# Patient Record
Sex: FEMALE | Race: BLACK OR AFRICAN AMERICAN | Employment: FULL TIME | URBAN - METROPOLITAN AREA
[De-identification: names, ages, dates, MRNs, and addresses within clinical notes are randomized per-mention and may not be internally consistent; named-entity substitution may affect disease eponyms.]

---

## 2017-02-01 ENCOUNTER — HOSPITAL ENCOUNTER (EMERGENCY)
Age: 27
Discharge: HOME | End: 2017-02-01
Attending: EMERGENCY MEDICINE | Admitting: EMERGENCY MEDICINE
Payer: SELF-PAY

## 2017-02-01 VITALS
RESPIRATION RATE: 16 BRPM | WEIGHT: 150 LBS | DIASTOLIC BLOOD PRESSURE: 68 MMHG | HEART RATE: 77 BPM | HEIGHT: 65 IN | BODY MASS INDEX: 24.99 KG/M2 | OXYGEN SATURATION: 99 % | TEMPERATURE: 98.3 F | SYSTOLIC BLOOD PRESSURE: 112 MMHG

## 2017-02-01 DIAGNOSIS — K03.81 CRACKED TOOTH: ICD-10-CM

## 2017-02-01 DIAGNOSIS — K04.7 DENTAL ABSCESS: Primary | ICD-10-CM

## 2017-02-01 PROCEDURE — 74011250637 HC RX REV CODE- 250/637: Performed by: EMERGENCY MEDICINE

## 2017-02-01 PROCEDURE — 99283 EMERGENCY DEPT VISIT LOW MDM: CPT

## 2017-02-01 RX ORDER — PENICILLIN V POTASSIUM 250 MG/1
500 TABLET, FILM COATED ORAL
Status: COMPLETED | OUTPATIENT
Start: 2017-02-01 | End: 2017-02-01

## 2017-02-01 RX ORDER — HYDROCODONE BITARTRATE AND ACETAMINOPHEN 5; 325 MG/1; MG/1
1 TABLET ORAL
Status: COMPLETED | OUTPATIENT
Start: 2017-02-01 | End: 2017-02-01

## 2017-02-01 RX ORDER — PENICILLIN V POTASSIUM 500 MG/1
500 TABLET, FILM COATED ORAL 4 TIMES DAILY
Qty: 28 TAB | Refills: 0 | Status: SHIPPED | OUTPATIENT
Start: 2017-02-01 | End: 2017-02-08

## 2017-02-01 RX ORDER — HYDROCODONE BITARTRATE AND ACETAMINOPHEN 5; 325 MG/1; MG/1
TABLET ORAL
Qty: 8 TAB | Refills: 0 | Status: SHIPPED | OUTPATIENT
Start: 2017-02-01 | End: 2017-05-01

## 2017-02-01 RX ADMIN — HYDROCODONE BITARTRATE AND ACETAMINOPHEN 1 TABLET: 5; 325 TABLET ORAL at 19:08

## 2017-02-01 RX ADMIN — PENICILLIN V POTASIUM 500 MG: 250 TABLET ORAL at 19:07

## 2017-02-01 NOTE — ED PROVIDER NOTES
HPI Comments: 33 y/o female presents with L lower dental pain. Started about 2-3 weeks ago, but worsened in past 3-4 days. Taking motrin without improvement. Has known cavities but does not have dental insurance. No difficulty swallowing. No fever. No other complaints. Patient is a 32 y.o. female presenting with dental problem. Dental Pain             Past Medical History:   Diagnosis Date    Miscarriage        Past Surgical History:   Procedure Laterality Date    Hx dilation and curettage  2013         No family history on file. Social History     Social History    Marital status: SINGLE     Spouse name: N/A    Number of children: N/A    Years of education: N/A     Occupational History    Not on file. Social History Main Topics    Smoking status: Never Smoker    Smokeless tobacco: Never Used    Alcohol use No    Drug use: No    Sexual activity: Yes     Partners: Male     Birth control/ protection: Condom     Other Topics Concern    Not on file     Social History Narrative    No narrative on file         ALLERGIES: Review of patient's allergies indicates no known allergies. Review of Systems   Constitutional: Negative for fever. HENT: Positive for dental problem. Negative for trouble swallowing. Gastrointestinal: Negative for vomiting. Vitals:    02/01/17 1844   BP: 112/68   Pulse: 77   Resp: 16   Temp: 98.3 °F (36.8 °C)   SpO2: 99%   Weight: 68 kg (150 lb)   Height: 5' 5\" (1.651 m)            Physical Exam   Constitutional: She is oriented to person, place, and time. She appears well-developed and well-nourished. HENT:   Head: Normocephalic and atraumatic. Mouth/Throat:       Uvula midline  No floor of mouth crepitus   Neck: Neck supple. No JVD present. Musculoskeletal: She exhibits no edema. Neurological: She is alert and oriented to person, place, and time. Skin: Skin is warm and dry. No erythema.         MDM  Number of Diagnoses or Management Options  Cracked tooth:   Dental abscess:   Diagnosis management comments: No signs of serious infection, maria ewigs. Pt tolerating secretions, afebrile. Will give dose of meds here, abx and pain meds. Given follow up with manoj moscoso.      ED Course       Procedures

## 2017-02-01 NOTE — DISCHARGE INSTRUCTIONS
Abscessed Tooth: Care Instructions  Your Care Instructions    An abscessed tooth is a tooth that has a pocket of pus in the tissues around it. Pus forms when the body tries to fight an infection caused by bacteria. If the pus cannot drain, it forms an abscess. An abscessed tooth can cause red, swollen gums and throbbing pain, especially when you chew. You may have a bad taste in your mouth and a fever, and your jaw may swell. Damage to the tooth, untreated tooth decay, or gum disease can cause an abscessed tooth. An abscessed tooth needs to be treated by a dental professional right away. If it is not treated, the infection could spread to other parts of your body. Your dentist will give you antibiotics to stop the infection. He or she may make a hole in the tooth or cut open (julia) the abscess inside your mouth so that the infection can drain, which should relieve your pain. You may need to have a root canal treatment, which tries to save your tooth by taking out the infected pulp and replacing it with a healing medicine and/or a filling. If these treatments do not work, your tooth may have to be removed. Follow-up care is a key part of your treatment and safety. Be sure to make and go to all appointments, and call your doctor if you are having problems. It's also a good idea to know your test results and keep a list of the medicines you take. How can you care for yourself at home? · Reduce pain and swelling in your face and jaw by putting ice or a cold pack on the outside of your cheek for 10 to 20 minutes at a time. Put a thin cloth between the ice and your skin. · Take pain medicines exactly as directed. ¨ If the doctor gave you a prescription medicine for pain, take it as prescribed. ¨ If you are not taking a prescription pain medicine, ask your doctor if you can take an over-the-counter medicine. · Take your antibiotics as directed. Do not stop taking them just because you feel better.  You need to take the full course of antibiotics. To prevent tooth abscess  · Brush and floss every day, and have regular dental checkups. · Eat a healthy diet, and avoid sugary foods and drinks. · Do not smoke or use spit tobacco. Tobacco use slows your ability to heal. It also increases your risk for gum disease and cancer of the mouth and throat. If you need help quitting, talk to your doctor about stop-smoking programs and medicines. These can increase your chances of quitting for good. When should you call for help? Call 911 anytime you think you may need emergency care. For example, call if:  · You have trouble breathing. Call your doctor now or seek immediate medical care if:  · You are dizzy or lightheaded, or you feel like you may faint. · You have a new or higher fever. · You have swelling, redness, or pain that spreads or gets worse. · You have pus coming from the tooth area. · You have an earache or pain behind your ear. · You have a fever with a stiff neck or a severe headache. · You are sensitive to light or feel very sleepy or confused. Watch closely for changes in your health, and be sure to contact your doctor if:  · You do not get better as expected. Where can you learn more? Go to http://marina-shana.info/. Enter B826 in the search box to learn more about \"Abscessed Tooth: Care Instructions. \"  Current as of: August 9, 2016  Content Version: 11.1  © 1112-5162 [employer] Care instructions adapted under license by [employer] (which disclaims liability or warranty for this information). If you have questions about a medical condition or this instruction, always ask your healthcare professional. [place] [address] any warranty or liability for your use of this information.

## 2017-02-02 NOTE — ED NOTES
I have reviewed discharge instructions with the patient. The patient verbalized understanding.   Patient armband removed and shredded, scripts x 2 given

## 2017-03-28 ENCOUNTER — APPOINTMENT (OUTPATIENT)
Dept: GENERAL RADIOLOGY | Age: 27
End: 2017-03-28
Attending: PHYSICIAN ASSISTANT
Payer: SELF-PAY

## 2017-03-28 ENCOUNTER — HOSPITAL ENCOUNTER (EMERGENCY)
Age: 27
Discharge: HOME | End: 2017-03-28
Attending: EMERGENCY MEDICINE
Payer: SELF-PAY

## 2017-03-28 VITALS
SYSTOLIC BLOOD PRESSURE: 103 MMHG | OXYGEN SATURATION: 98 % | WEIGHT: 155 LBS | BODY MASS INDEX: 25.83 KG/M2 | TEMPERATURE: 98.1 F | DIASTOLIC BLOOD PRESSURE: 60 MMHG | HEIGHT: 65 IN | RESPIRATION RATE: 15 BRPM | HEART RATE: 82 BPM

## 2017-03-28 DIAGNOSIS — J10.1 INFLUENZA A: Primary | ICD-10-CM

## 2017-03-28 LAB
FLUAV AG NPH QL IA: POSITIVE
FLUBV AG NOSE QL IA: NEGATIVE

## 2017-03-28 PROCEDURE — 87804 INFLUENZA ASSAY W/OPTIC: CPT | Performed by: PHYSICIAN ASSISTANT

## 2017-03-28 PROCEDURE — 99283 EMERGENCY DEPT VISIT LOW MDM: CPT

## 2017-03-28 PROCEDURE — 71020 XR CHEST PA LAT: CPT

## 2017-03-28 PROCEDURE — 74011250637 HC RX REV CODE- 250/637: Performed by: PHYSICIAN ASSISTANT

## 2017-03-28 RX ORDER — ACETAMINOPHEN 500 MG
1000 TABLET ORAL
Status: COMPLETED | OUTPATIENT
Start: 2017-03-28 | End: 2017-03-28

## 2017-03-28 RX ADMIN — ACETAMINOPHEN 1000 MG: 500 TABLET ORAL at 12:15

## 2017-03-28 NOTE — LETTER
[address] [place] [address] Dr [facility] EMERGENCY DEPT 
[address] [facility] [address] 
[phone] Work/School Note Date: 3/28/2017 To Whom It May concern: 
 
Desire Walton was seen and treated today in the emergency room by the following provider(s): 
Attending Provider: Ann Celestin DO Physician Assistant: LUÍS Sanderson. Desire Walton may be excused from work on 3/28/17. Sincerely, LUÍS Sanderson

## 2017-03-28 NOTE — ED PROVIDER NOTES
HPI Comments: 27yoF to ED c/o generalized body aches and cough since yesterday morning. Pt denies fever, chills, N/V/D, abd pain, dizziness, urinary complaints, or any other complaints. Pt states her significant other and son are both sick with same. Patient is a 32 y.o. female presenting with cough, general illness, and headaches. The history is provided by the patient. Cough   Associated symptoms include headaches. Generalized Body Aches   Associated symptoms include headaches. Headache   Associated symptoms include headaches. Past Medical History:   Diagnosis Date    Miscarriage        Past Surgical History:   Procedure Laterality Date    HX DILATION AND CURETTAGE  2013         No family history on file. Social History     Social History    Marital status: SINGLE     Spouse name: N/A    Number of children: N/A    Years of education: N/A     Occupational History    Not on file. Social History Main Topics    Smoking status: Never Smoker    Smokeless tobacco: Never Used    Alcohol use No    Drug use: No    Sexual activity: Yes     Partners: Male     Birth control/ protection: Condom     Other Topics Concern    Not on file     Social History Narrative    No narrative on file         ALLERGIES: Review of patient's allergies indicates no known allergies. Review of Systems   Respiratory: Positive for cough. Neurological: Positive for headaches. All other systems reviewed and are negative. Vitals:    03/28/17 1029   BP: 96/57   Pulse: 98   Resp: 14   Temp: 100.1 °F (37.8 °C)   SpO2: 98%   Weight: 70.3 kg (155 lb)   Height: 5' 5\" (1.651 m)            Physical Exam   Constitutional: She is oriented to person, place, and time. She appears well-developed and well-nourished. No distress. HENT:   Head: Normocephalic and atraumatic.    Right Ear: External ear normal.   Left Ear: External ear normal.   Nose: Nose normal.   Mouth/Throat: Oropharynx is clear and moist. No oropharyngeal exudate. Eyes: Conjunctivae and EOM are normal. Pupils are equal, round, and reactive to light. Neck: Normal range of motion. Neck supple. Cardiovascular: Normal rate, regular rhythm and normal heart sounds. Pulmonary/Chest: Effort normal and breath sounds normal. She has no wheezes. Abdominal: Soft. There is no tenderness. Musculoskeletal: Normal range of motion. She exhibits no edema. Neurological: She is alert and oriented to person, place, and time. Skin: Skin is warm and dry. No rash noted. She is not diaphoretic. Psychiatric: She has a normal mood and affect. MDM  Number of Diagnoses or Management Options  Influenza A:   Diagnosis management comments: Pt presents with flu-like symptoms, positive flu A, advised symptomatic treatment (fluids, tylenol, motrin, rest). Pt agree with plan.  LUÍS Briscoe 1:27 PM         Amount and/or Complexity of Data Reviewed  Clinical lab tests: ordered and reviewed    Risk of Complications, Morbidity, and/or Mortality  Presenting problems: moderate  Diagnostic procedures: low  Management options: low    Patient Progress  Patient progress: stable    ED Course       Procedures

## 2017-03-28 NOTE — DISCHARGE INSTRUCTIONS
Influenza (Flu): Care Instructions  Your Care Instructions  Influenza (flu) is an infection in the lungs and breathing passages. It is caused by the influenza virus. There are different strains, or types, of the flu virus from year to year. Unlike the common cold, the flu comes on suddenly and the symptoms, such as a cough, congestion, fever, chills, fatigue, aches, and pains, are more severe. These symptoms may last up to 10 days. Although the flu can make you feel very sick, it usually doesn't cause serious health problems. Home treatment is usually all you need for flu symptoms. But your doctor may prescribe antiviral medicine to prevent other health problems, such as pneumonia, from developing. Older people and those who have a long-term health condition, such as lung disease, are most at risk for having pneumonia or other health problems. Follow-up care is a key part of your treatment and safety. Be sure to make and go to all appointments, and call your doctor if you are having problems. Its also a good idea to know your test results and keep a list of the medicines you take. How can you care for yourself at home? · Get plenty of rest.  · Drink plenty of fluids, enough so that your urine is light yellow or clear like water. If you have kidney, heart, or liver disease and have to limit fluids, talk with your doctor before you increase the amount of fluids you drink. · Take an over-the-counter pain medicine if needed, such as acetaminophen (Tylenol), ibuprofen (Advil, Motrin), or naproxen (Aleve), to relieve fever, headache, and muscle aches. Read and follow all instructions on the label. No one younger than 20 should take aspirin. It has been linked to Reye syndrome, a serious illness. · Do not smoke. Smoking can make the flu worse. If you need help quitting, talk to your doctor about stop-smoking programs and medicines. These can increase your chances of quitting for good.   · Breathe moist air from a hot shower or from a sink filled with hot water to help clear a stuffy nose. · Before you use cough and cold medicines, check the label. These medicines may not be safe for young children or for people with certain health problems. · If the skin around your nose and lips becomes sore, put some petroleum jelly on the area. · To ease coughing:  ¨ Drink fluids to soothe a scratchy throat. ¨ Suck on cough drops or plain hard candy. ¨ Take an over-the-counter cough medicine that contains dextromethorphan to help you get some sleep. Read and follow all instructions on the label. ¨ Raise your head at night with an extra pillow. This may help you rest if coughing keeps you awake. · Take any prescribed medicine exactly as directed. Call your doctor if you think you are having a problem with your medicine. To avoid spreading the flu  · Wash your hands regularly, and keep your hands away from your face. · Stay home from school, work, and other public places until you are feeling better and your fever has been gone for at least 24 hours. The fever needs to have gone away on its own without the help of medicine. · Ask people living with you to talk to their doctors about preventing the flu. They may get antiviral medicine to keep from getting the flu from you. · To prevent the flu in the future, get a flu vaccine every fall. Encourage people living with you to get the vaccine. · Cover your mouth when you cough or sneeze. When should you call for help? Call 911 anytime you think you may need emergency care. For example, call if:  · You have severe trouble breathing. Call your doctor now or seek immediate medical care if:  · You have new or worse trouble breathing. · You seem to be getting much sicker. · You feel very sleepy or confused. · You have a new or higher fever. · You get a new rash.   Watch closely for changes in your health, and be sure to contact your doctor if:  · You begin to get better and then get worse. · You are not getting better after 1 week. Where can you learn more? Go to http://marina-shana.info/. Enter U430 in the search box to learn more about \"Influenza (Flu): Care Instructions. \"  Current as of: May 23, 2016  Content Version: 11.1  © 6445-9096 [employer]. Care instructions adapted under license by [employer] (which disclaims liability or warranty for this information). If you have questions about a medical condition or this instruction, always ask your healthcare professional. [address] any warranty or liability for your use of this information.

## 2017-05-01 ENCOUNTER — HOSPITAL ENCOUNTER (EMERGENCY)
Age: 27
Discharge: HOME | End: 2017-05-01
Attending: EMERGENCY MEDICINE | Admitting: EMERGENCY MEDICINE
Payer: SELF-PAY

## 2017-05-01 VITALS
RESPIRATION RATE: 20 BRPM | HEIGHT: 65 IN | HEART RATE: 83 BPM | TEMPERATURE: 99.2 F | DIASTOLIC BLOOD PRESSURE: 79 MMHG | WEIGHT: 160 LBS | SYSTOLIC BLOOD PRESSURE: 121 MMHG | OXYGEN SATURATION: 99 % | BODY MASS INDEX: 26.66 KG/M2

## 2017-05-01 DIAGNOSIS — K02.9 PAIN DUE TO DENTAL CARIES: Primary | ICD-10-CM

## 2017-05-01 PROCEDURE — 74011250637 HC RX REV CODE- 250/637: Performed by: EMERGENCY MEDICINE

## 2017-05-01 PROCEDURE — 99283 EMERGENCY DEPT VISIT LOW MDM: CPT

## 2017-05-01 RX ORDER — HYDROCODONE BITARTRATE AND ACETAMINOPHEN 5; 325 MG/1; MG/1
TABLET ORAL
Qty: 16 TAB | Refills: 0 | Status: SHIPPED | OUTPATIENT
Start: 2017-05-01 | End: 2017-10-02

## 2017-05-01 RX ORDER — IBUPROFEN 800 MG/1
800 TABLET ORAL EVERY 8 HOURS
Qty: 15 TAB | Refills: 0 | Status: SHIPPED | OUTPATIENT
Start: 2017-05-01 | End: 2017-05-06

## 2017-05-01 RX ORDER — OXYCODONE AND ACETAMINOPHEN 5; 325 MG/1; MG/1
1 TABLET ORAL
Status: COMPLETED | OUTPATIENT
Start: 2017-05-01 | End: 2017-05-01

## 2017-05-01 RX ORDER — PENICILLIN V POTASSIUM 500 MG/1
500 TABLET, FILM COATED ORAL 4 TIMES DAILY
Qty: 40 TAB | Refills: 0 | Status: SHIPPED | OUTPATIENT
Start: 2017-05-01 | End: 2017-05-11

## 2017-05-01 RX ORDER — IBUPROFEN 400 MG/1
800 TABLET ORAL
Status: COMPLETED | OUTPATIENT
Start: 2017-05-01 | End: 2017-05-01

## 2017-05-01 RX ADMIN — OXYCODONE HYDROCHLORIDE AND ACETAMINOPHEN 1 TABLET: 5; 325 TABLET ORAL at 07:18

## 2017-05-01 RX ADMIN — IBUPROFEN 800 MG: 400 TABLET, FILM COATED ORAL at 07:18

## 2017-05-01 NOTE — ED PROVIDER NOTES
Patient is a 32 y.o. female presenting with dental problem. The history is provided by the patient. Dental Pain         pt c/o lower left dental pain for several days. Needs dental f/up. Denies fever, drooling, trismus. No meds taken pta for relief. Denies ETOH, tobacco, illicits. LNMP 2 weeks ago. Past Medical History:   Diagnosis Date    Miscarriage        Past Surgical History:   Procedure Laterality Date    HX DILATION AND CURETTAGE  2013         History reviewed. No pertinent family history. Social History     Social History    Marital status: SINGLE     Spouse name: N/A    Number of children: N/A    Years of education: N/A     Occupational History    Not on file. Social History Main Topics    Smoking status: Never Smoker    Smokeless tobacco: Never Used    Alcohol use No    Drug use: No    Sexual activity: Yes     Partners: Male     Birth control/ protection: Condom     Other Topics Concern    Not on file     Social History Narrative         ALLERGIES: Review of patient's allergies indicates no known allergies. Review of Systems   Constitutional: Negative for fever. HENT: Positive for dental problem. Negative for drooling and trouble swallowing. All other systems reviewed and are negative. Vitals:    05/01/17 0626 05/01/17 0658   BP: 121/79    Pulse: 83    Resp: 20    Temp: 99.2 °F (37.3 °C)    SpO2: 99% 99%   Weight: 72.6 kg (160 lb)    Height: 5' 5\" (1.651 m)             Physical Exam   Constitutional: She is oriented to person, place, and time. She appears well-developed. HENT:   Head: Normocephalic and atraumatic. Dental: lower left dental caries for first and second molar, tender, eroded. No adjacent gingival fluctuance or erythema. No drooling, trismus. No submandibular lymphadenopathy. Eyes: Pupils are equal, round, and reactive to light. Neck: No JVD present. No tracheal deviation present. No thyromegaly present.    Cardiovascular: Normal rate, regular rhythm and normal heart sounds. Exam reveals no gallop and no friction rub. No murmur heard. Pulmonary/Chest: Effort normal and breath sounds normal. No stridor. No respiratory distress. She has no wheezes. She has no rales. She exhibits no tenderness. Abdominal: Soft. She exhibits no distension and no mass. There is no tenderness. There is no rebound and no guarding. Musculoskeletal: She exhibits no edema or tenderness. Lymphadenopathy:     She has no cervical adenopathy. Neurological: She is alert and oriented to person, place, and time. Skin: Skin is warm and dry. No rash noted. No erythema. No pallor. Psychiatric: She has a normal mood and affect. Her behavior is normal. Thought content normal.   Nursing note and vitals reviewed. MDM  Number of Diagnoses or Management Options  Pain due to dental caries:   Diagnosis management comments: Differential: dental caries; gingivitis; chronic pain; dental abscess    F/up with dental.  Rx for ABX, pain meds. ED Course       Procedures      7:02 AM  Diagnosis:   1. Pain due to dental caries          Disposition: home    Follow-up Information     Follow up With Details Comments [facility] [address] Schedule an appointment as soon as possible for a visit today  [address] [place] [address]  [phone]    [facility] EMERGENCY DEPT  If symptoms worsen return immediately [address] [place] [address]  [phone]          Patient's Medications   Start Taking    HYDROCODONE-ACETAMINOPHEN (NORCO) 5-325 MG PER TABLET    Take 1-2 tablets PO every 4-6 hours as needed for pain control. If over the counter ibuprofen or acetaminophen was suggested, then only take the vicodin for pain not well controlled with the over the counter medication. IBUPROFEN (MOTRIN) 800 MG TABLET    Take 1 Tab by mouth every eight (8) hours for 5 days.     PENICILLIN V POTASSIUM (VEETID) 500 MG TABLET    Take 1 Tab by mouth four (4) times daily for 10 days. Continue Taking    LEVOFLOXACIN (LEVAQUIN) 500 MG TABLET    Take 1 Tab by mouth daily. METRONIDAZOLE (FLAGYL) 500 MG TABLET    Take 1 Tab by mouth three (3) times daily. ONDANSETRON HCL (ZOFRAN, AS HYDROCHLORIDE,) 4 MG TABLET    Take 1 Tab by mouth every eight (8) hours as needed for Nausea. These Medications have changed    No medications on file   Stop Taking    HYDROCODONE-ACETAMINOPHEN (NORCO) 5-325 MG PER TABLET    Take 1-2 tablets PO every 4-6 hours as needed for pain control. If over the counter ibuprofen or acetaminophen was suggested, then only take the vicodin for pain not well controlled with the over the counter medication.

## 2017-05-01 NOTE — ED TRIAGE NOTES
Patient states that she has been having a toothache to the left back 3 teeth. States that she thinks that the nerve is exposed.

## 2017-05-01 NOTE — DISCHARGE INSTRUCTIONS
Tooth Decay: Care Instructions  Your Care Instructions    Tooth decay is damage to a tooth caused by plaque. Plaque is a thin film of bacteria that sticks to the teeth above and below the gum line. If plaque isn't removed from the teeth, it can build up and harden into tartar. The bacteria in plaque and tartar use sugars in food to make acids. These acids can cause tooth decay and gum disease. Any part of your tooth can decay, from the roots below the gum line to the chewing surface. Decay can affect the outer layer (enamel) or inner layer (dentin) of your teeth. The deeper the decay, the worse the damage. Untreated tooth decay will get worse and may lead to tooth loss. If you have a small hole (cavity) in your tooth, your dentist can repair it by removing the decay and filling the hole. If you have deeper decay, you may need more treatment. A very badly damaged tooth may have to be removed. Follow-up care is a key part of your treatment and safety. Be sure to make and go to all appointments, and call your dentist if you are having problems. It's also a good idea to know your test results and keep a list of the medicines you take. How can you care for yourself at home? If you have pain:  · Take an over-the-counter pain medicine, such as acetaminophen (Tylenol), ibuprofen (Advil, Motrin), or naproxen (Aleve). Be safe with medicines. Read and follow all instructions on the label. ¨ Do not take two or more pain medicines at the same time unless the doctor told you to. Many pain medicines have acetaminophen, which is Tylenol. Too much acetaminophen (Tylenol) can be harmful. · Put ice or a cold pack on your cheek over the tooth for 10 to 15 minutes at a time. Put a thin cloth between the ice and your skin. To prevent tooth decay  · Brush teeth twice a day, and floss once a day. Brushing with fluoride toothpaste and flossing may be enough to reverse early decay.   · Use a toothbrush with soft, rounded-end bristles and a head that is small enough to reach all parts of your teeth and mouth. Replace your toothbrush every 3 or 4 months. You may also use an electric toothbrush that has rotating and oscillating (back-and-forth) action. · Ask your dentist about having fluoride treatments at the dental office. · Brush your tongue to help get rid of bacteria. · Eat healthy foods that include whole grains, vegetables, and fruits. · Have your teeth cleaned by a professional at least two times a year. · Do not smoke or use smokeless tobacco. Tobacco can make tooth decay worse. When should you call for help? Call your dentist now or seek immediate medical care if:  · You have signs of infection, such as:  ¨ Increased pain, swelling, warmth, or redness. ¨ Red streaks on the gum leading from a tooth. ¨ Pus draining from the gum around a tooth. ¨ A fever. · You have a toothache. Watch closely for changes in your health, and be sure to contact your dentist if you have any problems. Where can you learn more? Go to http://marina-shana.info/. Enter H483 in the search box to learn more about \"Tooth Decay: Care Instructions. \"  Current as of: August 9, 2016  Content Version: 11.2  © 4547-2828 [employer]. Care instructions adapted under license by [employer] (which disclaims liability or warranty for this information). If you have questions about a medical condition or this instruction, always ask your healthcare professional. [place] [address] any warranty or liability for your use of this information.

## 2017-05-01 NOTE — LETTER
NOTIFICATION RETURN TO WORK / SCHOOL 
 
5/1/2017 7:17 AM 
 
Ms. Genesis Guzman [address] [place] [address] [facility] [address] To Whom It May Concern: 
 
Genesis Guzman is currently under the care of [facility] EMERGENCY DEPT. Spouse with patient in ED this morning for evaluation and treatment. If there are questions or concerns please have the patient contact our office.  
 
 
 
Sincerely, 
 
 
 
 
Lisa Street PA-C

## 2017-10-02 ENCOUNTER — HOSPITAL ENCOUNTER (EMERGENCY)
Age: 27
Discharge: HOME | End: 2017-10-02
Attending: EMERGENCY MEDICINE
Payer: SELF-PAY

## 2017-10-02 ENCOUNTER — APPOINTMENT (OUTPATIENT)
Dept: ULTRASOUND IMAGING | Age: 27
End: 2017-10-02
Attending: PHYSICIAN ASSISTANT
Payer: SELF-PAY

## 2017-10-02 VITALS
RESPIRATION RATE: 20 BRPM | WEIGHT: 160 LBS | BODY MASS INDEX: 26.66 KG/M2 | HEIGHT: 65 IN | HEART RATE: 106 BPM | OXYGEN SATURATION: 99 % | SYSTOLIC BLOOD PRESSURE: 126 MMHG | DIASTOLIC BLOOD PRESSURE: 75 MMHG

## 2017-10-02 DIAGNOSIS — Z3A.01 5 WEEKS GESTATION OF PREGNANCY: Primary | ICD-10-CM

## 2017-10-02 DIAGNOSIS — N30.00 ACUTE CYSTITIS WITHOUT HEMATURIA: ICD-10-CM

## 2017-10-02 DIAGNOSIS — N76.0 BV (BACTERIAL VAGINOSIS): ICD-10-CM

## 2017-10-02 DIAGNOSIS — O21.9 NAUSEA AND VOMITING DURING PREGNANCY: ICD-10-CM

## 2017-10-02 DIAGNOSIS — B96.89 BV (BACTERIAL VAGINOSIS): ICD-10-CM

## 2017-10-02 LAB
ALBUMIN SERPL-MCNC: 4 G/DL (ref 3.4–5)
ALBUMIN/GLOB SERPL: 0.9 {RATIO} (ref 0.8–1.7)
ALP SERPL-CCNC: 47 U/L (ref 45–117)
ALT SERPL-CCNC: 15 U/L (ref 13–56)
ANION GAP SERPL CALC-SCNC: 8 MMOL/L (ref 3–18)
APPEARANCE UR: CLEAR
AST SERPL-CCNC: 9 U/L (ref 15–37)
BACTERIA URNS QL MICRO: ABNORMAL /HPF
BASOPHILS # BLD: 0 K/UL (ref 0–0.06)
BASOPHILS NFR BLD: 0 % (ref 0–2)
BILIRUB SERPL-MCNC: 1.1 MG/DL (ref 0.2–1)
BILIRUB UR QL: NEGATIVE
BUN SERPL-MCNC: 14 MG/DL (ref 7–18)
BUN/CREAT SERPL: 18 (ref 12–20)
CALCIUM SERPL-MCNC: 9.4 MG/DL (ref 8.5–10.1)
CHLORIDE SERPL-SCNC: 103 MMOL/L (ref 100–108)
CO2 SERPL-SCNC: 26 MMOL/L (ref 21–32)
COLOR UR: YELLOW
CREAT SERPL-MCNC: 0.76 MG/DL (ref 0.6–1.3)
DIFFERENTIAL METHOD BLD: ABNORMAL
EOSINOPHIL # BLD: 0 K/UL (ref 0–0.4)
EOSINOPHIL NFR BLD: 1 % (ref 0–5)
EPITH CASTS URNS QL MICRO: ABNORMAL /LPF (ref 0–5)
ERYTHROCYTE [DISTWIDTH] IN BLOOD BY AUTOMATED COUNT: 13.1 % (ref 11.6–14.5)
GLOBULIN SER CALC-MCNC: 4.3 G/DL (ref 2–4)
GLUCOSE SERPL-MCNC: 89 MG/DL (ref 74–99)
GLUCOSE UR STRIP.AUTO-MCNC: NEGATIVE MG/DL
HCG SERPL-ACNC: ABNORMAL MIU/ML (ref 0–10)
HCT VFR BLD AUTO: 42.3 % (ref 35–45)
HGB BLD-MCNC: 14.8 G/DL (ref 12–16)
HGB UR QL STRIP: NEGATIVE
KETONES UR QL STRIP.AUTO: >160 MG/DL
LEUKOCYTE ESTERASE UR QL STRIP.AUTO: ABNORMAL
LIPASE SERPL-CCNC: 104 U/L (ref 73–393)
LYMPHOCYTES # BLD: 0.7 K/UL (ref 0.9–3.6)
LYMPHOCYTES NFR BLD: 16 % (ref 21–52)
MCH RBC QN AUTO: 29.8 PG (ref 24–34)
MCHC RBC AUTO-ENTMCNC: 35 G/DL (ref 31–37)
MCV RBC AUTO: 85.3 FL (ref 74–97)
MONOCYTES # BLD: 0.3 K/UL (ref 0.05–1.2)
MONOCYTES NFR BLD: 6 % (ref 3–10)
MUCOUS THREADS URNS QL MICRO: ABNORMAL /LPF
NEUTS SEG # BLD: 3.3 K/UL (ref 1.8–8)
NEUTS SEG NFR BLD: 77 % (ref 40–73)
NITRITE UR QL STRIP.AUTO: POSITIVE
PH UR STRIP: 6 [PH] (ref 5–8)
PLATELET # BLD AUTO: 211 K/UL (ref 135–420)
PMV BLD AUTO: 10 FL (ref 9.2–11.8)
POTASSIUM SERPL-SCNC: 3.8 MMOL/L (ref 3.5–5.5)
PROT SERPL-MCNC: 8.3 G/DL (ref 6.4–8.2)
PROT UR STRIP-MCNC: 30 MG/DL
RBC # BLD AUTO: 4.96 M/UL (ref 4.2–5.3)
RBC #/AREA URNS HPF: NEGATIVE /HPF (ref 0–5)
SERVICE CMNT-IMP: NORMAL
SODIUM SERPL-SCNC: 137 MMOL/L (ref 136–145)
SP GR UR REFRACTOMETRY: >1.03 (ref 1–1.03)
UROBILINOGEN UR QL STRIP.AUTO: 1 EU/DL (ref 0.2–1)
WBC # BLD AUTO: 4.3 K/UL (ref 4.6–13.2)
WBC URNS QL MICRO: ABNORMAL /HPF (ref 0–4)
WET PREP GENITAL: NORMAL

## 2017-10-02 PROCEDURE — 99284 EMERGENCY DEPT VISIT MOD MDM: CPT

## 2017-10-02 PROCEDURE — 74011250636 HC RX REV CODE- 250/636: Performed by: PHYSICIAN ASSISTANT

## 2017-10-02 PROCEDURE — 81001 URINALYSIS AUTO W/SCOPE: CPT

## 2017-10-02 PROCEDURE — 87491 CHLMYD TRACH DNA AMP PROBE: CPT

## 2017-10-02 PROCEDURE — 96361 HYDRATE IV INFUSION ADD-ON: CPT

## 2017-10-02 PROCEDURE — 87186 SC STD MICRODIL/AGAR DIL: CPT

## 2017-10-02 PROCEDURE — 84702 CHORIONIC GONADOTROPIN TEST: CPT

## 2017-10-02 PROCEDURE — 87086 URINE CULTURE/COLONY COUNT: CPT

## 2017-10-02 PROCEDURE — 83690 ASSAY OF LIPASE: CPT

## 2017-10-02 PROCEDURE — 85025 COMPLETE CBC W/AUTO DIFF WBC: CPT

## 2017-10-02 PROCEDURE — 87077 CULTURE AEROBIC IDENTIFY: CPT

## 2017-10-02 PROCEDURE — 87210 SMEAR WET MOUNT SALINE/INK: CPT

## 2017-10-02 PROCEDURE — 96374 THER/PROPH/DIAG INJ IV PUSH: CPT

## 2017-10-02 PROCEDURE — 96376 TX/PRO/DX INJ SAME DRUG ADON: CPT

## 2017-10-02 PROCEDURE — 76817 TRANSVAGINAL US OBSTETRIC: CPT

## 2017-10-02 PROCEDURE — 80053 COMPREHEN METABOLIC PANEL: CPT

## 2017-10-02 RX ORDER — ONDANSETRON 2 MG/ML
4 INJECTION INTRAMUSCULAR; INTRAVENOUS
Status: COMPLETED | OUTPATIENT
Start: 2017-10-02 | End: 2017-10-02

## 2017-10-02 RX ORDER — CLINDAMYCIN HYDROCHLORIDE 300 MG/1
300 CAPSULE ORAL 4 TIMES DAILY
Qty: 28 CAP | Refills: 0 | Status: SHIPPED | OUTPATIENT
Start: 2017-10-02 | End: 2017-10-09

## 2017-10-02 RX ORDER — NITROFURANTOIN 25; 75 MG/1; MG/1
100 CAPSULE ORAL 2 TIMES DAILY
Qty: 6 CAP | Refills: 0 | Status: SHIPPED | OUTPATIENT
Start: 2017-10-02 | End: 2017-10-05

## 2017-10-02 RX ORDER — ONDANSETRON 4 MG/1
4 TABLET, ORALLY DISINTEGRATING ORAL
Qty: 15 TAB | Refills: 0 | Status: SHIPPED | OUTPATIENT
Start: 2017-10-02 | End: 2020-05-06

## 2017-10-02 RX ADMIN — ONDANSETRON 4 MG: 2 INJECTION INTRAMUSCULAR; INTRAVENOUS at 11:04

## 2017-10-02 RX ADMIN — SODIUM CHLORIDE 1000 ML: 900 INJECTION, SOLUTION INTRAVENOUS at 11:04

## 2017-10-02 RX ADMIN — ONDANSETRON 4 MG: 2 INJECTION INTRAMUSCULAR; INTRAVENOUS at 12:15

## 2017-10-02 RX ADMIN — SODIUM CHLORIDE 1000 ML: 900 INJECTION, SOLUTION INTRAVENOUS at 12:16

## 2017-10-02 NOTE — Clinical Note
PLEASE FOLLOW-UP AS DIRECTED WITHOUT FAIL WITHIN THE TIME FRAME RECOMMENDED AS FAILURE TO DO SO COULD RESULT IN WORSENING OF YOUR PHYSICAL CONDITION, DEATH, AND OR PERMANENT DISABILITY.  
  
RETURN TO THE EMERGENCY DEPARTMENT AT IF YOU ARE UNABLE TO FOLL OW-UP AS DIRECTED.  
  
RETURN TO THE EMERGENCY DEPARTMENT AT ONCE IF YOU HAVE SYMPTOMS THAT DO NOT IMPROVE WITH TREATMENT, NEW SYMPTOMS, WORSENING SYMPTOMS, OR ANY OTHER CONCERNS.

## 2017-10-02 NOTE — LETTER
[address] [place] [address] Dr [facility] EMERGENCY DEPT 
[address] [facility] [address] 
[phone] Work/School Note Date: 10/2/2017 To Whom It May concern: 
 
Margreta Boas was seen and treated today in the emergency room by the following provider(s): 
Attending Provider: Leonid Plascencia MD 
Physician Assistant: Kortney Noble PA-C. Margreta Boas may return to work on 10/04/2017. Sincerely, Kortney Noble PA-C

## 2017-10-02 NOTE — ED PROVIDER NOTES
HPI Comments: Pt is a 31 yo  female 5-6 weeks preg by LMP presents to the ED for epigastric abd pain, N/V for 3-4 days, gradually worsening. She has had +home preg test but has not yet seen OBGYN. She also notes mild vaginal discharge, but denies pelvic pain, vaginal bleeding, or vaginal pain. She has hx of one miscarriage. She denies fever, chills, HA, dizziness, lightheadedness, CP, SOB, cough, diarrhea, constipation, dysuria, back pain, frequency, or any other complaints at this time. Patient is a 32 y.o. female presenting with pregnancy problem and abdominal pain. The history is provided by the patient. Pregnancy Problem   The problem has been gradually worsening. Associated symptoms include abdominal pain. Pertinent negatives include no chest pain, no headaches and no shortness of breath. Nothing aggravates the symptoms. Nothing relieves the symptoms. She has tried nothing for the symptoms. Abdominal Pain    The pain is associated with vomiting. The pain is located in the epigastric region. The quality of the pain is aching. The pain is moderate. Associated symptoms include nausea and vomiting. Pertinent negatives include no anorexia, no fever, no belching, no diarrhea, no flatus, no hematochezia, no melena, no constipation, no dysuria, no headaches, no arthralgias, no myalgias, no trauma, no chest pain and no back pain. Nothing worsens the pain. The pain is relieved by nothing. Past workup includes no ultrasound. The patient's surgical history non-contributory. Past Medical History:   Diagnosis Date    Miscarriage        Past Surgical History:   Procedure Laterality Date    HX DILATION AND CURETTAGE           No family history on file. Social History     Social History    Marital status: SINGLE     Spouse name: N/A    Number of children: N/A    Years of education: N/A     Occupational History    Not on file.      Social History Main Topics    Smoking status: Never Smoker    Smokeless tobacco: Never Used    Alcohol use No    Drug use: No    Sexual activity: Yes     Partners: Male     Birth control/ protection: Condom     Other Topics Concern    Not on file     Social History Narrative         ALLERGIES: Review of patient's allergies indicates no known allergies. Review of Systems   Constitutional: Negative for chills and fever. HENT: Negative for ear pain, rhinorrhea and sore throat. Eyes: Negative for pain and redness. Respiratory: Negative for cough and shortness of breath. Cardiovascular: Negative for chest pain. Gastrointestinal: Positive for abdominal pain, nausea and vomiting. Negative for anorexia, constipation, diarrhea, flatus, hematochezia and melena. Genitourinary: Positive for vaginal discharge. Negative for dysuria, pelvic pain, vaginal bleeding and vaginal pain. Musculoskeletal: Negative for arthralgias, back pain and myalgias. Skin: Negative. Neurological: Negative for dizziness, syncope, weakness, light-headedness, numbness and headaches. Psychiatric/Behavioral: Negative. Vitals:    10/02/17 1007   BP: 126/75   Pulse: (!) 106   Resp: 20   SpO2: 99%   Weight: 72.6 kg (160 lb)   Height: 5' 5\" (1.651 m)            Physical Exam   Constitutional: She is oriented to person, place, and time. She appears well-developed and well-nourished. Vomiting during exam   HENT:   Head: Normocephalic and atraumatic. Right Ear: Tympanic membrane, external ear and ear canal normal.   Left Ear: Tympanic membrane, external ear and ear canal normal.   Nose: Nose normal.   Mouth/Throat: Oropharynx is clear and moist and mucous membranes are normal. No oropharyngeal exudate. Eyes: Conjunctivae and EOM are normal. Pupils are equal, round, and reactive to light. Neck: Normal range of motion. Neck supple. Cardiovascular: Normal rate, regular rhythm, normal heart sounds and intact distal pulses. Exam reveals no gallop and no friction rub.     No murmur heard.  Pulmonary/Chest: Effort normal and breath sounds normal. No respiratory distress. She has no wheezes. She has no rales. Abdominal: Soft. Normal appearance and bowel sounds are normal. She exhibits no distension. There is tenderness in the epigastric area. There is no rigidity, no rebound, no guarding, no CVA tenderness, no tenderness at McBurney's point and negative Wilson's sign. Musculoskeletal: Normal range of motion. Neurological: She is alert and oriented to person, place, and time. Skin: Skin is warm and dry. She is not diaphoretic. Psychiatric: She has a normal mood and affect. Her behavior is normal. Judgment and thought content normal.   Nursing note and vitals reviewed. MDM  Number of Diagnoses or Management Options  5 weeks gestation of pregnancy: new and requires workup  Acute cystitis without hematuria: new and requires workup  BV (bacterial vaginosis): new and requires workup  Nausea and vomiting during pregnancy: new and requires workup  Diagnosis management comments: DDx: gastroenteritis, GERD, hernia, hepatitis, pancreatitis, gallbladder etiology, constipation, adhesions, UTI, pyelo, kidney stones, STD,  Ikii-Ldzd-Nisxyh syndrome, preg, ectopic, ovarian cyst, ovarian torsion, tubo-ovarian abscess, appendicitis, diverticulitis, SBO, GI bleed, mesenteric ischemia, AAA, cardiac etiology, musculoskeletal pain/spasm, malignancy    US IMPRESSION:     1. Small intrauterine twin gestational sac with yolk sac seen but no fetal pole  or fetal cardiac activity detected yet as above. Recommend follow-up ultrasound  within the week for further evaluation. 2. Mildly enlarged left ovary containing a likely 2 x 3 cm corpus luteum cyst.  Nonvisualization of right ovary. Small pelvic fluid. UA with UTI, will tx with macrobid, send culture. Wet prep with BV, in 1st trimester, will tx with clinda. Pt reassessed feeling improved, will d/c to home with zofran.  Pt to follow up with OBGYN in 2 days for re-check. ED return precautions given. Pt results have been reviewed with them. They have been counseled regarding diagnosis, treatment, and plan. Pt verbally conveys understanding and agreement of the signs, symptoms, diagnosis, treatment and prognosis and additionally agrees to follow up as discussed. Pt also agrees with the care-plan and conveys that all of their questions have been answered. I have also provided discharge instructions for them that include: educational information regarding their diagnosis and treatment, and list of reasons why they would want to return to the ED prior to their follow-up appointment, should their condition change. Carla Ybarra PA-C 2:52 PM        Amount and/or Complexity of Data Reviewed  Clinical lab tests: ordered and reviewed  Review and summarize past medical records: yes  Discuss the patient with other providers: yes    Risk of Complications, Morbidity, and/or Mortality  Presenting problems: moderate  Diagnostic procedures: moderate  Management options: moderate    Patient Progress  Patient progress: stable    ED Course       Pelvic Exam  Date/Time: 10/2/2017 12:10 PM  Performed by: PA  Procedure duration:  5 minutes. Exam assisted by:  Lyudmila Caraballo. Type of exam performed: bimanual and speculum. External genitalia appearance: normal.    Vaginal exam:  discharge. The amount of discharge was:  mild. The discharge was white and milky. Cervical exam:  normal, no cervical motion tenderness and os closed (No bleeding or tissue from os). Specimen(s) collected:  GC and vaginal culture.   Bimanual exam:  normal.    Patient tolerance: Patient tolerated the procedure well with no immediate complications          Labs Reviewed   URINALYSIS W/ RFLX MICROSCOPIC - Abnormal; Notable for the following:        Result Value    Specific gravity >1.030 (*)     Protein 30 (*)     Ketone >160 (*)     Nitrites POSITIVE (*)     Leukocyte Esterase TRACE (*)     All other components within normal limits   CBC WITH AUTOMATED DIFF - Abnormal; Notable for the following:     WBC 4.3 (*)     NEUTROPHILS 77 (*)     LYMPHOCYTES 16 (*)     ABS. LYMPHOCYTES 0.7 (*)     All other components within normal limits   BETA HCG, QT - Abnormal; Notable for the following:     [place] [address] (*)     All other components within normal limits   METABOLIC PANEL, COMPREHENSIVE - Abnormal; Notable for the following:     Bilirubin, total 1.1 (*)     AST (SGOT) 9 (*)     Protein, total 8.3 (*)     Globulin 4.3 (*)     All other components within normal limits   URINE MICROSCOPIC ONLY - Abnormal; Notable for the following:     Bacteria 1+ (*)     Mucus 3+ (*)     All other components within normal limits   WET PREP   LIPASE   CHLAMYDIA/NEISSERIA AMPLIFICATION     Diagnosis:   1. 5 weeks gestation of pregnancy    2. Acute cystitis without hematuria    3. Nausea and vomiting during pregnancy    4. BV (bacterial vaginosis)          Disposition: Discharge to home. Follow-up Information     Follow up With Details Comments Contact Info    [facility]  As needed, If symptoms worsen [address] [place] [address] [address] [facility]    Ander Glasgow MD Go in 2 days  [address] [place] [address]            Patient's Medications   Start Taking    CLINDAMYCIN (CLEOCIN) 300 MG CAPSULE    Take 1 Cap by mouth four (4) times daily for 7 days. NITROFURANTOIN, MACROCRYSTAL-MONOHYDRATE, (MACROBID) 100 MG CAPSULE    Take 1 Cap by mouth two (2) times a day for 3 days. ONDANSETRON (ZOFRAN ODT) 4 MG DISINTEGRATING TABLET    Take 1 Tab by mouth every eight (8) hours as needed for Nausea. Continue Taking    No medications on file   These Medications have changed    No medications on file   Stop Taking    HYDROCODONE-ACETAMINOPHEN (NORCO) 5-325 MG PER TABLET    Take 1-2 tablets PO every 4-6 hours as needed for pain control.   If over the counter ibuprofen or acetaminophen was suggested, then only take the vicodin for pain not well controlled with the over the counter medication. LEVOFLOXACIN (LEVAQUIN) 500 MG TABLET    Take 1 Tab by mouth daily. METRONIDAZOLE (FLAGYL) 500 MG TABLET    Take 1 Tab by mouth three (3) times daily. ONDANSETRON HCL (ZOFRAN, AS HYDROCHLORIDE,) 4 MG TABLET    Take 1 Tab by mouth every eight (8) hours as needed for Nausea.

## 2017-10-02 NOTE — ED TRIAGE NOTES
Patient states that she is 5-6 weeks pregnant having N/V unable to hold anything down has abdominal pain as well.

## 2017-10-02 NOTE — ED NOTES
The L.C. Met with the client in the ER FT 5 to discuss their follow up care. The client will make an attempt to go to the [facility] on Tuesday, October 3, 2017. The client will attend the clinic on Tuesday or Thursday until their benefits become active.

## 2017-10-02 NOTE — PROGRESS NOTES
Patient provided with indigent meds as well as information on the [facility] OB clinic and [place] with [facility] Department.

## 2017-10-02 NOTE — ED NOTES
Pelvic exam completed by provider and assisted by [facility].  Patient tolerated well

## 2017-10-03 LAB
C TRACH RRNA SPEC QL NAA+PROBE: NEGATIVE
N GONORRHOEA RRNA SPEC QL NAA+PROBE: NEGATIVE
SPECIMEN SOURCE: NORMAL

## 2017-10-04 ENCOUNTER — HOSPITAL ENCOUNTER (EMERGENCY)
Age: 27
Discharge: HOME | End: 2017-10-04
Attending: EMERGENCY MEDICINE | Admitting: EMERGENCY MEDICINE
Payer: SELF-PAY

## 2017-10-04 VITALS
OXYGEN SATURATION: 100 % | WEIGHT: 160 LBS | HEART RATE: 69 BPM | HEIGHT: 66 IN | BODY MASS INDEX: 25.71 KG/M2 | TEMPERATURE: 98.6 F | DIASTOLIC BLOOD PRESSURE: 64 MMHG | SYSTOLIC BLOOD PRESSURE: 99 MMHG | RESPIRATION RATE: 17 BRPM

## 2017-10-04 DIAGNOSIS — O21.1 HYPEREMESIS GRAVIDARUM BEFORE END OF 22 WEEK GESTATION WITH DEHYDRATION: Primary | ICD-10-CM

## 2017-10-04 LAB
ALBUMIN SERPL-MCNC: 4.2 G/DL (ref 3.4–5)
ALBUMIN/GLOB SERPL: 1 {RATIO} (ref 0.8–1.7)
ALP SERPL-CCNC: 45 U/L (ref 45–117)
ALT SERPL-CCNC: 15 U/L (ref 13–56)
ANION GAP SERPL CALC-SCNC: 15 MMOL/L (ref 3–18)
APPEARANCE UR: ABNORMAL
AST SERPL-CCNC: 10 U/L (ref 15–37)
BACTERIA SPEC CULT: ABNORMAL
BACTERIA URNS QL MICRO: ABNORMAL /HPF
BASOPHILS # BLD: 0 K/UL (ref 0–0.1)
BASOPHILS NFR BLD: 0 % (ref 0–2)
BILIRUB SERPL-MCNC: 1.6 MG/DL (ref 0.2–1)
BILIRUB UR QL: NEGATIVE
BUN SERPL-MCNC: 11 MG/DL (ref 7–18)
BUN/CREAT SERPL: 13 (ref 12–20)
CALCIUM SERPL-MCNC: 9.2 MG/DL (ref 8.5–10.1)
CHLORIDE SERPL-SCNC: 102 MMOL/L (ref 100–108)
CO2 SERPL-SCNC: 20 MMOL/L (ref 21–32)
COLOR UR: YELLOW
CREAT SERPL-MCNC: 0.83 MG/DL (ref 0.6–1.3)
DIFFERENTIAL METHOD BLD: ABNORMAL
EOSINOPHIL # BLD: 0 K/UL (ref 0–0.4)
EOSINOPHIL NFR BLD: 0 % (ref 0–5)
EPITH CASTS URNS QL MICRO: ABNORMAL /LPF (ref 0–5)
ERYTHROCYTE [DISTWIDTH] IN BLOOD BY AUTOMATED COUNT: 12.9 % (ref 11.6–14.5)
GLOBULIN SER CALC-MCNC: 4.1 G/DL (ref 2–4)
GLUCOSE SERPL-MCNC: 91 MG/DL (ref 74–99)
GLUCOSE UR STRIP.AUTO-MCNC: NEGATIVE MG/DL
HCT VFR BLD AUTO: 37.1 % (ref 35–45)
HGB BLD-MCNC: 13.3 G/DL (ref 12–16)
HGB UR QL STRIP: NEGATIVE
KETONES UR QL STRIP.AUTO: >160 MG/DL
LEUKOCYTE ESTERASE UR QL STRIP.AUTO: ABNORMAL
LYMPHOCYTES # BLD: 0.9 K/UL (ref 0.9–3.6)
LYMPHOCYTES NFR BLD: 18 % (ref 21–52)
MCH RBC QN AUTO: 29.9 PG (ref 24–34)
MCHC RBC AUTO-ENTMCNC: 35.8 G/DL (ref 31–37)
MCV RBC AUTO: 83.4 FL (ref 74–97)
MONOCYTES # BLD: 0.4 K/UL (ref 0.05–1.2)
MONOCYTES NFR BLD: 8 % (ref 3–10)
MUCOUS THREADS URNS QL MICRO: ABNORMAL /LPF
NEUTS SEG # BLD: 3.5 K/UL (ref 1.8–8)
NEUTS SEG NFR BLD: 74 % (ref 40–73)
NITRITE UR QL STRIP.AUTO: NEGATIVE
PH UR STRIP: 5.5 [PH] (ref 5–8)
PLATELET # BLD AUTO: 222 K/UL (ref 135–420)
PMV BLD AUTO: 9.9 FL (ref 9.2–11.8)
POTASSIUM SERPL-SCNC: 3.1 MMOL/L (ref 3.5–5.5)
PROT SERPL-MCNC: 8.3 G/DL (ref 6.4–8.2)
PROT UR STRIP-MCNC: 30 MG/DL
RBC # BLD AUTO: 4.45 M/UL (ref 4.2–5.3)
RBC #/AREA URNS HPF: NEGATIVE /HPF (ref 0–5)
SERVICE CMNT-IMP: ABNORMAL
SODIUM SERPL-SCNC: 137 MMOL/L (ref 136–145)
SP GR UR REFRACTOMETRY: >1.03 (ref 1–1.03)
UROBILINOGEN UR QL STRIP.AUTO: 1 EU/DL (ref 0.2–1)
WBC # BLD AUTO: 4.8 K/UL (ref 4.6–13.2)
WBC URNS QL MICRO: ABNORMAL /HPF (ref 0–4)

## 2017-10-04 PROCEDURE — 96361 HYDRATE IV INFUSION ADD-ON: CPT

## 2017-10-04 PROCEDURE — 85025 COMPLETE CBC W/AUTO DIFF WBC: CPT | Performed by: EMERGENCY MEDICINE

## 2017-10-04 PROCEDURE — 99284 EMERGENCY DEPT VISIT MOD MDM: CPT

## 2017-10-04 PROCEDURE — 96365 THER/PROPH/DIAG IV INF INIT: CPT

## 2017-10-04 PROCEDURE — 80053 COMPREHEN METABOLIC PANEL: CPT | Performed by: EMERGENCY MEDICINE

## 2017-10-04 PROCEDURE — 81001 URINALYSIS AUTO W/SCOPE: CPT | Performed by: EMERGENCY MEDICINE

## 2017-10-04 PROCEDURE — 74011258636 HC RX REV CODE- 258/636: Performed by: EMERGENCY MEDICINE

## 2017-10-04 PROCEDURE — 96375 TX/PRO/DX INJ NEW DRUG ADDON: CPT

## 2017-10-04 PROCEDURE — 74011250636 HC RX REV CODE- 250/636: Performed by: EMERGENCY MEDICINE

## 2017-10-04 PROCEDURE — 74011000258 HC RX REV CODE- 258: Performed by: EMERGENCY MEDICINE

## 2017-10-04 RX ORDER — PROMETHAZINE HYDROCHLORIDE 25 MG/1
25 TABLET ORAL
Qty: 12 TAB | Refills: 0 | Status: SHIPPED | OUTPATIENT
Start: 2017-10-04 | End: 2017-11-15

## 2017-10-04 RX ORDER — ONDANSETRON 2 MG/ML
8 INJECTION INTRAMUSCULAR; INTRAVENOUS
Status: COMPLETED | OUTPATIENT
Start: 2017-10-04 | End: 2017-10-04

## 2017-10-04 RX ORDER — METOCLOPRAMIDE HYDROCHLORIDE 5 MG/ML
10 INJECTION INTRAMUSCULAR; INTRAVENOUS
Status: COMPLETED | OUTPATIENT
Start: 2017-10-04 | End: 2017-10-04

## 2017-10-04 RX ORDER — PROMETHAZINE HYDROCHLORIDE 25 MG/1
25 SUPPOSITORY RECTAL
Qty: 20 SUPPOSITORY | Refills: 0 | Status: SHIPPED | OUTPATIENT
Start: 2017-10-04 | End: 2017-10-04

## 2017-10-04 RX ORDER — PROMETHAZINE HYDROCHLORIDE 25 MG/1
25 SUPPOSITORY RECTAL
Qty: 20 SUPPOSITORY | Refills: 0 | Status: SHIPPED | OUTPATIENT
Start: 2017-10-04 | End: 2017-10-11

## 2017-10-04 RX ORDER — DEXTROSE, SODIUM CHLORIDE, SODIUM LACTATE, POTASSIUM CHLORIDE, AND CALCIUM CHLORIDE 5; .6; .31; .03; .02 G/100ML; G/100ML; G/100ML; G/100ML; G/100ML
1000 INJECTION, SOLUTION INTRAVENOUS CONTINUOUS
Status: DISCONTINUED | OUTPATIENT
Start: 2017-10-04 | End: 2017-10-04 | Stop reason: HOSPADM

## 2017-10-04 RX ORDER — SODIUM CHLORIDE 9 MG/ML
1000 INJECTION, SOLUTION INTRAVENOUS ONCE
Status: COMPLETED | OUTPATIENT
Start: 2017-10-04 | End: 2017-10-04

## 2017-10-04 RX ADMIN — SODIUM CHLORIDE, SODIUM LACTATE, POTASSIUM CHLORIDE, CALCIUM CHLORIDE, AND DEXTROSE MONOHYDRATE 1000 ML: 600; 310; 30; 20; 5 INJECTION, SOLUTION INTRAVENOUS at 13:28

## 2017-10-04 RX ADMIN — SODIUM CHLORIDE 1000 ML: 900 INJECTION, SOLUTION INTRAVENOUS at 12:25

## 2017-10-04 RX ADMIN — SODIUM CHLORIDE 1000 ML: 900 INJECTION, SOLUTION INTRAVENOUS at 10:20

## 2017-10-04 RX ADMIN — PROMETHAZINE HYDROCHLORIDE 25 MG: 25 INJECTION INTRAMUSCULAR; INTRAVENOUS at 13:27

## 2017-10-04 RX ADMIN — METOCLOPRAMIDE 10 MG: 5 INJECTION, SOLUTION INTRAMUSCULAR; INTRAVENOUS at 10:19

## 2017-10-04 RX ADMIN — CEFTRIAXONE SODIUM 1 G: 1 INJECTION, POWDER, FOR SOLUTION INTRAMUSCULAR; INTRAVENOUS at 10:19

## 2017-10-04 RX ADMIN — ONDANSETRON 8 MG: 2 INJECTION INTRAMUSCULAR; INTRAVENOUS at 12:24

## 2017-10-04 NOTE — DISCHARGE INSTRUCTIONS
Extreme Nausea and Vomiting in Pregnancy: Care Instructions  Your Care Instructions  Nausea and vomiting (often called morning sickness) are common in pregnancy. They are caused by pregnancy hormones and happen most often in the first 3 months. Some women get very sick and are not able to keep down food and fluids. This extreme morning sickness is called hyperemesis gravidarum. It can lead to a dangerous loss of fluids in the body. It also can keep you from gaining weight and getting proper nutrition during your pregnancy. Your body fluids are put back in balance with water and minerals called electrolytes. Medicine may help if you have severe nausea and vomiting. Follow-up care is a key part of your treatment and safety. Be sure to make and go to all appointments, and call your doctor if you are having problems. It's also a good idea to know your test results and keep a list of the medicines you take. How can you care for yourself at home? · Take your medicines exactly as prescribed. Call your doctor if you think you are having a problem with your medicine. · Drink plenty of fluids to prevent dehydration. Choose water and other caffeine-free clear liquids until you feel better. Try sipping on sports drinks that have salt and sugar in them. · Eat a small snack, such as crackers, before you get out of bed. Wait a few minutes, then get out of bed slowly. · Keep food in your stomach, but not too much at once. An empty stomach can make nausea worse. Eat several small meals every day instead of three large meals. · Eat more protein and less fat. · Get plenty of vitamin B6 by eating whole grains, nuts, seeds, and legumes. You can take vitamin B6 tablets if your doctor says it is okay. · Try to avoid smells and foods that make you feel sick to your stomach. · Get lots of rest.  · You may want to try acupressure bands.  They put pressure on an acupressure point in the wrist. Some women feel better using the bands.  · Amina may also help you feel better. You can use it in tea, take it as a pill, or use a amina syrup that you can buy at a health food store. When should you call for help? Call 911 anytime you think you may need emergency care. For example, call if:  · You passed out (lost consciousness). Call your doctor now or seek immediate medical care if:  · You vomit more than 3 times in a day, especially if you also have a fever or pain. · You are too sick to your stomach to drink any fluids. · You have signs of needing more fluids. You have sunken eyes and a dry mouth, and you pass only a little dark urine. · Your morning sickness gets worse or does not get better with home care. · You are not able to keep down your medicine. Watch closely for changes in your health, and be sure to contact your doctor if you have any problems. Where can you learn more? Go to http://marina-shana.info/. Enter A594 in the search box to learn more about \"Extreme Nausea and Vomiting in Pregnancy: Care Instructions. \"  Current as of: March 16, 2017  Content Version: 11.3  © 5210-9992 [employer]. Care instructions adapted under license by [employer] (which disclaims liability or warranty for this information). If you have questions about a medical condition or this instruction, always ask your healthcare professional. [place] [address] any warranty or liability for your use of this information.

## 2017-10-04 NOTE — ED TRIAGE NOTES
Patient states she is six weeks pregnant and cannot hold anything down. States she was here recently and Dx with \"an infection\" but has not been able to take antibiotics due to vomiting. Clindamycin and Macrobid bottles at bedside. Empty bottle of Zofran also noted.

## 2017-10-04 NOTE — ED NOTES
Patient resting comfortably in no distress. Mother at side. IV fluids continue to infuse. Will continue to monitor closely while awaiting further orders.

## 2017-10-04 NOTE — ED NOTES
Patient resting comfortably with family members at bedside. No ss distress. Awaiting urine. Will continue to monitor closely.

## 2017-10-04 NOTE — ED PROVIDER NOTES
HPI Comments: 10:08 AM  Genesis Guzman is a 32 y.o. 6 weeks pregnant female () with a PMHx of miscarriage presents to the ED c/o constant vomiting x 2 days. Also reports vaginal discharge. States pt was here 2 days ago and was found to have an infection and informed she was pregnant. Pt has not been able to take the Rx'ed Abx due to vomiting. Pt denies diarrhea, dysuria, vagibal bleeding. No other acute symptoms or complaints were noted. PCP: none     The history is provided by the patient (and mother). Past Medical History:   Diagnosis Date    Miscarriage        Past Surgical History:   Procedure Laterality Date    HX DILATION AND CURETTAGE           No family history on file. Social History     Social History    Marital status: SINGLE     Spouse name: N/A    Number of children: N/A    Years of education: N/A     Occupational History    Not on file. Social History Main Topics    Smoking status: Never Smoker    Smokeless tobacco: Never Used    Alcohol use No    Drug use: No    Sexual activity: Yes     Partners: Male     Birth control/ protection: Condom     Other Topics Concern    Not on file     Social History Narrative         ALLERGIES: Review of patient's allergies indicates no known allergies. Review of Systems   Gastrointestinal: Positive for vomiting. Negative for diarrhea. Genitourinary: Positive for vaginal discharge. Negative for dysuria and vaginal bleeding. All other systems reviewed and are negative. Vitals:    10/04/17 1000 10/04/17 1220 10/04/17 1543   BP: 109/56 111/69 99/64   Pulse: 74 71 69   Resp: 18  17   Temp: 97.8 °F (36.6 °C)  98.6 °F (37 °C)   SpO2: 100% 100% 100%   Weight: 72.6 kg (160 lb)     Height: 5' 6\" (1.676 m)              Physical Exam   Constitutional: She is oriented to person, place, and time. She appears well-developed and well-nourished. HENT:   Head: Normocephalic and atraumatic. Neck: Neck supple. No JVD present. Cardiovascular: Normal rate and regular rhythm. Pulmonary/Chest: Effort normal and breath sounds normal. No respiratory distress. Abdominal: Soft. She exhibits no distension. There is tenderness. There is no rebound and no guarding. Mild generalized tenderness   Musculoskeletal: She exhibits no edema. Neurological: She is alert and oriented to person, place, and time. Skin: Skin is warm and dry. No erythema. Psychiatric: Judgment normal.   Nursing note and vitals reviewed. MDM  Number of Diagnoses or Management Options  Diagnosis management comments: 33 y/o female presents with vomiting    Check labs, ua  Will give dose of abx for uti.      Had US 2 days ago, no need for repeat at this time    abd soft, non-surgical, doubt need for imaging       Amount and/or Complexity of Data Reviewed  Clinical lab tests: ordered and reviewed      ED Course       Procedures    Vitals:  Patient Vitals for the past 12 hrs:   Temp Pulse Resp BP SpO2   10/04/17 1543 98.6 °F (37 °C) 69 17 99/64 100 %   10/04/17 1220 - 71 17 111/69 100 %   10/04/17 1000 97.8 °F (36.6 °C) 74 18 109/56 100 %       Medications Ordered:  Medications   dextrose 5% lactated ringers infusion 1,000 mL (0 mL IntraVENous IV Completed 10/4/17 1543)   0.9% sodium chloride infusion 1,000 mL (0 mL IntraVENous IV Completed 10/4/17 1115)   metoclopramide HCl (REGLAN) injection 10 mg (10 mg IntraVENous Given 10/4/17 1019)   cefTRIAXone (ROCEPHIN) 1 g in 0.9% sodium chloride (MBP/ADV) 50 mL MBP (0 g IntraVENous IV Completed 10/4/17 1050)   0.9% sodium chloride infusion 1,000 mL (0 mL IntraVENous IV Completed 10/4/17 1331)   ondansetron (ZOFRAN) injection 8 mg (8 mg IntraVENous Given 10/4/17 1224)   promethazine (PHENERGAN) 25 mg in 0.9% sodium chloride 50 mL IVPB (25 mg IntraVENous Given 10/4/17 1327)       Lab Findings:  Recent Results (from the past 12 hour(s))   CBC WITH AUTOMATED DIFF    Collection Time: 10/04/17 10:02 AM   Result Value Ref Range WBC 4.8 4.6 - 13.2 K/uL    RBC 4.45 4.20 - 5.30 M/uL    HGB 13.3 12.0 - 16.0 g/dL    HCT 37.1 35.0 - 45.0 %    MCV 83.4 74.0 - 97.0 FL    MCH 29.9 24.0 - 34.0 PG    MCHC 35.8 31.0 - 37.0 g/dL    RDW 12.9 11.6 - 14.5 %    PLATELET 766 400 - 300 K/uL    MPV 9.9 9.2 - 11.8 FL    NEUTROPHILS 74 (H) 40 - 73 %    LYMPHOCYTES 18 (L) 21 - 52 %    MONOCYTES 8 3 - 10 %    EOSINOPHILS 0 0 - 5 %    BASOPHILS 0 0 - 2 %    ABS. NEUTROPHILS 3.5 1.8 - 8.0 K/UL    ABS. LYMPHOCYTES 0.9 0.9 - 3.6 K/UL    ABS. MONOCYTES 0.4 0.05 - 1.2 K/UL    ABS. EOSINOPHILS 0.0 0.0 - 0.4 K/UL    ABS. BASOPHILS 0.0 0.0 - 0.1 K/UL    DF AUTOMATED     METABOLIC PANEL, COMPREHENSIVE    Collection Time: 10/04/17 10:02 AM   Result Value Ref Range    Sodium 137 136 - 145 mmol/L    Potassium 3.1 (L) 3.5 - 5.5 mmol/L    Chloride 102 100 - 108 mmol/L    CO2 20 (L) 21 - 32 mmol/L    Anion gap 15 3.0 - 18 mmol/L    Glucose 91 74 - 99 mg/dL    BUN 11 7.0 - 18 MG/DL    Creatinine 0.83 0.6 - 1.3 MG/DL    BUN/Creatinine ratio 13 12 - 20      GFR est AA >60 >60 ml/min/1.73m2    GFR est non-AA >60 >60 ml/min/1.73m2    Calcium 9.2 8.5 - 10.1 MG/DL    Bilirubin, total 1.6 (H) 0.2 - 1.0 MG/DL    ALT (SGPT) 15 13 - 56 U/L    AST (SGOT) 10 (L) 15 - 37 U/L    Alk.  phosphatase 45 45 - 117 U/L    Protein, total 8.3 (H) 6.4 - 8.2 g/dL    Albumin 4.2 3.4 - 5.0 g/dL    Globulin 4.1 (H) 2.0 - 4.0 g/dL    A-G Ratio 1.0 0.8 - 1.7     URINALYSIS W/ RFLX MICROSCOPIC    Collection Time: 10/04/17 12:00 PM   Result Value Ref Range    Color YELLOW      Appearance CLOUDY      Specific gravity >1.030 (H) 1.005 - 1.030    pH (UA) 5.5 5.0 - 8.0      Protein 30 (A) NEG mg/dL    Glucose NEGATIVE  NEG mg/dL    Ketone >160 (A) NEG mg/dL    Bilirubin NEGATIVE  NEG      Blood NEGATIVE  NEG      Urobilinogen 1.0 0.2 - 1.0 EU/dL    Nitrites NEGATIVE  NEG      Leukocyte Esterase MODERATE (A) NEG     URINE MICROSCOPIC ONLY    Collection Time: 10/04/17 12:00 PM   Result Value Ref Range    WBC 0 to 3 0 - 4 /hpf    RBC NEGATIVE  0 - 5 /hpf    Epithelial cells 2+ 0 - 5 /lpf    Bacteria FEW (A) NEG /hpf    Mucus 4+ (A) NEG /lpf       Progress notes, consult notes, or additional procedure notes:  1:04 PM Consult: I discussed care with Dr. John Munoz (OB/GYN). It was a standard discussion including patient history, chief complaint, available diagnostic results, and predicted treatment course. Recommends to give pt bolis D5 LR with 25 mg Phenergan in it and then reevaluate pt.      3:20 PM Pt feeling better. Wants to start a po trial with some Ginger-Leanna     Pt tolerating PO, stable for dc home. Discussed results with pt. OB follow up. Diagnosis:   1. Hyperemesis gravidarum before end of 22 week gestation with dehydration        Disposition: Discharged     Follow-up Information     Follow up With Details Comments Contact Info    Vincent Valente MD Call in 1 day for OB/GYN follow up [address] [facility]ENC[facility] DEPT Go to As needed, If symptoms worsen [address] [place] [id number]  [phone]           Patient's Medications   Start Taking    PROMETHAZINE (PHENERGAN) 25 MG SUPPOSITORY    Insert 1 Suppository into rectum every six (6) hours as needed for Nausea for up to 7 days. PROMETHAZINE (PHENERGAN) 25 MG TABLET    Take 1 Tab by mouth every six (6) hours as needed for Nausea. Caution: This medication may make you drowsy. Avoid driving while under the influence of this medication. Continue Taking    CLINDAMYCIN (CLEOCIN) 300 MG CAPSULE    Take 1 Cap by mouth four (4) times daily for 7 days. NITROFURANTOIN, MACROCRYSTAL-MONOHYDRATE, (MACROBID) 100 MG CAPSULE    Take 1 Cap by mouth two (2) times a day for 3 days. ONDANSETRON (ZOFRAN ODT) 4 MG DISINTEGRATING TABLET    Take 1 Tab by mouth every eight (8) hours as needed for Nausea.    These Medications have changed    No medications on file   Stop Taking    No medications on file Scribe Attestation     Inez Johnson acting as a scribe for and in the presence of Canary FindDO haily      October 04, 2017 at 10:15 AM       Provider Attestation:      I personally performed the services described in the documentation, reviewed the documentation, as recorded by the scribe in my presence, and it accurately and completely records my words and actions.  October 04, 2017 at 10:15 AM - Diane FindDO haily

## 2017-10-07 ENCOUNTER — HOSPITAL ENCOUNTER (EMERGENCY)
Age: 27
Discharge: HOME | End: 2017-10-07
Attending: EMERGENCY MEDICINE
Payer: SELF-PAY

## 2017-10-07 VITALS
SYSTOLIC BLOOD PRESSURE: 100 MMHG | BODY MASS INDEX: 26.66 KG/M2 | DIASTOLIC BLOOD PRESSURE: 58 MMHG | HEART RATE: 78 BPM | WEIGHT: 160 LBS | TEMPERATURE: 98.3 F | RESPIRATION RATE: 16 BRPM | OXYGEN SATURATION: 100 % | HEIGHT: 65 IN

## 2017-10-07 DIAGNOSIS — O21.0 HYPEREMESIS GRAVIDARUM: Primary | ICD-10-CM

## 2017-10-07 DIAGNOSIS — E87.6 HYPOKALEMIA: ICD-10-CM

## 2017-10-07 DIAGNOSIS — E86.0 DEHYDRATION: ICD-10-CM

## 2017-10-07 LAB
ANION GAP SERPL CALC-SCNC: 14 MMOL/L (ref 3–18)
APPEARANCE UR: CLEAR
BACTERIA URNS QL MICRO: ABNORMAL /HPF
BASOPHILS # BLD: 0 K/UL (ref 0–0.1)
BASOPHILS NFR BLD: 0 % (ref 0–2)
BILIRUB UR QL: NEGATIVE
BUN SERPL-MCNC: 5 MG/DL (ref 7–18)
BUN/CREAT SERPL: 7 (ref 12–20)
CALCIUM SERPL-MCNC: 8.9 MG/DL (ref 8.5–10.1)
CHLORIDE SERPL-SCNC: 98 MMOL/L (ref 100–108)
CO2 SERPL-SCNC: 19 MMOL/L (ref 21–32)
COLOR UR: YELLOW
CREAT SERPL-MCNC: 0.71 MG/DL (ref 0.6–1.3)
DIFFERENTIAL METHOD BLD: ABNORMAL
EOSINOPHIL # BLD: 0.1 K/UL (ref 0–0.4)
EOSINOPHIL NFR BLD: 1 % (ref 0–5)
EPITH CASTS URNS QL MICRO: ABNORMAL /LPF (ref 0–5)
ERYTHROCYTE [DISTWIDTH] IN BLOOD BY AUTOMATED COUNT: 12.9 % (ref 11.6–14.5)
GLUCOSE SERPL-MCNC: 79 MG/DL (ref 74–99)
GLUCOSE UR STRIP.AUTO-MCNC: NEGATIVE MG/DL
HCT VFR BLD AUTO: 38 % (ref 35–45)
HGB BLD-MCNC: 13.5 G/DL (ref 12–16)
HGB UR QL STRIP: NEGATIVE
KETONES UR QL STRIP.AUTO: >160 MG/DL
LEUKOCYTE ESTERASE UR QL STRIP.AUTO: NEGATIVE
LYMPHOCYTES # BLD: 0.8 K/UL (ref 0.9–3.6)
LYMPHOCYTES NFR BLD: 11 % (ref 21–52)
MAGNESIUM SERPL-MCNC: 2.1 MG/DL (ref 1.6–2.6)
MCH RBC QN AUTO: 29.2 PG (ref 24–34)
MCHC RBC AUTO-ENTMCNC: 35.5 G/DL (ref 31–37)
MCV RBC AUTO: 82.3 FL (ref 74–97)
MONOCYTES # BLD: 0.6 K/UL (ref 0.05–1.2)
MONOCYTES NFR BLD: 9 % (ref 3–10)
MUCOUS THREADS URNS QL MICRO: ABNORMAL /LPF
NEUTS SEG # BLD: 5.4 K/UL (ref 1.8–8)
NEUTS SEG NFR BLD: 79 % (ref 40–73)
NITRITE UR QL STRIP.AUTO: NEGATIVE
PH UR STRIP: 6 [PH] (ref 5–8)
PLATELET # BLD AUTO: 217 K/UL (ref 135–420)
PMV BLD AUTO: 10.2 FL (ref 9.2–11.8)
POTASSIUM SERPL-SCNC: 2.9 MMOL/L (ref 3.5–5.5)
PROT UR STRIP-MCNC: 30 MG/DL
RBC # BLD AUTO: 4.62 M/UL (ref 4.2–5.3)
RBC #/AREA URNS HPF: ABNORMAL /HPF (ref 0–5)
SODIUM SERPL-SCNC: 131 MMOL/L (ref 136–145)
SP GR UR REFRACTOMETRY: >1.03 (ref 1–1.03)
UROBILINOGEN UR QL STRIP.AUTO: 1 EU/DL (ref 0.2–1)
WBC # BLD AUTO: 6.8 K/UL (ref 4.6–13.2)
WBC URNS QL MICRO: ABNORMAL /HPF (ref 0–4)

## 2017-10-07 PROCEDURE — 81001 URINALYSIS AUTO W/SCOPE: CPT | Performed by: EMERGENCY MEDICINE

## 2017-10-07 PROCEDURE — 96361 HYDRATE IV INFUSION ADD-ON: CPT

## 2017-10-07 PROCEDURE — 83735 ASSAY OF MAGNESIUM: CPT | Performed by: EMERGENCY MEDICINE

## 2017-10-07 PROCEDURE — 99283 EMERGENCY DEPT VISIT LOW MDM: CPT

## 2017-10-07 PROCEDURE — 74011250636 HC RX REV CODE- 250/636: Performed by: EMERGENCY MEDICINE

## 2017-10-07 PROCEDURE — 87086 URINE CULTURE/COLONY COUNT: CPT | Performed by: EMERGENCY MEDICINE

## 2017-10-07 PROCEDURE — 96375 TX/PRO/DX INJ NEW DRUG ADDON: CPT

## 2017-10-07 PROCEDURE — 96366 THER/PROPH/DIAG IV INF ADDON: CPT

## 2017-10-07 PROCEDURE — 80048 BASIC METABOLIC PNL TOTAL CA: CPT | Performed by: EMERGENCY MEDICINE

## 2017-10-07 PROCEDURE — 96365 THER/PROPH/DIAG IV INF INIT: CPT

## 2017-10-07 PROCEDURE — 74011250637 HC RX REV CODE- 250/637: Performed by: EMERGENCY MEDICINE

## 2017-10-07 PROCEDURE — 85025 COMPLETE CBC W/AUTO DIFF WBC: CPT | Performed by: EMERGENCY MEDICINE

## 2017-10-07 RX ORDER — POTASSIUM CHLORIDE 20 MEQ/1
40 TABLET, EXTENDED RELEASE ORAL
Status: COMPLETED | OUTPATIENT
Start: 2017-10-07 | End: 2017-10-07

## 2017-10-07 RX ORDER — METOCLOPRAMIDE 10 MG/1
10 TABLET ORAL
Status: COMPLETED | OUTPATIENT
Start: 2017-10-07 | End: 2017-10-07

## 2017-10-07 RX ORDER — POTASSIUM CHLORIDE 7.45 MG/ML
10 INJECTION INTRAVENOUS
Status: COMPLETED | OUTPATIENT
Start: 2017-10-07 | End: 2017-10-07

## 2017-10-07 RX ORDER — METOCLOPRAMIDE 10 MG/1
10 TABLET ORAL
Qty: 12 TAB | Refills: 0 | Status: SHIPPED | OUTPATIENT
Start: 2017-10-07 | End: 2017-10-17

## 2017-10-07 RX ORDER — METOCLOPRAMIDE HYDROCHLORIDE 5 MG/ML
10 INJECTION INTRAMUSCULAR; INTRAVENOUS
Status: COMPLETED | OUTPATIENT
Start: 2017-10-07 | End: 2017-10-07

## 2017-10-07 RX ADMIN — SODIUM CHLORIDE 2000 ML: 900 INJECTION, SOLUTION INTRAVENOUS at 15:44

## 2017-10-07 RX ADMIN — POTASSIUM CHLORIDE 10 MEQ: 10 INJECTION, SOLUTION INTRAVENOUS at 16:45

## 2017-10-07 RX ADMIN — METOCLOPRAMIDE 10 MG: 5 INJECTION, SOLUTION INTRAMUSCULAR; INTRAVENOUS at 15:41

## 2017-10-07 RX ADMIN — METOCLOPRAMIDE 10 MG: 10 TABLET ORAL at 18:21

## 2017-10-07 RX ADMIN — POTASSIUM CHLORIDE 40 MEQ: 20 TABLET, EXTENDED RELEASE ORAL at 16:42

## 2017-10-07 RX ADMIN — POTASSIUM CHLORIDE 10 MEQ: 10 INJECTION, SOLUTION INTRAVENOUS at 17:54

## 2017-10-07 NOTE — ED TRIAGE NOTES
Pt c/o emesis , pregnant with twins 7 weeks.  Pt unable to maintain fluids with abdominal pain, denies vaginal bleeding

## 2017-10-07 NOTE — DISCHARGE INSTRUCTIONS
Hypokalemia: Care Instructions  Your Care Instructions  Hypokalemia (say \"fk-sd-bae-RHONDA-shay-uh\") is a low level of potassium. The heart, muscles, kidneys, and nervous system all need potassium to work well. This problem has many different causes. Kidney problems, diet, and medicines like diuretics and laxatives can cause it. So can vomiting or diarrhea. In some cases, cancer is the cause. Your doctor may do tests to find the cause of your low potassium levels. You may need medicines to bring your potassium levels back to normal. You may also need regular blood tests to check your potassium. If you have very low potassium, you may need intravenous (IV) medicines. You also may need tests to check the electrical activity of your heart. Heart problems caused by low potassium levels can be very serious. Follow-up care is a key part of your treatment and safety. Be sure to make and go to all appointments, and call your doctor if you are having problems. It's also a good idea to know your test results and keep a list of the medicines you take. How can you care for yourself at home? · If your doctor recommends it, eat foods that have a lot of potassium. These include fresh fruits, juices, and vegetables. They also include nuts, beans, and milk. · Be safe with medicines. If your doctor prescribes medicines or potassium supplements, take them exactly as directed. Call your doctor if you have any problems with your medicines. · Get your potassium levels tested as often as your doctor tells you. When should you call for help? Call 911 anytime you think you may need emergency care. For example, call if:  · You feel like your heart is missing beats. Heart problems caused by low potassium can cause death. · You passed out (lost consciousness). · You have a seizure. Call your doctor now or seek immediate medical care if:  · You feel weak or unusually tired. · You have severe arm or leg cramps.   · You have tingling or numbness. · You feel sick to your stomach, or you vomit. · You have belly cramps. · You feel bloated or constipated. · You have to urinate a lot. · You feel very thirsty most of the time. · You are dizzy or lightheaded, or you feel like you may faint. · You feel depressed, or you lose touch with reality. Watch closely for changes in your health, and be sure to contact your doctor if:  · You do not get better as expected. Where can you learn more? Go to http://marina-shana.info/. Enter G358 in the search box to learn more about \"Hypokalemia: Care Instructions. \"  Current as of: July 28, 2016  Content Version: 11.3  © 2768-1457 [employer]. Care instructions adapted under license by [employer] (which disclaims liability or warranty for this information). If you have questions about a medical condition or this instruction, always ask your healthcare professional. [place] [address] any warranty or liability for your use of this information. Electrolyte Imbalance: Care Instructions  Your Care Instructions  Electrolytes are minerals in your blood. They include sodium, potassium, calcium, and magnesium. When they are not at the right levels, you can feel very ill. You may not know what is causing it, but you know something is wrong. You may feel weak or numb, have muscle spasms, or twitch. Your heart may beat fast. Symptoms are different with each mineral. Too much is as bad as too little. Minerals help keep your body working as it should. Vomiting, diarrhea, and fever can cause you to lose minerals. A problem with your kidneys can tip a mineral out of balance. So can taking certain medicines. Your doctor may do more tests. He or she may change your medicine and diet. If you are low in one or more minerals, they may be given through a tube into your vein (IV). Your doctor may have you take or drink special fluids or pills.  If your kidneys are failing, your blood may be filtered. This is called dialysis. It can put the minerals back in balance. Follow-up care is a key part of your treatment and safety. Be sure to make and go to all appointments, and call your doctor if you are having problems. It's also a good idea to know your test results and keep a list of the medicines you take. How can you care for yourself at home? · Take your medicines exactly as prescribed. Call your doctor if you have any problems with your medicines. You will get more details on the specific medicines your doctor prescribes. · Do not take any medicine without talking to your doctor first. This includes prescription, over-the-counter, and herbal medicines. · If you have kidney, heart, or liver disease and have to limit fluids, talk with your doctor before you increase the amount of fluids you drink. · Your doctor or dietitian may give you a diet plan to help balance your minerals. Follow the diet carefully. When should you call for help? Call 911 anytime you think you may need emergency care. For example, call if:  · You passed out (lost consciousness). · Your heart seems to be speeding up and then slowing down or skipping beats. Call your doctor now or seek immediate medical care if:  · You are very tired and have no energy. · You have trouble thinking or concentrating. Watch closely for changes in your health, and be sure to contact your doctor if:  · You want advice on what to do to keep your minerals in balance. · You do not get better as expected. Where can you learn more? Go to http://marina-shana.info/. Enter W361 in the search box to learn more about \"Electrolyte Imbalance: Care Instructions. \"  Current as of: July 26, 2016  Content Version: 11.3  © 6977-1701 [employer]. Care instructions adapted under license by [employer] (which disclaims liability or warranty for this information).  If you have questions about a medical condition or this instruction, always ask your healthcare professional. [address] any warranty or liability for your use of this information. Oral Rehydration: Care Instructions  Your Care Instructions  Dehydration occurs when your body loses too much water. This can happen if you do not drink enough fluids or lose a lot of fluid due to diarrhea, vomiting, or sweating. Being dehydrated can cause health problems and can even be life-threatening. To replace lost fluids, you need to drink liquid that contains special chemicals called electrolytes. Electrolytes keep your body working well. Plain water does not have electrolytes. You also need to rest to prevent more fluid loss. Replacing water and electrolytes (oral rehydration) completely takes about 36 hours. But you should feel better within a few hours. Follow-up care is a key part of your treatment and safety. Be sure to make and go to all appointments, and call your doctor if you are having problems. It's also a good idea to know your test results and keep a list of the medicines you take. How can you care for yourself at home? · Take frequent sips of a drink such as Gatorade, Powerade, or other rehydration drinks that your doctor suggests. These replace both fluid and important chemicals (electrolytes) you need for balance in your blood. · Drink 2 quarts of cool liquid over 2 to 4 hours. You should have at least 10 glasses of liquid a day to replace lost fluid. If you have kidney, heart, or liver disease and have to limit fluids, talk with your doctor before you increase the amount of fluids you drink. · Make your own drink. Measure everything carefully. The drink may not work well or may even be harmful if the amounts are off. ¨ 1 quart water  ¨ ½ teaspoon salt  ¨ 6 teaspoons sugar  · Do not drink liquid with caffeine, such as coffee and george. · Do not drink any alcohol.  It can make you dehydrated. · Drink plenty of fluids, enough so that your urine is light yellow or clear like water. If you have kidney, heart, or liver disease and have to limit fluids, talk with your doctor before you increase the amount of fluids you drink. When should you call for help? Call 911 anytime you think you may need emergency care. For example, call if:  · You have signs of severe dehydration, such as:  ¨ You are confused or unable to stay awake. ¨ You passed out (lost consciousness). Call your doctor now or seek immediate medical care if:  · You still have signs of dehydration. You have sunken eyes and a dry mouth, and you pass only a little dark urine. · You are dizzy or lightheaded, or you feel like you may faint. · You are not able to keep down fluids. Watch closely for changes in your health, and be sure to contact your doctor if:  · You do not get better as expected. Where can you learn more? Go to http://marina-shana.info/. Enter I040 in the search box to learn more about \"Oral Rehydration: Care Instructions. \"  Current as of: March 20, 2017  Content Version: 11.3  © 5530-4809 [employer]. Care instructions adapted under license by [employer] (which disclaims liability or warranty for this information). If you have questions about a medical condition or this instruction, always ask your healthcare professional. [address] any warranty or liability for your use of this information. Electrolyte Imbalance: Care Instructions  Your Care Instructions  Electrolytes are minerals in your blood. They include sodium, potassium, calcium, and magnesium. When they are not at the right levels, you can feel very ill. You may not know what is causing it, but you know something is wrong. You may feel weak or numb, have muscle spasms, or twitch.  Your heart may beat fast. Symptoms are different with each mineral. Too much is as bad as too little. Minerals help keep your body working as it should. Vomiting, diarrhea, and fever can cause you to lose minerals. A problem with your kidneys can tip a mineral out of balance. So can taking certain medicines. Your doctor may do more tests. He or she may change your medicine and diet. If you are low in one or more minerals, they may be given through a tube into your vein (IV). Your doctor may have you take or drink special fluids or pills. If your kidneys are failing, your blood may be filtered. This is called dialysis. It can put the minerals back in balance. Follow-up care is a key part of your treatment and safety. Be sure to make and go to all appointments, and call your doctor if you are having problems. It's also a good idea to know your test results and keep a list of the medicines you take. How can you care for yourself at home? · Take your medicines exactly as prescribed. Call your doctor if you have any problems with your medicines. You will get more details on the specific medicines your doctor prescribes. · Do not take any medicine without talking to your doctor first. This includes prescription, over-the-counter, and herbal medicines. · If you have kidney, heart, or liver disease and have to limit fluids, talk with your doctor before you increase the amount of fluids you drink. · Your doctor or dietitian may give you a diet plan to help balance your minerals. Follow the diet carefully. When should you call for help? Call 911 anytime you think you may need emergency care. For example, call if:  · You passed out (lost consciousness). · Your heart seems to be speeding up and then slowing down or skipping beats. Call your doctor now or seek immediate medical care if:  · You are very tired and have no energy. · You have trouble thinking or concentrating.   Watch closely for changes in your health, and be sure to contact your doctor if:  · You want advice on what to do to keep your minerals in balance. · You do not get better as expected. Where can you learn more? Go to http://marina-shana.info/. Enter W108 in the search box to learn more about \"Electrolyte Imbalance: Care Instructions. \"  Current as of: July 26, 2016  Content Version: 11.3  © 6861-1527 [employer]. Care instructions adapted under license by [employer] (which disclaims liability or warranty for this information). If you have questions about a medical condition or this instruction, always ask your healthcare professional. [place] [address] any warranty or liability for your use of this information. Dehydration: Care Instructions  Your Care Instructions  Dehydration happens when your body loses too much fluid. This might happen when you do not drink enough water or you lose large amounts of fluids from your body because of diarrhea, vomiting, or sweating. Severe dehydration can be life-threatening. Water and minerals called electrolytes help put your body fluids back in balance. Learn the early signs of fluid loss, and drink more fluids to prevent dehydration. Follow-up care is a key part of your treatment and safety. Be sure to make and go to all appointments, and call your doctor if you are having problems. It's also a good idea to know your test results and keep a list of the medicines you take. How can you care for yourself at home? · To prevent dehydration, drink plenty of fluids, enough so that your urine is light yellow or clear like water. Choose water and other caffeine-free clear liquids until you feel better. If you have kidney, heart, or liver disease and have to limit fluids, talk with your doctor before you increase the amount of fluids you drink. · If you do not feel like eating or drinking, try taking small sips of water, sports drinks, or other rehydration drinks.   · Get plenty of rest.  To prevent dehydration  · Add more fluids to your diet and daily routine, unless your doctor has told you not to. · During hot weather, drink more fluids. Drink even more fluids if you exercise a lot. Stay away from drinks with alcohol or caffeine. · Watch for the symptoms of dehydration. These include:  ¨ A dry, sticky mouth. ¨ Dark yellow urine, and not much of it. ¨ Dry and sunken eyes. ¨ Feeling very tired. · Learn what problems can lead to dehydration. These include:  ¨ Diarrhea, fever, and vomiting. ¨ Any illness with a fever, such as pneumonia or the flu. ¨ Activities that cause heavy sweating, such as endurance races and heavy outdoor work in hot or humid weather. ¨ Alcohol or drug abuse or withdrawal.  ¨ Certain medicines, such as cold and allergy pills (antihistamines), diet pills (diuretics), and laxatives. ¨ Certain diseases, such as diabetes, cancer, and heart or kidney disease. When should you call for help? Call 911 anytime you think you may need emergency care. For example, call if:  · You passed out (lost consciousness). Call your doctor now or seek immediate medical care if:  · You are confused and cannot think clearly. · You are dizzy or lightheaded, or you feel like you may faint. · You have signs of needing more fluids. You have sunken eyes and a dry mouth, and you pass only a little dark urine. · You cannot keep fluids down. Watch closely for changes in your health, and be sure to contact your doctor if:  · You are not making tears. · Your skin is very dry and sags slowly back into place after you pinch it. · Your mouth and eyes are very dry. Where can you learn more? Go to http://marina-shana.info/. Enter I420 in the search box to learn more about \"Dehydration: Care Instructions. \"  Current as of: March 20, 2017  Content Version: 11.3  © 1594-7072 [employer]. Care instructions adapted under license by [employer] (which disclaims liability or warranty for this information).  If you have questions about a medical condition or this instruction, always ask your healthcare professional. [address] any warranty or liability for your use of this information. Hypokalemia: Care Instructions  Your Care Instructions  Hypokalemia (say \"fa-qf-dsq-RHONDA-shay-uh\") is a low level of potassium. The heart, muscles, kidneys, and nervous system all need potassium to work well. This problem has many different causes. Kidney problems, diet, and medicines like diuretics and laxatives can cause it. So can vomiting or diarrhea. In some cases, cancer is the cause. Your doctor may do tests to find the cause of your low potassium levels. You may need medicines to bring your potassium levels back to normal. You may also need regular blood tests to check your potassium. If you have very low potassium, you may need intravenous (IV) medicines. You also may need tests to check the electrical activity of your heart. Heart problems caused by low potassium levels can be very serious. Follow-up care is a key part of your treatment and safety. Be sure to make and go to all appointments, and call your doctor if you are having problems. It's also a good idea to know your test results and keep a list of the medicines you take. How can you care for yourself at home? · If your doctor recommends it, eat foods that have a lot of potassium. These include fresh fruits, juices, and vegetables. They also include nuts, beans, and milk. · Be safe with medicines. If your doctor prescribes medicines or potassium supplements, take them exactly as directed. Call your doctor if you have any problems with your medicines. · Get your potassium levels tested as often as your doctor tells you. When should you call for help? Call 911 anytime you think you may need emergency care. For example, call if:  · You feel like your heart is missing beats. Heart problems caused by low potassium can cause death.   · You passed out (lost consciousness). · You have a seizure. Call your doctor now or seek immediate medical care if:  · You feel weak or unusually tired. · You have severe arm or leg cramps. · You have tingling or numbness. · You feel sick to your stomach, or you vomit. · You have belly cramps. · You feel bloated or constipated. · You have to urinate a lot. · You feel very thirsty most of the time. · You are dizzy or lightheaded, or you feel like you may faint. · You feel depressed, or you lose touch with reality. Watch closely for changes in your health, and be sure to contact your doctor if:  · You do not get better as expected. Where can you learn more? Go to http://marina-shana.info/. Enter G358 in the search box to learn more about \"Hypokalemia: Care Instructions. \"  Current as of: July 28, 2016  Content Version: 11.3  © 3487-8741 [employer]. Care instructions adapted under license by [id number] (which disclaims liability or warranty for this information). If you have questions about a medical condition or this instruction, always ask your healthcare professional. [place] [address] any warranty or liability for your use of this information.

## 2017-10-07 NOTE — ED PROVIDER NOTES
Patient is a 32 y.o. female presenting with pregnancy problem and vomiting. The history is provided by the patient. Pregnancy Problem   Associated symptoms include abdominal pain. Vomiting    Associated symptoms include abdominal pain. Pertinent negatives include no fever. Pt presents with multiple days of vomiting; twin gestation on US 10/2/17. [id number]. Taking zofran and Phenergan w/o change and not tolerating her PO ABX for her UTI. Not yet seen OB provider. Denies hematemesis, diarrhea, fever, dysuria. Feels very dehydrated. Denies ETOH, tobacco, illicits. Past Medical History:   Diagnosis Date    Miscarriage        Past Surgical History:   Procedure Laterality Date    HX DILATION AND CURETTAGE           History reviewed. No pertinent family history. Social History     Social History    Marital status: SINGLE     Spouse name: N/A    Number of children: N/A    Years of education: N/A     Occupational History    Not on file. Social History Main Topics    Smoking status: Never Smoker    Smokeless tobacco: Never Used    Alcohol use No    Drug use: No    Sexual activity: Yes     Partners: Male     Birth control/ protection: Condom     Other Topics Concern    Not on file     Social History Narrative         ALLERGIES: Review of patient's allergies indicates no known allergies. Review of Systems   Constitutional: Positive for appetite change. Negative for fever. HENT: Negative. Eyes: Negative. Respiratory: Negative. Cardiovascular: Negative. Gastrointestinal: Positive for abdominal pain, nausea and vomiting. Endocrine: Negative. Genitourinary: Negative. Negative for vaginal bleeding and vaginal discharge. Musculoskeletal: Negative. Skin: Negative. Allergic/Immunologic: Negative. Neurological: Negative. Hematological: Negative. Psychiatric/Behavioral: Negative. All other systems reviewed and are negative.       Vitals:    10/07/17 1502 BP: 100/58   Pulse: 78   Resp: 16   Temp: 98.3 °F (36.8 °C)   SpO2: 100%   Weight: 72.6 kg (160 lb)   Height: 5' 5\" (1.651 m)            Physical Exam   Constitutional: She is oriented to person, place, and time. She appears well-developed. HENT:   Head: Normocephalic and atraumatic. Eyes: Pupils are equal, round, and reactive to light. Neck: No JVD present. No tracheal deviation present. No thyromegaly present. Cardiovascular: Normal rate, regular rhythm and normal heart sounds. Exam reveals no gallop and no friction rub. No murmur heard. Pulmonary/Chest: Effort normal and breath sounds normal. No stridor. No respiratory distress. She has no wheezes. She has no rales. She exhibits no tenderness. Abdominal: Soft. She exhibits no distension and no mass. There is tenderness. There is no rebound and no guarding.   +moderate epigastric TTP   Musculoskeletal: She exhibits no edema or tenderness. Lymphadenopathy:     She has no cervical adenopathy. Neurological: She is alert and oriented to person, place, and time. Skin: Skin is warm and dry. No rash noted. No erythema. No pallor. Psychiatric: She has a normal mood and affect. Her behavior is normal. Thought content normal.   Nursing note and vitals reviewed. MDM  Number of Diagnoses or Management Options  Dehydration:   Hyperemesis gravidarum:   Hypokalemia:   Diagnosis management comments: Differential: UTI; hyperemesis gravidarum; dehydration; electrolyte abnormalities    Giving IVF and replacing potassium. Pt says she feels so much better and has tolerated apple juice and rory crackers. Urine sent for culture.        Amount and/or Complexity of Data Reviewed  Clinical lab tests: ordered and reviewed      ED Course       Procedures      Recent Results (from the past 12 hour(s))   CBC WITH AUTOMATED DIFF    Collection Time: 10/07/17  3:34 PM   Result Value Ref Range    WBC 6.8 4.6 - 13.2 K/uL    RBC 4.62 4.20 - 5.30 M/uL    HGB 13.5 12.0 - 16.0 g/dL    HCT 38.0 35.0 - 45.0 %    MCV 82.3 74.0 - 97.0 FL    MCH 29.2 24.0 - 34.0 PG    MCHC 35.5 31.0 - 37.0 g/dL    RDW 12.9 11.6 - 14.5 %    PLATELET 373 351 - 002 K/uL    MPV 10.2 9.2 - 11.8 FL    NEUTROPHILS 79 (H) 40 - 73 %    LYMPHOCYTES 11 (L) 21 - 52 %    MONOCYTES 9 3 - 10 %    EOSINOPHILS 1 0 - 5 %    BASOPHILS 0 0 - 2 %    ABS. NEUTROPHILS 5.4 1.8 - 8.0 K/UL    ABS. LYMPHOCYTES 0.8 (L) 0.9 - 3.6 K/UL    ABS. MONOCYTES 0.6 0.05 - 1.2 K/UL    ABS. EOSINOPHILS 0.1 0.0 - 0.4 K/UL    ABS. BASOPHILS 0.0 0.0 - 0.1 K/UL    DF AUTOMATED     METABOLIC PANEL, BASIC    Collection Time: 10/07/17  3:34 PM   Result Value Ref Range    Sodium 131 (L) 136 - 145 mmol/L    Potassium 2.9 (LL) 3.5 - 5.5 mmol/L    Chloride 98 (L) 100 - 108 mmol/L    CO2 19 (L) 21 - 32 mmol/L    Anion gap 14 3.0 - 18 mmol/L    Glucose 79 74 - 99 mg/dL    BUN 5 (L) 7.0 - 18 MG/DL    Creatinine 0.71 0.6 - 1.3 MG/DL    BUN/Creatinine ratio 7 (L) 12 - 20      GFR est AA >60 >60 ml/min/1.73m2    GFR est non-AA >60 >60 ml/min/1.73m2    Calcium 8.9 8.5 - 10.1 MG/DL   URINALYSIS W/ RFLX MICROSCOPIC    Collection Time: 10/07/17  3:34 PM   Result Value Ref Range    Color YELLOW      Appearance CLEAR      Specific gravity >1.030 (H) 1.005 - 1.030    pH (UA) 6.0 5.0 - 8.0      Protein 30 (A) NEG mg/dL    Glucose NEGATIVE  NEG mg/dL    Ketone >160 (A) NEG mg/dL    Bilirubin NEGATIVE  NEG      Blood NEGATIVE  NEG      Urobilinogen 1.0 0.2 - 1.0 EU/dL    Nitrites NEGATIVE  NEG      Leukocyte Esterase NEGATIVE  NEG     URINE MICROSCOPIC ONLY    Collection Time: 10/07/17  3:34 PM   Result Value Ref Range    WBC 4 to 10 0 - 4 /hpf    RBC 0 to 3 0 - 5 /hpf    Epithelial cells 3+ 0 - 5 /lpf    Bacteria FEW (A) NEG /hpf    Mucus 3+ (A) NEG /lpf   MAGNESIUM    Collection Time: 10/07/17  3:34 PM   Result Value Ref Range    Magnesium 2.1 1.6 - 2.6 mg/dL      5:47 PM  Diagnosis:   1. Hyperemesis gravidarum    2. Hypokalemia    3.  Dehydration Disposition: home    Follow-up Information     Follow up With Details Comments Tiffani Bonner MD Schedule an appointment as soon as possible for a visit in 2 days  Cornelio Williams  [phone]      [facility]  If symptoms worsen return immediately [address] [facility] [address]  [phone]          Patient's Medications   Start Taking    METOCLOPRAMIDE HCL (REGLAN) 10 MG TABLET    Take 1 Tab by mouth every six (6) hours as needed for Nausea for up to 10 days. Continue Taking    CLINDAMYCIN (CLEOCIN) 300 MG CAPSULE    Take 1 Cap by mouth four (4) times daily for 7 days. ONDANSETRON (ZOFRAN ODT) 4 MG DISINTEGRATING TABLET    Take 1 Tab by mouth every eight (8) hours as needed for Nausea. PROMETHAZINE (PHENERGAN) 25 MG SUPPOSITORY    Insert 1 Suppository into rectum every six (6) hours as needed for Nausea for up to 7 days. PROMETHAZINE (PHENERGAN) 25 MG TABLET    Take 1 Tab by mouth every six (6) hours as needed for Nausea. Caution: This medication may make you drowsy. Avoid driving while under the influence of this medication.    These Medications have changed    No medications on file   Stop Taking    No medications on file

## 2017-10-09 LAB
BACTERIA SPEC CULT: NORMAL
SERVICE CMNT-IMP: NORMAL

## 2017-10-27 ENCOUNTER — APPOINTMENT (OUTPATIENT)
Dept: ULTRASOUND IMAGING | Age: 27
End: 2017-10-27
Attending: PHYSICIAN ASSISTANT
Payer: SELF-PAY

## 2017-10-27 ENCOUNTER — HOSPITAL ENCOUNTER (EMERGENCY)
Age: 27
Discharge: HOME | End: 2017-10-27
Attending: EMERGENCY MEDICINE
Payer: SELF-PAY

## 2017-10-27 VITALS
HEART RATE: 80 BPM | TEMPERATURE: 99 F | OXYGEN SATURATION: 100 % | HEIGHT: 65 IN | RESPIRATION RATE: 18 BRPM | DIASTOLIC BLOOD PRESSURE: 72 MMHG | BODY MASS INDEX: 24.99 KG/M2 | WEIGHT: 150 LBS | SYSTOLIC BLOOD PRESSURE: 108 MMHG

## 2017-10-27 DIAGNOSIS — O21.0 HYPEREMESIS GRAVIDARUM: Primary | ICD-10-CM

## 2017-10-27 DIAGNOSIS — O23.41 URINARY TRACT INFECTION IN MOTHER DURING FIRST TRIMESTER OF PREGNANCY: ICD-10-CM

## 2017-10-27 DIAGNOSIS — Z3A.09 9 WEEKS GESTATION OF PREGNANCY: ICD-10-CM

## 2017-10-27 LAB
ALBUMIN SERPL-MCNC: 4 G/DL (ref 3.4–5)
ALBUMIN/GLOB SERPL: 0.9 {RATIO} (ref 0.8–1.7)
ALP SERPL-CCNC: 44 U/L (ref 45–117)
ALT SERPL-CCNC: 16 U/L (ref 13–56)
ANION GAP SERPL CALC-SCNC: 8 MMOL/L (ref 3–18)
APPEARANCE UR: ABNORMAL
AST SERPL-CCNC: 9 U/L (ref 15–37)
BACTERIA URNS QL MICRO: ABNORMAL /HPF
BASOPHILS # BLD: 0 K/UL (ref 0–0.1)
BASOPHILS NFR BLD: 0 % (ref 0–2)
BILIRUB SERPL-MCNC: 0.9 MG/DL (ref 0.2–1)
BILIRUB UR QL: NEGATIVE
BUN SERPL-MCNC: 9 MG/DL (ref 7–18)
BUN/CREAT SERPL: 13 (ref 12–20)
CALCIUM SERPL-MCNC: 9 MG/DL (ref 8.5–10.1)
CHLORIDE SERPL-SCNC: 102 MMOL/L (ref 100–108)
CO2 SERPL-SCNC: 26 MMOL/L (ref 21–32)
COLOR UR: YELLOW
CREAT SERPL-MCNC: 0.69 MG/DL (ref 0.6–1.3)
DIFFERENTIAL METHOD BLD: ABNORMAL
EOSINOPHIL # BLD: 0 K/UL (ref 0–0.4)
EOSINOPHIL NFR BLD: 1 % (ref 0–5)
EPITH CASTS URNS QL MICRO: ABNORMAL /LPF (ref 0–5)
ERYTHROCYTE [DISTWIDTH] IN BLOOD BY AUTOMATED COUNT: 13.3 % (ref 11.6–14.5)
GLOBULIN SER CALC-MCNC: 4.5 G/DL (ref 2–4)
GLUCOSE SERPL-MCNC: 96 MG/DL (ref 74–99)
GLUCOSE UR STRIP.AUTO-MCNC: NEGATIVE MG/DL
HCG SERPL-ACNC: ABNORMAL MIU/ML (ref 0–10)
HCT VFR BLD AUTO: 39.2 % (ref 35–45)
HGB BLD-MCNC: 13.6 G/DL (ref 12–16)
HGB UR QL STRIP: ABNORMAL
KETONES UR QL STRIP.AUTO: >160 MG/DL
LEUKOCYTE ESTERASE UR QL STRIP.AUTO: ABNORMAL
LYMPHOCYTES # BLD: 1 K/UL (ref 0.9–3.6)
LYMPHOCYTES NFR BLD: 20 % (ref 21–52)
MAGNESIUM SERPL-MCNC: 2.1 MG/DL (ref 1.6–2.6)
MCH RBC QN AUTO: 30 PG (ref 24–34)
MCHC RBC AUTO-ENTMCNC: 34.7 G/DL (ref 31–37)
MCV RBC AUTO: 86.3 FL (ref 74–97)
MONOCYTES # BLD: 0.3 K/UL (ref 0.05–1.2)
MONOCYTES NFR BLD: 7 % (ref 3–10)
NEUTS SEG # BLD: 3.5 K/UL (ref 1.8–8)
NEUTS SEG NFR BLD: 72 % (ref 40–73)
NITRITE UR QL STRIP.AUTO: POSITIVE
PH UR STRIP: 6 [PH] (ref 5–8)
PLATELET # BLD AUTO: 232 K/UL (ref 135–420)
PMV BLD AUTO: 10.1 FL (ref 9.2–11.8)
POTASSIUM SERPL-SCNC: 3.6 MMOL/L (ref 3.5–5.5)
PROT SERPL-MCNC: 8.5 G/DL (ref 6.4–8.2)
PROT UR STRIP-MCNC: 30 MG/DL
RBC # BLD AUTO: 4.54 M/UL (ref 4.2–5.3)
RBC #/AREA URNS HPF: ABNORMAL /HPF (ref 0–5)
SODIUM SERPL-SCNC: 136 MMOL/L (ref 136–145)
SP GR UR REFRACTOMETRY: >1.03 (ref 1–1.03)
UROBILINOGEN UR QL STRIP.AUTO: 1 EU/DL (ref 0.2–1)
WBC # BLD AUTO: 4.8 K/UL (ref 4.6–13.2)
WBC URNS QL MICRO: ABNORMAL /HPF (ref 0–4)

## 2017-10-27 PROCEDURE — 81001 URINALYSIS AUTO W/SCOPE: CPT | Performed by: PHYSICIAN ASSISTANT

## 2017-10-27 PROCEDURE — 87186 SC STD MICRODIL/AGAR DIL: CPT | Performed by: PHYSICIAN ASSISTANT

## 2017-10-27 PROCEDURE — 74011250636 HC RX REV CODE- 250/636: Performed by: PHYSICIAN ASSISTANT

## 2017-10-27 PROCEDURE — 84702 CHORIONIC GONADOTROPIN TEST: CPT | Performed by: PHYSICIAN ASSISTANT

## 2017-10-27 PROCEDURE — 83735 ASSAY OF MAGNESIUM: CPT | Performed by: PHYSICIAN ASSISTANT

## 2017-10-27 PROCEDURE — 76817 TRANSVAGINAL US OBSTETRIC: CPT

## 2017-10-27 PROCEDURE — 87077 CULTURE AEROBIC IDENTIFY: CPT | Performed by: PHYSICIAN ASSISTANT

## 2017-10-27 PROCEDURE — 99283 EMERGENCY DEPT VISIT LOW MDM: CPT

## 2017-10-27 PROCEDURE — 85025 COMPLETE CBC W/AUTO DIFF WBC: CPT | Performed by: PHYSICIAN ASSISTANT

## 2017-10-27 PROCEDURE — 96361 HYDRATE IV INFUSION ADD-ON: CPT

## 2017-10-27 PROCEDURE — 96374 THER/PROPH/DIAG INJ IV PUSH: CPT

## 2017-10-27 PROCEDURE — 80053 COMPREHEN METABOLIC PANEL: CPT | Performed by: PHYSICIAN ASSISTANT

## 2017-10-27 PROCEDURE — 87086 URINE CULTURE/COLONY COUNT: CPT | Performed by: PHYSICIAN ASSISTANT

## 2017-10-27 RX ORDER — METOCLOPRAMIDE 10 MG/1
10 TABLET ORAL
Qty: 20 TAB | Refills: 0 | Status: SHIPPED | OUTPATIENT
Start: 2017-10-27 | End: 2017-11-17

## 2017-10-27 RX ORDER — METOCLOPRAMIDE HYDROCHLORIDE 5 MG/ML
10 INJECTION INTRAMUSCULAR; INTRAVENOUS
Status: COMPLETED | OUTPATIENT
Start: 2017-10-27 | End: 2017-10-27

## 2017-10-27 RX ORDER — SODIUM CHLORIDE 9 MG/ML
1000 INJECTION, SOLUTION INTRAVENOUS ONCE
Status: COMPLETED | OUTPATIENT
Start: 2017-10-27 | End: 2017-10-27

## 2017-10-27 RX ORDER — NITROFURANTOIN 25; 75 MG/1; MG/1
100 CAPSULE ORAL 2 TIMES DAILY
Qty: 14 CAP | Refills: 0 | Status: SHIPPED | OUTPATIENT
Start: 2017-10-27 | End: 2017-11-03

## 2017-10-27 RX ADMIN — METOCLOPRAMIDE 10 MG: 5 INJECTION, SOLUTION INTRAMUSCULAR; INTRAVENOUS at 20:20

## 2017-10-27 RX ADMIN — SODIUM CHLORIDE 1000 ML: 9 INJECTION, SOLUTION INTRAVENOUS at 20:19

## 2017-10-28 NOTE — DISCHARGE INSTRUCTIONS
Extreme Nausea and Vomiting in Pregnancy: Care Instructions  Your Care Instructions    Nausea and vomiting (often called morning sickness) are common in pregnancy. They are caused by pregnancy hormones and happen most often in the first 3 months. Some women get very sick and are not able to keep down food and fluids. This extreme morning sickness is called hyperemesis gravidarum. It can lead to a dangerous loss of fluids in the body. It also can keep you from gaining weight and getting proper nutrition during your pregnancy. Your body fluids are put back in balance with water and minerals called electrolytes. Medicine may help if you have severe nausea and vomiting. Follow-up care is a key part of your treatment and safety. Be sure to make and go to all appointments, and call your doctor if you are having problems. It's also a good idea to know your test results and keep a list of the medicines you take. How can you care for yourself at home? · Take your medicines exactly as prescribed. Call your doctor if you think you are having a problem with your medicine. · Drink plenty of fluids to prevent dehydration. Choose water and other caffeine-free clear liquids until you feel better. Try sipping on sports drinks that have salt and sugar in them. · Eat a small snack, such as crackers, before you get out of bed. Wait a few minutes, then get out of bed slowly. · Keep food in your stomach, but not too much at once. An empty stomach can make nausea worse. Eat several small meals every day instead of three large meals. · Eat more protein and less fat. · Get plenty of vitamin B6 by eating whole grains, nuts, seeds, and legumes. You can take vitamin B6 tablets if your doctor says it is okay. · Try to avoid smells and foods that make you feel sick to your stomach. · Get lots of rest.  · You may want to try acupressure bands.  They put pressure on an acupressure point in the wrist. Some women feel better using the bands.  · Amina may also help you feel better. You can use it in tea, take it as a pill, or use a amina syrup that you can buy at a health food store. When should you call for help? Call 911 anytime you think you may need emergency care. For example, call if:  ? · You passed out (lost consciousness). ?Call your doctor now or seek immediate medical care if:  ? · You are sick to your stomach or cannot drink fluids. ? · You have symptoms of dehydration, such as:  ¨ Dry eyes and a dry mouth. ¨ Passing only a little urine. ¨ Feeling thirstier than usual.   ? · You are not able to keep down your medicines. ? · You have pain in your belly or pelvis. ? Watch closely for changes in your health, and be sure to contact your doctor if:  ? · You do not get better as expected. Where can you learn more? Go to http://marina-shana.info/. Enter B825 in the search box to learn more about \"Extreme Nausea and Vomiting in Pregnancy: Care Instructions. \"  Current as of: March 16, 2017  Content Version: 11.4  © 1383-2819 [employer]. Care instructions adapted under license by [employer] (which disclaims liability or warranty for this information). If you have questions about a medical condition or this instruction, always ask your healthcare professional. [address] any warranty or liability for your use of this information.

## 2017-10-28 NOTE — ED PROVIDER NOTES
HPI Comments: 34yoF [id number] currently 8-9 weeks pregnant with twins to ED c/o pelvic cramping, N/V for several days, but worse today. Pt states she was able to eat oatmeal this morning which stayed down. States she took odt zofran about 1 hour ago and started vomiting. Pt states she has tried phenergan suppositories, zofran odt, and reglan and states reglan works best. She is out of reglan and phenergan. Has not seen OB yet due to pending insurance. Denies fever, chills, vaginal discharge, back pain, dizziness, weakness or any other complaints. Patient is a 32 y.o. female presenting with nausea and pregnancy problem. The history is provided by the patient. Nausea    Associated symptoms include abdominal pain. Pertinent negatives include no chills and no fever. Pregnancy Problem   Associated symptoms include abdominal pain. Past Medical History:   Diagnosis Date    Miscarriage        Past Surgical History:   Procedure Laterality Date    HX DILATION AND CURETTAGE  2013         No family history on file. Social History     Social History    Marital status: SINGLE     Spouse name: N/A    Number of children: N/A    Years of education: N/A     Occupational History    Not on file. Social History Main Topics    Smoking status: Never Smoker    Smokeless tobacco: Never Used    Alcohol use No    Drug use: No    Sexual activity: Yes     Partners: Male     Birth control/ protection: Condom     Other Topics Concern    Not on file     Social History Narrative         ALLERGIES: Review of patient's allergies indicates no known allergies. Review of Systems   Constitutional: Negative for chills and fever. HENT: Negative. Gastrointestinal: Positive for abdominal pain, nausea and vomiting. Genitourinary: Positive for pelvic pain. Musculoskeletal: Negative. Skin: Negative. All other systems reviewed and are negative.       Vitals:    10/27/17 1947   BP: 108/72   Pulse: 80   Resp: 18 Temp: 99 °F (37.2 °C)   SpO2: 100%   Weight: 68 kg (150 lb)   Height: 5' 5\" (1.651 m)            Physical Exam   Constitutional: She is oriented to person, place, and time. She appears well-developed and well-nourished. No distress. HENT:   Head: Normocephalic and atraumatic. Right Ear: External ear normal.   Left Ear: External ear normal.   Mouth/Throat: Oropharynx is clear and moist.   Eyes: Conjunctivae and EOM are normal. Pupils are equal, round, and reactive to light. Neck: Normal range of motion. Neck supple. Cardiovascular: Normal rate and regular rhythm. Pulmonary/Chest: Effort normal and breath sounds normal. She has no wheezes. Abdominal: Soft. There is no tenderness. Musculoskeletal: Normal range of motion. She exhibits no edema. Lymphadenopathy:     She has no cervical adenopathy. Neurological: She is alert and oriented to person, place, and time. Skin: Skin is warm and dry. She is not diaphoretic. Psychiatric: She has a normal mood and affect. MDM  Number of Diagnoses or Management Options  9 weeks gestation of pregnancy:   Hyperemesis gravidarum:   Urinary tract infection in mother during first trimester of pregnancy:   Diagnosis management comments: Pt here with hyperemesis gravidarum, currently vomiting. Will check labs, UA, hydrate and control vomiting. Pt feeling better. Tolerated PO challenge. Will treat UTI with macrobid. US w/ single IUP at 9 weeks. Discussed with pt as she was told she had twin gestation last visit here. Strongly advised to f/u with OB next week. Pt voices understanding.  LUÍS Rodriguez 11:48 PM         Amount and/or Complexity of Data Reviewed  Clinical lab tests: reviewed and ordered  Tests in the radiology section of CPT®: reviewed and ordered  Review and summarize past medical records: yes  Discuss the patient with other providers: yes    Risk of Complications, Morbidity, and/or Mortality  Presenting problems: moderate  Diagnostic procedures: moderate  Management options: moderate    Patient Progress  Patient progress: improved    ED Course       Procedures

## 2017-10-30 ENCOUNTER — HOSPITAL ENCOUNTER (EMERGENCY)
Age: 27
Discharge: HOME | End: 2017-10-30
Attending: EMERGENCY MEDICINE
Payer: SELF-PAY

## 2017-10-30 VITALS
HEART RATE: 82 BPM | OXYGEN SATURATION: 99 % | RESPIRATION RATE: 18 BRPM | WEIGHT: 132.3 LBS | DIASTOLIC BLOOD PRESSURE: 80 MMHG | BODY MASS INDEX: 22.02 KG/M2 | TEMPERATURE: 98 F | SYSTOLIC BLOOD PRESSURE: 122 MMHG

## 2017-10-30 DIAGNOSIS — O21.0 HYPEREMESIS GRAVIDARUM: Primary | ICD-10-CM

## 2017-10-30 LAB
ALBUMIN SERPL-MCNC: 4.4 G/DL (ref 3.4–5)
ALBUMIN/GLOB SERPL: 1 {RATIO} (ref 0.8–1.7)
ALP SERPL-CCNC: 44 U/L (ref 45–117)
ALT SERPL-CCNC: 15 U/L (ref 13–56)
ANION GAP SERPL CALC-SCNC: 12 MMOL/L (ref 3–18)
APPEARANCE UR: ABNORMAL
AST SERPL-CCNC: 13 U/L (ref 15–37)
BACTERIA SPEC CULT: ABNORMAL
BACTERIA URNS QL MICRO: ABNORMAL /HPF
BASOPHILS # BLD: 0 K/UL (ref 0–0.1)
BASOPHILS NFR BLD: 0 % (ref 0–2)
BILIRUB SERPL-MCNC: 1.2 MG/DL (ref 0.2–1)
BILIRUB UR QL: NEGATIVE
BUN SERPL-MCNC: 13 MG/DL (ref 7–18)
BUN/CREAT SERPL: 18 (ref 12–20)
CALCIUM SERPL-MCNC: 10.1 MG/DL (ref 8.5–10.1)
CHLORIDE SERPL-SCNC: 100 MMOL/L (ref 100–108)
CO2 SERPL-SCNC: 23 MMOL/L (ref 21–32)
COLOR UR: ABNORMAL
CREAT SERPL-MCNC: 0.74 MG/DL (ref 0.6–1.3)
DIFFERENTIAL METHOD BLD: ABNORMAL
EOSINOPHIL # BLD: 0 K/UL (ref 0–0.4)
EOSINOPHIL NFR BLD: 0 % (ref 0–5)
EPITH CASTS URNS QL MICRO: ABNORMAL /LPF (ref 0–5)
ERYTHROCYTE [DISTWIDTH] IN BLOOD BY AUTOMATED COUNT: 13.2 % (ref 11.6–14.5)
GLOBULIN SER CALC-MCNC: 4.6 G/DL (ref 2–4)
GLUCOSE SERPL-MCNC: 109 MG/DL (ref 74–99)
GLUCOSE UR STRIP.AUTO-MCNC: NEGATIVE MG/DL
HCG SERPL-ACNC: ABNORMAL MIU/ML (ref 0–10)
HCT VFR BLD AUTO: 39.3 % (ref 35–45)
HGB BLD-MCNC: 13.9 G/DL (ref 12–16)
HGB UR QL STRIP: ABNORMAL
KETONES UR QL STRIP.AUTO: 80 MG/DL
LEUKOCYTE ESTERASE UR QL STRIP.AUTO: ABNORMAL
LIPASE SERPL-CCNC: 127 U/L (ref 73–393)
LYMPHOCYTES # BLD: 0.7 K/UL (ref 0.9–3.6)
LYMPHOCYTES NFR BLD: 12 % (ref 21–52)
MCH RBC QN AUTO: 30 PG (ref 24–34)
MCHC RBC AUTO-ENTMCNC: 35.4 G/DL (ref 31–37)
MCV RBC AUTO: 84.9 FL (ref 74–97)
MONOCYTES # BLD: 0.5 K/UL (ref 0.05–1.2)
MONOCYTES NFR BLD: 7 % (ref 3–10)
MUCOUS THREADS URNS QL MICRO: ABNORMAL /LPF
NEUTS SEG # BLD: 5.2 K/UL (ref 1.8–8)
NEUTS SEG NFR BLD: 81 % (ref 40–73)
NITRITE UR QL STRIP.AUTO: POSITIVE
PH UR STRIP: 5.5 [PH] (ref 5–8)
PLATELET # BLD AUTO: 245 K/UL (ref 135–420)
PMV BLD AUTO: 10.2 FL (ref 9.2–11.8)
POTASSIUM SERPL-SCNC: 3.4 MMOL/L (ref 3.5–5.5)
PROT SERPL-MCNC: 9 G/DL (ref 6.4–8.2)
PROT UR STRIP-MCNC: 30 MG/DL
RBC # BLD AUTO: 4.63 M/UL (ref 4.2–5.3)
RBC #/AREA URNS HPF: ABNORMAL /HPF (ref 0–5)
SERVICE CMNT-IMP: ABNORMAL
SODIUM SERPL-SCNC: 135 MMOL/L (ref 136–145)
SP GR UR REFRACTOMETRY: >1.03 (ref 1–1.03)
UROBILINOGEN UR QL STRIP.AUTO: 1 EU/DL (ref 0.2–1)
WBC # BLD AUTO: 6.4 K/UL (ref 4.6–13.2)
WBC URNS QL MICRO: ABNORMAL /HPF (ref 0–4)

## 2017-10-30 PROCEDURE — 83690 ASSAY OF LIPASE: CPT | Performed by: PHYSICIAN ASSISTANT

## 2017-10-30 PROCEDURE — 85025 COMPLETE CBC W/AUTO DIFF WBC: CPT | Performed by: PHYSICIAN ASSISTANT

## 2017-10-30 PROCEDURE — 74011250636 HC RX REV CODE- 250/636: Performed by: PHYSICIAN ASSISTANT

## 2017-10-30 PROCEDURE — 99284 EMERGENCY DEPT VISIT MOD MDM: CPT

## 2017-10-30 PROCEDURE — 81001 URINALYSIS AUTO W/SCOPE: CPT | Performed by: PHYSICIAN ASSISTANT

## 2017-10-30 PROCEDURE — 96376 TX/PRO/DX INJ SAME DRUG ADON: CPT

## 2017-10-30 PROCEDURE — 80053 COMPREHEN METABOLIC PANEL: CPT | Performed by: PHYSICIAN ASSISTANT

## 2017-10-30 PROCEDURE — 84702 CHORIONIC GONADOTROPIN TEST: CPT | Performed by: PHYSICIAN ASSISTANT

## 2017-10-30 PROCEDURE — 96361 HYDRATE IV INFUSION ADD-ON: CPT

## 2017-10-30 PROCEDURE — 96374 THER/PROPH/DIAG INJ IV PUSH: CPT

## 2017-10-30 RX ORDER — METOCLOPRAMIDE HYDROCHLORIDE 5 MG/ML
5 INJECTION INTRAMUSCULAR; INTRAVENOUS
Status: COMPLETED | OUTPATIENT
Start: 2017-10-30 | End: 2017-10-30

## 2017-10-30 RX ORDER — SODIUM CHLORIDE 9 MG/ML
1000 INJECTION, SOLUTION INTRAVENOUS ONCE
Status: COMPLETED | OUTPATIENT
Start: 2017-10-30 | End: 2017-10-30

## 2017-10-30 RX ADMIN — METOCLOPRAMIDE 5 MG: 5 INJECTION, SOLUTION INTRAMUSCULAR; INTRAVENOUS at 09:45

## 2017-10-30 RX ADMIN — SODIUM CHLORIDE 1000 ML: 9 INJECTION, SOLUTION INTRAVENOUS at 06:41

## 2017-10-30 RX ADMIN — METOCLOPRAMIDE 5 MG: 5 INJECTION, SOLUTION INTRAMUSCULAR; INTRAVENOUS at 06:48

## 2017-10-30 NOTE — ED NOTES
Bedside shift change report given to Jasmina Billings RN (oncoming nurse) by Lexie Robles RN (offgoing nurse). Report included the following information SBAR, Kardex, ED Summary, [facility] and Recent Results.

## 2017-10-30 NOTE — DISCHARGE INSTRUCTIONS
Extreme Nausea and Vomiting in Pregnancy: Care Instructions  Your Care Instructions    Nausea and vomiting (often called morning sickness) are common in pregnancy. They are caused by pregnancy hormones and happen most often in the first 3 months. Some women get very sick and are not able to keep down food and fluids. This extreme morning sickness is called hyperemesis gravidarum. It can lead to a dangerous loss of fluids in the body. It also can keep you from gaining weight and getting proper nutrition during your pregnancy. Your body fluids are put back in balance with water and minerals called electrolytes. Medicine may help if you have severe nausea and vomiting. Follow-up care is a key part of your treatment and safety. Be sure to make and go to all appointments, and call your doctor if you are having problems. It's also a good idea to know your test results and keep a list of the medicines you take. How can you care for yourself at home? · Take your medicines exactly as prescribed. Call your doctor if you think you are having a problem with your medicine. · Drink plenty of fluids to prevent dehydration. Choose water and other caffeine-free clear liquids until you feel better. Try sipping on sports drinks that have salt and sugar in them. · Eat a small snack, such as crackers, before you get out of bed. Wait a few minutes, then get out of bed slowly. · Keep food in your stomach, but not too much at once. An empty stomach can make nausea worse. Eat several small meals every day instead of three large meals. · Eat more protein and less fat. · Get plenty of vitamin B6 by eating whole grains, nuts, seeds, and legumes. You can take vitamin B6 tablets if your doctor says it is okay. · Try to avoid smells and foods that make you feel sick to your stomach. · Get lots of rest.  · You may want to try acupressure bands.  They put pressure on an acupressure point in the wrist. Some women feel better using the bands.  · Amina may also help you feel better. You can use it in tea, take it as a pill, or use a amian syrup that you can buy at a health food store. When should you call for help? Call 911 anytime you think you may need emergency care. For example, call if:  ? · You passed out (lost consciousness). ?Call your doctor now or seek immediate medical care if:  ? · You are sick to your stomach or cannot drink fluids. ? · You have symptoms of dehydration, such as:  ¨ Dry eyes and a dry mouth. ¨ Passing only a little urine. ¨ Feeling thirstier than usual.   ? · You are not able to keep down your medicines. ? · You have pain in your belly or pelvis. ? Watch closely for changes in your health, and be sure to contact your doctor if:  ? · You do not get better as expected. Where can you learn more? Go to http://marina-shana.info/. Enter C785 in the search box to learn more about \"Extreme Nausea and Vomiting in Pregnancy: Care Instructions. \"  Current as of: March 16, 2017  Content Version: 11.4  © 1767-6749 [employer]. Care instructions adapted under license by [employer] (which disclaims liability or warranty for this information). If you have questions about a medical condition or this instruction, always ask your healthcare professional. [address] any warranty or liability for your use of this information. [employer] Activation    Thank you for requesting access to [employer]. Please follow the instructions below to securely access and download your online medical record. [employer] allows you to send messages to your doctor, view your test results, renew your prescriptions, schedule appointments, and more. How Do I Sign Up? 1. In your internet browser, go to [email or web address]  2. Click on the First Time User? Click Here link in the Sign In box. You will be redirect to the New Member Sign Up page.   3. Enter your [employer] Access Code exactly as it appears below. You will not need to use this code after youve completed the sign-up process. If you do not sign up before the expiration date, you must request a new code. [employer] Access Code: [email or web address]  Expires: 2017  2:59 PM (This is the date your [employer] access code will )    4. Enter the last four digits of your Social Security Number (xxxx) and Date of Birth (mm/dd/yyyy) as indicated and click Submit. You will be taken to the next sign-up page. 5. Create a OKWave ID. This will be your [employer] login ID and cannot be changed, so think of one that is secure and easy to remember. 6. Create a [employer] password. You can change your password at any time. 7. Enter your Password Reset Question and Answer. This can be used at a later time if you forget your password. 8. Enter your e-mail address. You will receive e-mail notification when new information is available in [address]. 9. Click Sign Up. You can now view and download portions of your medical record. 10. Click the Download Summary menu link to download a portable copy of your medical information. Additional Information    If you have questions, please visit the Frequently Asked Questions section of the [employer] website at [email or web address] Remember, [employer] is NOT to be used for urgent needs. For medical emergencies, dial 911.

## 2017-10-30 NOTE — Clinical Note
Contiue all medications as prescribed. Follow-up with OBGYN in 1 week. Return to the ED immediately for any new or worsening symptoms.

## 2017-10-30 NOTE — ED TRIAGE NOTES
Pt presents to ED with complaint of nausea and vomiting with abdominal pain. PT states she is about 3 months pregnant.

## 2017-10-30 NOTE — ED PROVIDER NOTES
HPI Comments: 32 yr old female, , 1 miscarriage, estimated 3 months pregnant, presents to the ED complaining of nausea and vomiting over the past several days. Pt has been diagnosed with hyperemesis gravidarum and is currently taking reglan with little relief in sx. Pt states she has not yet seen an OBGYN for this pregnancy but reports she has seen Dr. Christiano Whalen in the past. Pt reports some abdominal pain when the vomiting becomes severe but otherwise denies abdominal pain and cramping. Pt denies vaginal discharge and bleeding. Review of the chart reveals the pt was here for the same complaints 3 days ago and was diagnosed with a UTI at that time. Pt was prescribed macrobid but states she cannot keep the medication down. Denies fever, chills, SOB, chest pain, and urinary sx. No other complaints at this time. Patient is a 32 y.o. female presenting with abdominal pain, vomiting, and nausea. Abdominal Pain    Associated symptoms include nausea and vomiting. Pertinent negatives include no fever, no diarrhea, no constipation, no dysuria, no frequency, no hematuria, no headaches, no myalgias, no chest pain and no back pain. Vomiting    Associated symptoms include abdominal pain. Pertinent negatives include no chills, no fever, no diarrhea, no headaches, no myalgias, no cough and no headaches. Nausea    Associated symptoms include abdominal pain. Pertinent negatives include no chills, no fever, no diarrhea, no headaches, no myalgias, no cough and no headaches. Past Medical History:   Diagnosis Date    Miscarriage        Past Surgical History:   Procedure Laterality Date    HX DILATION AND CURETTAGE           No family history on file. Social History     Social History    Marital status: SINGLE     Spouse name: N/A    Number of children: N/A    Years of education: N/A     Occupational History    Not on file.      Social History Main Topics    Smoking status: Never Smoker    Smokeless tobacco: Never Used    Alcohol use No    Drug use: No    Sexual activity: Yes     Partners: Male     Birth control/ protection: Condom     Other Topics Concern    Not on file     Social History Narrative         ALLERGIES: Review of patient's allergies indicates no known allergies. Review of Systems   Constitutional: Negative. Negative for chills, diaphoresis, fatigue and fever. HENT: Negative. Negative for congestion, ear pain, rhinorrhea and sore throat. Eyes: Negative. Negative for pain and redness. Respiratory: Negative. Negative for cough, shortness of breath, wheezing and stridor. Cardiovascular: Negative. Negative for chest pain, palpitations and leg swelling. Gastrointestinal: Positive for abdominal pain, nausea and vomiting. Negative for constipation and diarrhea. Endocrine: Negative. Genitourinary: Negative. Negative for dysuria, flank pain, frequency, hematuria, vaginal bleeding and vaginal discharge. Musculoskeletal: Negative. Negative for back pain, myalgias, neck pain and neck stiffness. Skin: Negative. Negative for rash and wound. Allergic/Immunologic: Negative. Neurological: Negative. Negative for dizziness, seizures, syncope, weakness and headaches. Hematological: Negative. Psychiatric/Behavioral: Negative. All other systems reviewed and are negative. Vitals:    10/30/17 0624   BP: 121/53   Pulse: 72   Resp: 20   Temp: 98.2 °F (36.8 °C)   SpO2: 100%   Weight: 60 kg (132 lb 4.8 oz)            Physical Exam   Constitutional: She is oriented to person, place, and time. She appears well-developed and well-nourished. She appears distressed. PT appears moderately distressed    HENT:   Head: Normocephalic. Neck: Normal range of motion. Neck supple. Cardiovascular: Normal rate, regular rhythm and normal heart sounds. Exam reveals no gallop and no friction rub. No murmur heard.   Pulmonary/Chest: Effort normal and breath sounds normal. No stridor. No respiratory distress. She has no wheezes. She has no rales. Abdominal: Soft. Bowel sounds are normal. She exhibits no distension and no mass. There is no tenderness. There is no rebound and no guarding. Palpation of the abdomen increases the pt's nausea but no tenderness is elicited on exam.    Musculoskeletal: Normal range of motion. Neurological: She is alert and oriented to person, place, and time. Coordination normal.   Gait is steady. Able to ambulate without difficulty. Skin: Skin is warm and dry. No rash noted. She is not diaphoretic. No erythema. Psychiatric: She has a normal mood and affect. Her behavior is normal. Thought content normal.   Nursing note and vitals reviewed. MDM  Number of Diagnoses or Management Options  Hyperemesis gravidarum:   Diagnosis management comments: Impression:  Hyperemesis gravidarum     IV inserted 1 L saline 5 mg reglan given   Review of US on 10/27/17 showed a live IUP measuring 9 weeks    UA: spec gravity > 1.030, 30 protein, 80 ketones, positive nitrates, mod leuk esterase, trace blood, grans 81, lymph 12, ABL 0.7, glucose 109, Tbil 1.2, SGOT 13, AP 44, TP 9, GLOB 4.6, Na 135, K 3.4, lipase unremarkable,   Total hcg [id number], hcg is still trending upwards    Progress: pt sx improving but she is still nauseated with give second dose of reglan 8:56 AM     12:11 PM Pt sx improving and she states she is ready to be discharged. Patient is stable for discharge at this time. No new rx given. Rest and follow-up with OBGYN this week. Return to the ED immediately for any new or worsening sx.   Brittany Woodson PA-C 12:12 PM        Amount and/or Complexity of Data Reviewed  Clinical lab tests: reviewed and ordered    Risk of Complications, Morbidity, and/or Mortality  Presenting problems: moderate  Diagnostic procedures: moderate  Management options: moderate    Patient Progress  Patient progress: stable    ED Course       Procedures

## 2017-10-30 NOTE — ED NOTES
Pt weak and unsteady on feet, but insisted to walk to restroom. Parents assisting with ambulation to provide urine sample.

## 2017-10-30 NOTE — ED NOTES
PT discharged per provider order, educated patient on discharge instructions and follow up care, verbalized understanding, ambulated out of ED

## 2017-10-30 NOTE — ED NOTES
Pt bedside report received from [facility]. Pt with mom at bedside. Pt is not vomiting at this time. Pt is eating ice chips at this time.

## 2017-11-15 ENCOUNTER — HOSPITAL ENCOUNTER (EMERGENCY)
Age: 27
Discharge: HOME | End: 2017-11-15
Attending: EMERGENCY MEDICINE
Payer: SELF-PAY

## 2017-11-15 VITALS
HEART RATE: 81 BPM | RESPIRATION RATE: 20 BRPM | TEMPERATURE: 98 F | OXYGEN SATURATION: 100 % | SYSTOLIC BLOOD PRESSURE: 100 MMHG | DIASTOLIC BLOOD PRESSURE: 50 MMHG

## 2017-11-15 DIAGNOSIS — O21.9 NAUSEA AND VOMITING IN PREGNANCY: Primary | ICD-10-CM

## 2017-11-15 PROCEDURE — 96374 THER/PROPH/DIAG INJ IV PUSH: CPT

## 2017-11-15 PROCEDURE — 99281 EMR DPT VST MAYX REQ PHY/QHP: CPT

## 2017-11-15 PROCEDURE — 99282 EMERGENCY DEPT VISIT SF MDM: CPT

## 2017-11-15 RX ORDER — PROMETHAZINE HYDROCHLORIDE 25 MG/ML
INJECTION, SOLUTION INTRAMUSCULAR; INTRAVENOUS
Status: DISCONTINUED
Start: 2017-11-15 | End: 2017-11-15 | Stop reason: HOSPADM

## 2017-11-15 RX ORDER — PROMETHAZINE HYDROCHLORIDE 25 MG/1
25 SUPPOSITORY RECTAL
Qty: 12 SUPPOSITORY | Refills: 0 | Status: SHIPPED | OUTPATIENT
Start: 2017-11-15 | End: 2017-11-22

## 2017-11-15 RX ORDER — SODIUM CHLORIDE 9 MG/ML
INJECTION, SOLUTION INTRAVENOUS
Status: DISCONTINUED
Start: 2017-11-15 | End: 2017-11-15 | Stop reason: HOSPADM

## 2017-11-15 NOTE — ED PROVIDER NOTES
HPI Comments: Pt 12 weeks pregnant presents with nausea and vomiting,  Pt has been seen multiple times and has had two ultrasounds to confirm IUP. Pt denies vaginal discharge or bleeding. No abd pain or cramping reported. Pt states she is not taking medications given to her because they do not work. Patient is a 32 y.o. female presenting with nausea. The history is provided by the patient. No  was used. Nausea    This is a recurrent problem. The current episode started 1 to 2 hours ago. The problem occurs 2 to 4 times per day. The problem has not changed since onset. The emesis has an appearance of stomach contents. There has been no fever. Pertinent negatives include no fever and no abdominal pain. Yes, the patient is pregnant. Past Medical History:   Diagnosis Date    Miscarriage        Past Surgical History:   Procedure Laterality Date    HX DILATION AND CURETTAGE  2013         No family history on file. Social History     Social History    Marital status: SINGLE     Spouse name: N/A    Number of children: N/A    Years of education: N/A     Occupational History    Not on file. Social History Main Topics    Smoking status: Never Smoker    Smokeless tobacco: Never Used    Alcohol use No    Drug use: No    Sexual activity: Yes     Partners: Male     Birth control/ protection: Condom     Other Topics Concern    Not on file     Social History Narrative         ALLERGIES: Review of patient's allergies indicates no known allergies. Review of Systems   Constitutional: Negative for fever. Gastrointestinal: Positive for nausea and vomiting. Negative for abdominal pain. Genitourinary: Negative for vaginal bleeding, vaginal discharge and vaginal pain. Neurological: Negative for dizziness and weakness. All other systems reviewed and are negative. There were no vitals filed for this visit.          Physical Exam   Constitutional: She is oriented to person, place, and time. She appears well-developed and well-nourished. HENT:   Head: Normocephalic and atraumatic. Eyes: Conjunctivae and EOM are normal. Pupils are equal, round, and reactive to light. Neck: Normal range of motion. Neck supple. Cardiovascular: Normal rate and regular rhythm. Pulmonary/Chest: Effort normal and breath sounds normal.   Abdominal: Soft. Bowel sounds are normal.   Musculoskeletal: Normal range of motion. Neurological: She is alert and oriented to person, place, and time. She has normal reflexes. Skin: Skin is warm and dry. Psychiatric: She has a normal mood and affect. Her behavior is normal. Judgment and thought content normal.   Nursing note and vitals reviewed. MDM  Number of Diagnoses or Management Options  Nausea and vomiting in pregnancy:   Diagnosis management comments: Pt given iv bolus and phenergan iv, she has had no vomiting in ed and will be discharged with phenergan supps again. She will f/u with ob md.  No indication for ultrasound as she has had two to confirm IUP and no vaginal bleeding or discharge was reported    T 971    Risk of Complications, Morbidity, and/or Mortality  Presenting problems: low  Management options: low    Patient Progress  Patient progress: improved    ED Course       Procedures            Vitals:   see paper chart for down time      Medications ordered:    see paper chart for down time         Reevaluation of patient:   I have reassessed the patient. Patient is feeling better and is asking to go home    Disposition:    Diagnosis:   1.  Nausea and vomiting in pregnancy        Disposition: to Home      Follow-up Information     Follow up With Details Comments Tiffani Bonner MD In 2 days  Cornelio Kramer  [phone]             Patient's Medications   Start Taking    PROMETHAZINE (PHENERGAN) 25 MG SUPPOSITORY    Insert 1 Suppository into rectum every six (6) hours as needed for Nausea for up to 7 days. Continue Taking    METOCLOPRAMIDE HCL (REGLAN) 10 MG TABLET    Take 1 Tab by mouth every six (6) hours as needed for Nausea. ONDANSETRON (ZOFRAN ODT) 4 MG DISINTEGRATING TABLET    Take 1 Tab by mouth every eight (8) hours as needed for Nausea. These Medications have changed    No medications on file   Stop Taking    PROMETHAZINE (PHENERGAN) 25 MG TABLET    Take 1 Tab by mouth every six (6) hours as needed for Nausea. Caution: This medication may make you drowsy. Avoid driving while under the influence of this medication. Return to the ER if you are unable to obtain referral as directed. Ebony Bassett's  results have been reviewed with her. She has been counseled regarding her diagnosis, treatment, and plan. She verbally conveys understanding and agreement of the signs, symptoms, diagnosis, treatment and prognosis and additionally agrees to follow up as discussed. She also agrees with the care-plan and conveys that all of her questions have been answered. I have also provided discharge instructions for her that include: educational information regarding their diagnosis and treatment, and list of reasons why they would want to return to the ED prior to their follow-up appointment, should her condition change.       Tabatha SEVERINO-EVARISTO

## 2017-11-15 NOTE — DISCHARGE INSTRUCTIONS
Managing Morning Sickness: Care Instructions  Your Care Instructions    For many women, the toughest part of early pregnancy is morning sickness. Morning sickness can range from mild nausea to severe nausea with bouts of vomiting. Symptoms may be worse in the morning, although they can strike at any time of the day or night. If you have nausea, vomiting, or both, look for safe measures that can bring you relief. You can take simple steps at home to manage morning sickness. These steps include changing what and when you eat and avoiding certain foods and smells. Some women find that acupuncture and acupressure wristbands also help. Follow-up care is a key part of your treatment and safety. Be sure to make and go to all appointments, and call your doctor if you are having problems. It's also a good idea to know your test results and keep a list of the medicines you take. How can you care for yourself at home? · Keep food in your stomach, but not too much at once. Your nausea may be worse if your stomach is empty. Eat five or six small meals a day instead of three large meals. · For morning nausea, eat a small snack, such as a couple of crackers or dry biscuits, before rising. Allow a few minutes for your stomach to settle before you get out of bed slowly. · Drink plenty of fluids, enough so that your urine is light yellow or clear like water. If you have kidney, heart, or liver disease and have to limit fluids, talk with your doctor before you increase the amount of fluids you drink. Some women find that peppermint tea helps with nausea. · Eat more protein, such as chicken, fish, lean meat, beans, nuts, and seeds. · Eat carbohydrate foods, such as potatoes, whole-grain cereals, rice, and pasta. · Avoid smells and foods that make you feel nauseated. Spicy or high-fat foods, citrus juice, milk, coffee, and tea with caffeine often make nausea worse. · Do not drink alcohol. · Do not smoke.  Try not to be around others who smoke. If you need help quitting, talk to your doctor about stop-smoking programs and medicines. These can increase your chances of quitting for good. · If you are taking iron supplements, ask your doctor if they are necessary. Iron can make nausea worse. · Get lots of rest. Stress and fatigue can make your morning sickness worse. · Ask your doctor about taking prescription medicine, or over-the-counter products such as vitamin B6, doxylamine, or aleksandar, to relieve your symptoms. Your doctor can tell you the doses that are safe for you. · Take your prenatal vitamins at night on a full stomach. When should you call for help? Call 911 anytime you think you may need emergency care. For example, call if:  ? · You passed out (lost consciousness). ?Call your doctor now or seek immediate medical care if:  ? · You are sick to your stomach or cannot drink fluids. ? · You have symptoms of dehydration, such as:  ¨ Dry eyes and a dry mouth. ¨ Passing only a little urine. ¨ Feeling thirstier than usual.   ? · You are not able to keep down your medicine. ? · You have pain in your belly or pelvis. ? Watch closely for changes in your health, and be sure to contact your doctor if:  ? · You do not get better as expected. Where can you learn more? Go to http://marina-shana.info/. Enter N335 in the search box to learn more about \"Managing Morning Sickness: Care Instructions. \"  Current as of: March 16, 2017  Content Version: 11.4  © 5474-1088 [employer]. Care instructions adapted under license by [employer] (which disclaims liability or warranty for this information). If you have questions about a medical condition or this instruction, always ask your healthcare professional. [place] [address] any warranty or liability for your use of this information.

## 2017-11-17 ENCOUNTER — HOSPITAL ENCOUNTER (EMERGENCY)
Age: 27
Discharge: HOME | End: 2017-11-17
Attending: EMERGENCY MEDICINE
Payer: SELF-PAY

## 2017-11-17 VITALS
OXYGEN SATURATION: 100 % | TEMPERATURE: 97.4 F | DIASTOLIC BLOOD PRESSURE: 60 MMHG | SYSTOLIC BLOOD PRESSURE: 102 MMHG | RESPIRATION RATE: 20 BRPM | HEART RATE: 76 BPM

## 2017-11-17 DIAGNOSIS — N39.0 ACUTE UTI: ICD-10-CM

## 2017-11-17 DIAGNOSIS — O21.0 HYPEREMESIS GRAVIDARUM: Primary | ICD-10-CM

## 2017-11-17 DIAGNOSIS — E87.6 HYPOKALEMIA: ICD-10-CM

## 2017-11-17 LAB
ANION GAP SERPL CALC-SCNC: 14 MMOL/L (ref 3–18)
BASOPHILS # BLD: 0 K/UL (ref 0–0.1)
BASOPHILS NFR BLD: 0 % (ref 0–2)
BUN SERPL-MCNC: 13 MG/DL (ref 7–18)
BUN/CREAT SERPL: 20 (ref 12–20)
CALCIUM SERPL-MCNC: 9.4 MG/DL (ref 8.5–10.1)
CHLORIDE SERPL-SCNC: 98 MMOL/L (ref 100–108)
CO2 SERPL-SCNC: 23 MMOL/L (ref 21–32)
CREAT SERPL-MCNC: 0.66 MG/DL (ref 0.6–1.3)
DIFFERENTIAL METHOD BLD: ABNORMAL
EOSINOPHIL # BLD: 0 K/UL (ref 0–0.4)
EOSINOPHIL NFR BLD: 0 % (ref 0–5)
ERYTHROCYTE [DISTWIDTH] IN BLOOD BY AUTOMATED COUNT: 12.9 % (ref 11.6–14.5)
GLUCOSE SERPL-MCNC: 97 MG/DL (ref 74–99)
HCT VFR BLD AUTO: 38.7 % (ref 35–45)
HGB BLD-MCNC: 14 G/DL (ref 12–16)
LYMPHOCYTES # BLD: 0.9 K/UL (ref 0.9–3.6)
LYMPHOCYTES NFR BLD: 14 % (ref 21–52)
MAGNESIUM SERPL-MCNC: 2.1 MG/DL (ref 1.6–2.6)
MCH RBC QN AUTO: 30.6 PG (ref 24–34)
MCHC RBC AUTO-ENTMCNC: 36.2 G/DL (ref 31–37)
MCV RBC AUTO: 84.5 FL (ref 74–97)
MONOCYTES # BLD: 0.5 K/UL (ref 0.05–1.2)
MONOCYTES NFR BLD: 8 % (ref 3–10)
NEUTS SEG # BLD: 5.1 K/UL (ref 1.8–8)
NEUTS SEG NFR BLD: 78 % (ref 40–73)
PLATELET # BLD AUTO: 256 K/UL (ref 135–420)
PMV BLD AUTO: 9.9 FL (ref 9.2–11.8)
POTASSIUM SERPL-SCNC: 2.7 MMOL/L (ref 3.5–5.5)
RBC # BLD AUTO: 4.58 M/UL (ref 4.2–5.3)
SODIUM SERPL-SCNC: 135 MMOL/L (ref 136–145)
WBC # BLD AUTO: 6.5 K/UL (ref 4.6–13.2)

## 2017-11-17 PROCEDURE — 99283 EMERGENCY DEPT VISIT LOW MDM: CPT

## 2017-11-17 PROCEDURE — 81001 URINALYSIS AUTO W/SCOPE: CPT | Performed by: EMERGENCY MEDICINE

## 2017-11-17 PROCEDURE — 96375 TX/PRO/DX INJ NEW DRUG ADDON: CPT

## 2017-11-17 PROCEDURE — 80048 BASIC METABOLIC PNL TOTAL CA: CPT | Performed by: EMERGENCY MEDICINE

## 2017-11-17 PROCEDURE — 96365 THER/PROPH/DIAG IV INF INIT: CPT

## 2017-11-17 PROCEDURE — 96361 HYDRATE IV INFUSION ADD-ON: CPT

## 2017-11-17 PROCEDURE — 74011250637 HC RX REV CODE- 250/637: Performed by: EMERGENCY MEDICINE

## 2017-11-17 PROCEDURE — 74011250636 HC RX REV CODE- 250/636: Performed by: EMERGENCY MEDICINE

## 2017-11-17 PROCEDURE — 85025 COMPLETE CBC W/AUTO DIFF WBC: CPT | Performed by: EMERGENCY MEDICINE

## 2017-11-17 PROCEDURE — 83735 ASSAY OF MAGNESIUM: CPT | Performed by: EMERGENCY MEDICINE

## 2017-11-17 RX ORDER — METOCLOPRAMIDE 10 MG/1
10 TABLET ORAL
Qty: 20 TAB | Refills: 0 | Status: SHIPPED | OUTPATIENT
Start: 2017-11-17 | End: 2020-05-06

## 2017-11-17 RX ORDER — DIPHENHYDRAMINE HYDROCHLORIDE 50 MG/ML
12.5 INJECTION, SOLUTION INTRAMUSCULAR; INTRAVENOUS ONCE
Status: COMPLETED | OUTPATIENT
Start: 2017-11-17 | End: 2017-11-17

## 2017-11-17 RX ORDER — NITROFURANTOIN 25; 75 MG/1; MG/1
100 CAPSULE ORAL 2 TIMES DAILY
Qty: 6 CAP | Refills: 0 | Status: SHIPPED | OUTPATIENT
Start: 2017-11-17 | End: 2017-11-20

## 2017-11-17 RX ORDER — POTASSIUM CHLORIDE 20 MEQ/1
40 TABLET, EXTENDED RELEASE ORAL
Status: COMPLETED | OUTPATIENT
Start: 2017-11-17 | End: 2017-11-17

## 2017-11-17 RX ORDER — METOCLOPRAMIDE HYDROCHLORIDE 5 MG/ML
10 INJECTION INTRAMUSCULAR; INTRAVENOUS
Status: COMPLETED | OUTPATIENT
Start: 2017-11-17 | End: 2017-11-17

## 2017-11-17 RX ORDER — POTASSIUM CHLORIDE 7.45 MG/ML
10 INJECTION INTRAVENOUS
Status: DISPENSED | OUTPATIENT
Start: 2017-11-17 | End: 2017-11-17

## 2017-11-17 RX ORDER — POTASSIUM CHLORIDE 20 MEQ/1
20 TABLET, EXTENDED RELEASE ORAL 3 TIMES DAILY
Qty: 9 TAB | Refills: 0 | Status: SHIPPED | OUTPATIENT
Start: 2017-11-17 | End: 2017-11-20

## 2017-11-17 RX ADMIN — DIPHENHYDRAMINE HYDROCHLORIDE 12.5 MG: 50 INJECTION, SOLUTION INTRAMUSCULAR; INTRAVENOUS at 11:51

## 2017-11-17 RX ADMIN — POTASSIUM CHLORIDE 10 MEQ: 10 INJECTION, SOLUTION INTRAVENOUS at 13:17

## 2017-11-17 RX ADMIN — SODIUM CHLORIDE 1000 ML: 900 INJECTION, SOLUTION INTRAVENOUS at 10:43

## 2017-11-17 RX ADMIN — POTASSIUM CHLORIDE 40 MEQ: 20 TABLET, EXTENDED RELEASE ORAL at 12:22

## 2017-11-17 RX ADMIN — METOCLOPRAMIDE 10 MG: 5 INJECTION, SOLUTION INTRAMUSCULAR; INTRAVENOUS at 10:30

## 2017-11-17 NOTE — DISCHARGE INSTRUCTIONS
Urinary Tract Infection in Pregnancy: Care Instructions  Your Care Instructions    A urinary tract infection, or UTI, is an infection of the bladder and other urinary structures. Most UTIs occur in the bladder. They often cause pain or burning when you urinate. UTI is the most common bacterial infection in pregnancy. If untreated, a UTI could lead to problems such as a kidney infection or  labor. Most UTIs can be cured with antibiotics. Your doctor will prescribe an antibiotic that is safe during pregnancy. Be sure to finish your medicine so that the infection does not spread to your kidneys. Follow-up care is a key part of your treatment and safety. Be sure to make and go to all appointments, and call your doctor if you are having problems. It's also a good idea to know your test results and keep a list of the medicines you take. How can you care for yourself at home? · Take your antibiotics as directed. Do not stop taking them just because you feel better. You need to take the full course of antibiotics. · Drink extra water and other fluids for the next day or two. This will help wash out the bacteria causing the infection. If you have kidney, heart, or liver disease and have to limit fluids, talk with your doctor before you increase the amount of fluids you drink. · Do not drink alcohol. · Urinate often. Try to empty your bladder each time. Preventing UTIs  · Drink plenty of fluids, enough so that your urine is light yellow or clear like water. This helps you urinate often, which clears bacteria from your system. If you have kidney, heart, or liver disease and have to limit fluids, talk with your doctor before you increase the amount of fluids you drink. · Urinate when you first have the urge. · Urinate right after you have sex. This is the best way for women to avoid UTIs. · When going to the bathroom, wipe from front to back to keep bacteria from entering the vagina or urethra.   When should you call for help? Call your doctor now or seek immediate medical care if:  ? · You have symptoms of a worse urinary tract infection. These may include:  ¨ Pain or burning when you urinate. ¨ A frequent need to urinate without being able to pass much urine. ¨ Pain in the flank, which is just below the rib cage and above the waist on either side of the back. ¨ Blood in your urine. ¨ A fever. ? Watch closely for changes in your health, and be sure to contact your doctor if:  ? · You do not get better as expected. Where can you learn more? Go to http://marina-shana.info/. Enter M982 in the search box to learn more about \"Urinary Tract Infection in Pregnancy: Care Instructions. \"  Current as of: March 16, 2017  Content Version: 11.4  © 7209-8416 [employer]. Care instructions adapted under license by [employer] (which disclaims liability or warranty for this information). If you have questions about a medical condition or this instruction, always ask your healthcare professional. [place] [address] any warranty or liability for your use of this information. Electrolyte Imbalance: Care Instructions  Your Care Instructions  Electrolytes are minerals in your blood. They include sodium, potassium, calcium, and magnesium. When they are not at the right levels, you can feel very ill. You may not know what is causing it, but you know something is wrong. You may feel weak or numb, have muscle spasms, or twitch. Your heart may beat fast. Symptoms are different with each mineral. Too much is as bad as too little. Minerals help keep your body working as it should. Vomiting, diarrhea, and fever can cause you to lose minerals. A problem with your kidneys can tip a mineral out of balance. So can taking certain medicines. Your doctor may do more tests. He or she may change your medicine and diet.  If you are low in one or more minerals, they may be given through a tube into your vein (IV). Your doctor may have you take or drink special fluids or pills. If your kidneys are failing, your blood may be filtered. This is called dialysis. It can put the minerals back in balance. Follow-up care is a key part of your treatment and safety. Be sure to make and go to all appointments, and call your doctor if you are having problems. It's also a good idea to know your test results and keep a list of the medicines you take. How can you care for yourself at home? · Take your medicines exactly as prescribed. Call your doctor if you have any problems with your medicines. You will get more details on the specific medicines your doctor prescribes. · Do not take any medicine without talking to your doctor first. This includes prescription, over-the-counter, and herbal medicines. · If you have kidney, heart, or liver disease and have to limit fluids, talk with your doctor before you increase the amount of fluids you drink. · Your doctor or dietitian may give you a diet plan to help balance your minerals. Follow the diet carefully. When should you call for help? Call 911 anytime you think you may need emergency care. For example, call if:  ? · You passed out (lost consciousness). ? · Your heartbeat seems to be irregular. It might be speeding up and then slowing down or skipping beats. ?Call your doctor now or seek immediate medical care if:  ? · You have muscle aches. ? · You feel very weak. ? · You are confused or cannot think clearly. ? Watch closely for changes in your health, and be sure to contact your doctor if:  ? · You do not get better as expected. Where can you learn more? Go to http://marina-shana.info/. Enter E448 in the search box to learn more about \"Electrolyte Imbalance: Care Instructions. \"  Current as of: May 12, 2017  Content Version: 11.4  © 3634-4563 [employer].  Care instructions adapted under license by [employer] (which disclaims liability or warranty for this information). If you have questions about a medical condition or this instruction, always ask your healthcare professional. [address] any warranty or liability for your use of this information. Extreme Nausea and Vomiting in Pregnancy: Care Instructions  Your Care Instructions    Nausea and vomiting (often called morning sickness) are common in pregnancy. They are caused by pregnancy hormones and happen most often in the first 3 months. Some women get very sick and are not able to keep down food and fluids. This extreme morning sickness is called hyperemesis gravidarum. It can lead to a dangerous loss of fluids in the body. It also can keep you from gaining weight and getting proper nutrition during your pregnancy. Your body fluids are put back in balance with water and minerals called electrolytes. Medicine may help if you have severe nausea and vomiting. Follow-up care is a key part of your treatment and safety. Be sure to make and go to all appointments, and call your doctor if you are having problems. It's also a good idea to know your test results and keep a list of the medicines you take. How can you care for yourself at home? · Take your medicines exactly as prescribed. Call your doctor if you think you are having a problem with your medicine. · Drink plenty of fluids to prevent dehydration. Choose water and other caffeine-free clear liquids until you feel better. Try sipping on sports drinks that have salt and sugar in them. · Eat a small snack, such as crackers, before you get out of bed. Wait a few minutes, then get out of bed slowly. · Keep food in your stomach, but not too much at once. An empty stomach can make nausea worse. Eat several small meals every day instead of three large meals. · Eat more protein and less fat.   · Get plenty of vitamin B6 by eating whole grains, nuts, seeds, and legumes. You can take vitamin B6 tablets if your doctor says it is okay. · Try to avoid smells and foods that make you feel sick to your stomach. · Get lots of rest.  · You may want to try acupressure bands. They put pressure on an acupressure point in the wrist. Some women feel better using the bands. · Amina may also help you feel better. You can use it in tea, take it as a pill, or use a amina syrup that you can buy at a health food store. When should you call for help? Call 911 anytime you think you may need emergency care. For example, call if:  ? · You passed out (lost consciousness). ?Call your doctor now or seek immediate medical care if:  ? · You are sick to your stomach or cannot drink fluids. ? · You have symptoms of dehydration, such as:  ¨ Dry eyes and a dry mouth. ¨ Passing only a little urine. ¨ Feeling thirstier than usual.   ? · You are not able to keep down your medicines. ? · You have pain in your belly or pelvis. ? Watch closely for changes in your health, and be sure to contact your doctor if:  ? · You do not get better as expected. Where can you learn more? Go to http://marina-shana.info/. Enter O854 in the search box to learn more about \"Extreme Nausea and Vomiting in Pregnancy: Care Instructions. \"  Current as of: March 16, 2017  Content Version: 11.4  © 0645-3566 [employer]. Care instructions adapted under license by [employer] (which disclaims liability or warranty for this information). If you have questions about a medical condition or this instruction, always ask your healthcare professional. [place] [address] any warranty or liability for your use of this information.

## 2017-11-17 NOTE — ED PROVIDER NOTES
HPI Comments: [id number] with 9week IUP on US 10/27/17 is c/o persistent vomiting x 2d. Not tolerating her UTI ABX b/c of vomiting. On Phenergan suppositories, has tried oral Zofran, Reglan, but nothing working. Denies vaginal bleeding, pelvic pain. Denies fever, dysuria, flank pain. Patient is a 32 y.o. female presenting with pregnancy problem and vomiting. The history is provided by the patient. Pregnancy Problem   This is a recurrent problem. The current episode started 2 days ago. The problem occurs constantly. The problem has been gradually worsening. Pertinent negatives include no chest pain, no abdominal pain, no headaches and no shortness of breath. Exacerbated by: eating attempts. She has tried food (anti-emetics) for the symptoms. The treatment provided no relief. Vomiting    Pertinent negatives include no abdominal pain, no diarrhea, no headaches and no headaches. Past Medical History:   Diagnosis Date    Miscarriage        Past Surgical History:   Procedure Laterality Date    HX DILATION AND CURETTAGE           History reviewed. No pertinent family history. Social History     Social History    Marital status: SINGLE     Spouse name: N/A    Number of children: N/A    Years of education: N/A     Occupational History    Not on file. Social History Main Topics    Smoking status: Never Smoker    Smokeless tobacco: Never Used    Alcohol use No    Drug use: No    Sexual activity: Yes     Partners: Male     Birth control/ protection: Condom     Other Topics Concern    Not on file     Social History Narrative         ALLERGIES: Review of patient's allergies indicates no known allergies. Review of Systems   Constitutional: Negative. HENT: Negative. Eyes: Negative. Respiratory: Negative. Negative for shortness of breath. Cardiovascular: Negative. Negative for chest pain. Gastrointestinal: Positive for nausea and vomiting.  Negative for abdominal pain, constipation and diarrhea. Endocrine: Negative. Genitourinary: Negative. Negative for dysuria, flank pain, pelvic pain, vaginal bleeding and vaginal discharge. Musculoskeletal: Negative. Skin: Negative. Allergic/Immunologic: Negative. Neurological: Negative. Negative for headaches. Hematological: Negative. Psychiatric/Behavioral: Negative. All other systems reviewed and are negative. Vitals:    11/17/17 1009   BP: 102/60   Pulse: 76   Resp: 20   Temp: 97.4 °F (36.3 °C)   SpO2: 100%            Physical Exam   Constitutional: She is oriented to person, place, and time. She appears well-developed. She appears distressed. Actively vomiting in the exam room   HENT:   Head: Normocephalic and atraumatic. Oral mucosa very dry appearing   Eyes: Pupils are equal, round, and reactive to light. Neck: No JVD present. No tracheal deviation present. No thyromegaly present. Cardiovascular: Normal rate, regular rhythm and normal heart sounds. Exam reveals no gallop and no friction rub. No murmur heard. Pulmonary/Chest: Effort normal and breath sounds normal. No stridor. No respiratory distress. She has no wheezes. She has no rales. She exhibits no tenderness. Abdominal: Soft. She exhibits no distension and no mass. There is no tenderness. There is no rebound and no guarding. Musculoskeletal: She exhibits no edema or tenderness. Lymphadenopathy:     She has no cervical adenopathy. Neurological: She is alert and oriented to person, place, and time. Skin: Skin is warm and dry. No rash noted. No erythema. No pallor. Psychiatric: She has a normal mood and affect. Her behavior is normal. Thought content normal.   Nursing note and vitals reviewed.        MDM  Number of Diagnoses or Management Options  Acute UTI:   Hyperemesis gravidarum:   Hypokalemia:   Diagnosis management comments: Differential: UTI; dehydration; electrolyte abnormalities; hyperemesis gravidarum    Successfully tolerated PO challenge, requesting more apple juice. Urine cultured. Rx for Macrobid and Reglan. D/c home. F/up with OB. Amount and/or Complexity of Data Reviewed  Clinical lab tests: ordered and reviewed      ED Course       Procedures      Recent Results (from the past 12 hour(s))   CBC WITH AUTOMATED DIFF    Collection Time: 11/17/17 10:20 AM   Result Value Ref Range    WBC 6.5 4.6 - 13.2 K/uL    RBC 4.58 4.20 - 5.30 M/uL    HGB 14.0 12.0 - 16.0 g/dL    HCT 38.7 35.0 - 45.0 %    MCV 84.5 74.0 - 97.0 FL    MCH 30.6 24.0 - 34.0 PG    MCHC 36.2 31.0 - 37.0 g/dL    RDW 12.9 11.6 - 14.5 %    PLATELET 458 749 - 024 K/uL    MPV 9.9 9.2 - 11.8 FL    NEUTROPHILS 78 (H) 40 - 73 %    LYMPHOCYTES 14 (L) 21 - 52 %    MONOCYTES 8 3 - 10 %    EOSINOPHILS 0 0 - 5 %    BASOPHILS 0 0 - 2 %    ABS. NEUTROPHILS 5.1 1.8 - 8.0 K/UL    ABS. LYMPHOCYTES 0.9 0.9 - 3.6 K/UL    ABS. MONOCYTES 0.5 0.05 - 1.2 K/UL    ABS. EOSINOPHILS 0.0 0.0 - 0.4 K/UL    ABS. BASOPHILS 0.0 0.0 - 0.1 K/UL    DF AUTOMATED     METABOLIC PANEL, BASIC    Collection Time: 11/17/17 10:20 AM   Result Value Ref Range    Sodium 135 (L) 136 - 145 mmol/L    Potassium 2.7 (LL) 3.5 - 5.5 mmol/L    Chloride 98 (L) 100 - 108 mmol/L    CO2 23 21 - 32 mmol/L    Anion gap 14 3.0 - 18 mmol/L    Glucose 97 74 - 99 mg/dL    BUN 13 7.0 - 18 MG/DL    Creatinine 0.66 0.6 - 1.3 MG/DL    BUN/Creatinine ratio 20 12 - 20      GFR est AA >60 >60 ml/min/1.73m2    GFR est non-AA >60 >60 ml/min/1.73m2    Calcium 9.4 8.5 - 10.1 MG/DL   MAGNESIUM    Collection Time: 11/17/17 10:20 AM   Result Value Ref Range    Magnesium 2.1 1.6 - 2.6 mg/dL     3:13 PM  Diagnosis:   1. Hyperemesis gravidarum    2. Hypokalemia    3.  Acute UTI          Disposition: home    Follow-up Information     Follow up With Details Comments Tiffani Bonner MD Schedule an appointment as soon as possible for a visit in 3 days  [facility],[address] [facility] EM[facility]GENCY [facility]  If symptoms worsen return immediately [facility]          Patient's Medications   Start Taking    NITROFURANTOIN, MACROCRYSTAL-MONOHYDRATE, (MACROBID) 100 MG CAPSULE    Take 1 Cap by mouth two (2) times a day for 3 days. POTASSIUM CHLORIDE (K-DUR, KLOR-CON) 20 MEQ TABLET    Take 1 Tab by mouth three (3) times daily for 3 days. Continue Taking    ONDANSETRON (ZOFRAN ODT) 4 MG DISINTEGRATING TABLET    Take 1 Tab by mouth every eight (8) hours as needed for Nausea. PROMETHAZINE (PHENERGAN) 25 MG SUPPOSITORY    Insert 1 Suppository into rectum every six (6) hours as needed for Nausea for up to 7 days. These Medications have changed    Modified Medication Previous Medication    METOCLOPRAMIDE HCL (REGLAN) 10 MG TABLET metoclopramide HCl (REGLAN) 10 mg tablet       Take 1 Tab by mouth every six (6) hours as needed for Nausea. Take 1 Tab by mouth every six (6) hours as needed for Nausea.    Stop Taking    No medications on file

## 2017-11-17 NOTE — ED TRIAGE NOTES
C/o vomiting X 3 days. Reports currently approx 4 mo pregnant. States diaganosed with UTI 11/15/17 here in ED but has been unable to keep medications down.  Pt actively gagging in triage

## 2017-11-20 LAB
APPEARANCE UR: ABNORMAL
BACTERIA URNS QL MICRO: ABNORMAL /HPF
BILIRUB UR QL: NEGATIVE
COLOR UR: YELLOW
EPITH CASTS URNS QL MICRO: ABNORMAL /LPF (ref 0–5)
GLUCOSE UR STRIP.AUTO-MCNC: NEGATIVE MG/DL
HGB UR QL STRIP: NEGATIVE
KETONES UR QL STRIP.AUTO: 80 MG/DL
LEUKOCYTE ESTERASE UR QL STRIP.AUTO: ABNORMAL
MUCOUS THREADS URNS QL MICRO: ABNORMAL /LPF
NITRITE UR QL STRIP.AUTO: POSITIVE
PH UR STRIP: 6 [PH] (ref 5–8)
PROT UR STRIP-MCNC: 30 MG/DL
RBC #/AREA URNS HPF: ABNORMAL /HPF (ref 0–5)
SP GR UR REFRACTOMETRY: >1.03 (ref 1–1.03)
UROBILINOGEN UR QL STRIP.AUTO: 1 EU/DL (ref 0.2–1)
WBC URNS QL MICRO: ABNORMAL /HPF (ref 0–4)

## 2017-11-27 ENCOUNTER — HOSPITAL ENCOUNTER (EMERGENCY)
Age: 27
Discharge: HOME | End: 2017-11-27
Attending: EMERGENCY MEDICINE
Payer: SELF-PAY

## 2017-11-27 VITALS
OXYGEN SATURATION: 99 % | HEART RATE: 91 BPM | DIASTOLIC BLOOD PRESSURE: 68 MMHG | TEMPERATURE: 98.4 F | RESPIRATION RATE: 17 BRPM | SYSTOLIC BLOOD PRESSURE: 111 MMHG

## 2017-11-27 DIAGNOSIS — Z3A.14 14 WEEKS GESTATION OF PREGNANCY: Primary | ICD-10-CM

## 2017-11-27 LAB
AMORPH CRY URNS QL MICRO: ABNORMAL
APPEARANCE UR: CLEAR
BILIRUB UR QL: NEGATIVE
COLOR UR: YELLOW
EPITH CASTS URNS QL MICRO: ABNORMAL /LPF (ref 0–5)
GLUCOSE UR STRIP.AUTO-MCNC: NEGATIVE MG/DL
HCG UR QL: POSITIVE
HGB UR QL STRIP: NEGATIVE
KETONES UR QL STRIP.AUTO: NEGATIVE MG/DL
LEUKOCYTE ESTERASE UR QL STRIP.AUTO: ABNORMAL
MUCOUS THREADS URNS QL MICRO: ABNORMAL /LPF
NITRITE UR QL STRIP.AUTO: NEGATIVE
PH UR STRIP: 7 [PH] (ref 5–8)
PROT UR STRIP-MCNC: NEGATIVE MG/DL
SP GR UR REFRACTOMETRY: 1.02 (ref 1–1.03)
UROBILINOGEN UR QL STRIP.AUTO: 1 EU/DL (ref 0.2–1)
WBC URNS QL MICRO: ABNORMAL /HPF (ref 0–4)

## 2017-11-27 PROCEDURE — 99283 EMERGENCY DEPT VISIT LOW MDM: CPT

## 2017-11-27 PROCEDURE — 81025 URINE PREGNANCY TEST: CPT | Performed by: PHYSICIAN ASSISTANT

## 2017-11-27 PROCEDURE — 81001 URINALYSIS AUTO W/SCOPE: CPT | Performed by: PHYSICIAN ASSISTANT

## 2017-11-27 PROCEDURE — 99282 EMERGENCY DEPT VISIT SF MDM: CPT

## 2017-11-27 NOTE — DISCHARGE INSTRUCTIONS

## 2017-11-27 NOTE — ED TRIAGE NOTES
Patient states she is 3 1/2 months pregnant. Patient was seen for a UTI last week . Patient states it took a long time to finish the antibiotics. Patient complains of back pain at this time.

## 2017-11-27 NOTE — ED PROVIDER NOTES
HPI Comments: 34yoF currently 15 weeks pregnant to ED for re-evaluation. Pt had significant hyperemesis gravidarum and recurrent UTIs with this pregnancy and it has taken her a long time to finish this more recent course of antibiotics and flagyl. She wants to make sure UTI has resolved. She denies any fevers. Only reports slight intermittent burning on urination, but no abd/pelvic pain, vaginal bleeding/discharge, fever, chills or any other symptoms. No aggravating or alleviating symptoms. Has 1st OB appt this week. Patient is a 32 y.o. female presenting with urinary pain. The history is provided by the patient. Urinary Pain    This is a recurrent problem. The current episode started more than 1 week ago. The problem occurs intermittently. The problem has been resolved. The quality of the pain is described as burning. The patient is experiencing no pain. There has been no fever. She is sexually active. There is a history of pyelonephritis. Associated symptoms include flank pain (left). Pertinent negatives include no chills, no sweats, no nausea, no vomiting, no discharge, no frequency, no hematuria, no hesitancy, no urgency, no vaginal discharge, no abdominal pain and no back pain. Yes, the patient is pregnant. She has tried antibiotics for the symptoms. The treatment provided significant relief. Her past medical history is significant for recurrent UTIs. Past Medical History:   Diagnosis Date    Miscarriage        Past Surgical History:   Procedure Laterality Date    HX DILATION AND CURETTAGE  2013         No family history on file. Social History     Social History    Marital status: SINGLE     Spouse name: N/A    Number of children: N/A    Years of education: N/A     Occupational History    Not on file.      Social History Main Topics    Smoking status: Never Smoker    Smokeless tobacco: Never Used    Alcohol use No    Drug use: No    Sexual activity: Yes     Partners: Male     Birth control/ protection: Condom     Other Topics Concern    Not on file     Social History Narrative         ALLERGIES: Review of patient's allergies indicates no known allergies. Review of Systems   Constitutional: Negative for chills. Gastrointestinal: Negative for abdominal pain, nausea and vomiting. Genitourinary: Positive for flank pain (left). Negative for frequency, hematuria, hesitancy, urgency and vaginal discharge. Musculoskeletal: Negative for back pain. All other systems reviewed and are negative. Vitals:    11/27/17 0930   BP: 111/68   Pulse: 91   Resp: 17   Temp: 98.4 °F (36.9 °C)   SpO2: 99%            Physical Exam   Constitutional: She appears well-developed and well-nourished. No distress. HENT:   Head: Normocephalic and atraumatic. Right Ear: External ear normal.   Left Ear: External ear normal.   Nose: Nose normal.   Mouth/Throat: Oropharynx is clear and moist. No oropharyngeal exudate. Eyes: Conjunctivae and EOM are normal. Pupils are equal, round, and reactive to light. Neck: Normal range of motion. Neck supple. Cardiovascular: Normal rate and regular rhythm. Pulmonary/Chest: Effort normal and breath sounds normal.   Abdominal: Soft. Normal appearance. There is no tenderness. There is no CVA tenderness. Musculoskeletal: She exhibits no edema. Lymphadenopathy:     She has no cervical adenopathy. Neurological: She is alert. Skin: Skin is warm and dry. She is not diaphoretic. Psychiatric: She has a normal mood and affect. MDM  Number of Diagnoses or Management Options  Diagnosis management comments: Patient is 15 weeks pregnant here for recheck of UTI after finishing her abx. Essentially asymptomatic. UA is clear. FHTs 150s with good fetal movement (done by me). Pt is satisfied, has OB appt this week. Questions answered by me.  LUÍS Archuleta 11:55 AM         Amount and/or Complexity of Data Reviewed  Clinical lab tests: reviewed and ordered    Risk of Complications, Morbidity, and/or Mortality  Presenting problems: minimal  Diagnostic procedures: minimal  Management options: minimal    Patient Progress  Patient progress: improved    ED Course       Procedures

## 2020-05-06 ENCOUNTER — HOSPITAL ENCOUNTER (EMERGENCY)
Age: 30
Discharge: HOME | End: 2020-05-06
Attending: EMERGENCY MEDICINE
Payer: COMMERCIAL

## 2020-05-06 VITALS
RESPIRATION RATE: 16 BRPM | OXYGEN SATURATION: 100 % | HEIGHT: 66 IN | TEMPERATURE: 96.7 F | BODY MASS INDEX: 21.35 KG/M2 | DIASTOLIC BLOOD PRESSURE: 47 MMHG | HEART RATE: 81 BPM | SYSTOLIC BLOOD PRESSURE: 116 MMHG

## 2020-05-06 DIAGNOSIS — K04.7 DENTAL ABSCESS: Primary | ICD-10-CM

## 2020-05-06 PROCEDURE — 99282 EMERGENCY DEPT VISIT SF MDM: CPT

## 2020-05-06 RX ORDER — IBUPROFEN 800 MG/1
800 TABLET ORAL
Qty: 30 TAB | Refills: 0 | Status: SHIPPED | OUTPATIENT
Start: 2020-05-06 | End: 2020-05-06

## 2020-05-06 RX ORDER — HYDROCODONE BITARTRATE AND ACETAMINOPHEN 5; 325 MG/1; MG/1
1 TABLET ORAL
Qty: 12 TAB | Refills: 0 | Status: SHIPPED | OUTPATIENT
Start: 2020-05-06 | End: 2020-05-09

## 2020-05-06 RX ORDER — IBUPROFEN 800 MG/1
800 TABLET ORAL
Qty: 30 TAB | Refills: 0 | Status: SHIPPED | OUTPATIENT
Start: 2020-05-06 | End: 2020-07-30

## 2020-05-06 RX ORDER — CLINDAMYCIN HYDROCHLORIDE 150 MG/1
300 CAPSULE ORAL 4 TIMES DAILY
Qty: 80 CAP | Refills: 0 | Status: SHIPPED | OUTPATIENT
Start: 2020-05-06 | End: 2020-07-30

## 2020-05-06 NOTE — ED PROVIDER NOTES
51-year-old female presents for evaluation of dental abscess. The patient has longstanding cavity with erosion involving the right maxilla. It is subsequently become more painful and swollen over the last week. She now has malar swelling pain. Due to the situation with COVID she has not been able to see a dentist or oral surgeon. Pain is been reasonably well managed with ibuprofen. Past Medical History:   Diagnosis Date    Miscarriage        Past Surgical History:   Procedure Laterality Date    HX DILATION AND CURETTAGE  2013         History reviewed. No pertinent family history.     Social History     Socioeconomic History    Marital status: SINGLE     Spouse name: Not on file    Number of children: Not on file    Years of education: Not on file    Highest education level: Not on file   Occupational History    Not on file   Social Needs    Financial resource strain: Not on file    Food insecurity     Worry: Not on file     Inability: Not on file    Transportation needs     Medical: Not on file     Non-medical: Not on file   Tobacco Use    Smoking status: Current Every Day Smoker    Smokeless tobacco: Never Used   Substance and Sexual Activity    Alcohol use: No    Drug use: No    Sexual activity: Yes     Partners: Male     Birth control/protection: Condom   Lifestyle    Physical activity     Days per week: Not on file     Minutes per session: Not on file    Stress: Not on file   Relationships    Social connections     Talks on phone: Not on file     Gets together: Not on file     Attends Sikhism service: Not on file     Active member of club or organization: Not on file     Attends meetings of clubs or organizations: Not on file     Relationship status: Not on file    Intimate partner violence     Fear of current or ex partner: Not on file     Emotionally abused: Not on file     Physically abused: Not on file     Forced sexual activity: Not on file   Other Topics Concern    Not on file   Social History Narrative    Not on file         ALLERGIES: Patient has no known allergies. Review of Systems   Constitutional: Negative for fever. HENT: Positive for dental problem and facial swelling. Respiratory: Negative for cough and shortness of breath. All other systems reviewed and are negative. Vitals:    05/06/20 0943   BP: 116/47   Pulse: 81   Resp: 16   Temp: 96.7 °F (35.9 °C)   SpO2: 100%   Height: 5' 6\" (1.676 m)            Physical Exam  Vitals signs and nursing note reviewed. Constitutional:       General: She is not in acute distress. Appearance: She is well-developed. HENT:      Head: Normocephalic and atraumatic. Comments: Right malar swelling and tenderness appreciated. There is widespread dental decay with deep caries and erosion noted to tooth #2 which is the source of pain. There is surrounding inflammation and swelling of the gingiva at the site. Tooth #1 and #3 are absent. No trismus. Eyes:      General: No scleral icterus. Neck:      Musculoskeletal: No neck rigidity. Cardiovascular:      Rate and Rhythm: Normal rate. Pulmonary:      Effort: Pulmonary effort is normal.   Lymphadenopathy:      Cervical: No cervical adenopathy. Skin:     General: Skin is warm and dry. Neurological:      Mental Status: She is alert and oriented to person, place, and time. MDM  Number of Diagnoses or Management Options  Dental abscess:   Diagnosis management comments: Impression: Dental abscess. Nontoxic clinical appearance. Afebrile. Procedures    Diagnosis:   1. Dental abscess      1. Clindamycin 300 mg 4 times a day. 2.  Recommend taking a probiotic while taking clindamycin to help prevent antibiotic associated diarrhea. 3.  Ibuprofen 800 mg 3 times a day for pain and inflammation. 4.  Norco for more severe pain (#12). 5.  Follow-up with oral surgery soon as possible for recheck and dental extraction as clinically indicated.   6. Return for medical emergencies as needed. Disposition: home    Follow-up Information     Follow up With Specialties Details Why Contact Info    Jerzy Hartley DDS Oral Surgery  next available appointment, for recheck of ongoing symptoms [address]  [address] EMERGENCY DEPT Emergency Medicine  As needed, If symptoms worsen [address] [facility]  [phone]          Patient's Medications   Start Taking    CLINDAMYCIN (CLEOCIN) 150 MG CAPSULE    Take 2 Caps by mouth four (4) times daily. HYDROCODONE-ACETAMINOPHEN (NORCO) 5-325 MG PER TABLET    Take 1 Tab by mouth every four (4) hours as needed for Pain for up to 3 days. Max Daily Amount: 6 Tabs. IBUPROFEN (MOTRIN) 800 MG TABLET    Take 1 Tab by mouth every eight (8) hours as needed for Pain (with food). Continue Taking    No medications on file   These Medications have changed    No medications on file   Stop Taking    METOCLOPRAMIDE HCL (REGLAN) 10 MG TABLET    Take 1 Tab by mouth every six (6) hours as needed for Nausea. ONDANSETRON (ZOFRAN ODT) 4 MG DISINTEGRATING TABLET    Take 1 Tab by mouth every eight (8) hours as needed for Nausea.

## 2020-05-06 NOTE — DISCHARGE INSTRUCTIONS
1.  Clindamycin 300 mg 4 times a day. 2.  Recommend taking a probiotic while taking clindamycin to help prevent antibiotic associated diarrhea. 3.  Ibuprofen 800 mg 3 times a day for pain and inflammation. 4.  Norco for more severe pain (#12). 5.  Follow-up with oral surgery soon as possible for recheck and dental extraction as clinically indicated. 6.  Return for medical emergencies as needed. Follow-up with one of the provided dental clinics below:    [facility] ([address] [phone] ([facility] [phone]    Call to schedule an appointment. Patient Education        Abscessed Tooth: Care Instructions  Your Care Instructions    An abscessed tooth is a tooth that has a pocket of pus in the tissues around it. Pus forms when the body tries to fight an infection caused by bacteria. If the pus cannot drain, it forms an abscess. An abscessed tooth can cause red, swollen gums and throbbing pain, especially when you chew. You may have a bad taste in your mouth and a fever, and your jaw may swell. Damage to the tooth, untreated tooth decay, or gum disease can cause an abscessed tooth. An abscessed tooth needs to be treated by a dental professional right away. If it is not treated, the infection could spread to other parts of your body. Your dentist will give you antibiotics to stop the infection. He or she may make a hole in the tooth or cut open (julia) the abscess inside your mouth so that the infection can drain, which should relieve your pain. You may need to have a root canal treatment, which tries to save your tooth by taking out the infected pulp and replacing it with a healing medicine and/or a filling. If these treatments do not work, your tooth may have to be removed. Follow-up care is a key part of your treatment and safety.  Be sure to make and go to all appointments, and call your doctor if you are having problems. It's also a good idea to know your test results and keep a list of the medicines you take. How can you care for yourself at home? · Reduce pain and swelling in your face and jaw by putting ice or a cold pack on the outside of your cheek. Do this for 10 to 20 minutes at a time. Put a thin cloth between the ice and your skin. · Take pain medicines exactly as directed. ? If the doctor gave you a prescription medicine for pain, take it as prescribed. ? If you are not taking a prescription pain medicine, ask your doctor if you can take an over-the-counter medicine. · Take antibiotics as directed. Do not stop taking them just because you feel better. You need to take the full course of antibiotics. To prevent tooth abscess  · Brush and floss every day. Have regular dental checkups. · Eat a healthy diet. Avoid sugary foods and drinks. · Do not smoke or vape with nicotine. And don't use spit tobacco. Tobacco and nicotine slow your ability to heal. They increase your risk for gum disease and cancer of the mouth and throat. If you need help quitting, talk to your doctor about stop-smoking programs and medicines. These can increase your chances of quitting for good. When should you call for help? Call 911 anytime you think you may need emergency care. For example, call if:    · You have trouble breathing.    Call your doctor now or seek immediate medical care if:    · You have new or worse symptoms of infection, such as:  ? Increased pain, swelling, warmth, or redness. ? Red streaks leading from the area. ? Pus draining from the area. ? A fever.    Watch closely for changes in your health, and be sure to contact your doctor if:    · You do not get better as expected. Where can you learn more? Go to http://marina-shana.info/  Enter L466 in the search box to learn more about \"Abscessed Tooth: Care Instructions. \"  Current as of: July 28, 2019Content Version: 12.4  © 0678-1256 [employer]. Care instructions adapted under license by [employer] (which disclaims liability or warranty for this information). If you have questions about a medical condition or this instruction, always ask your healthcare professional. [place] [address] any warranty or liability for your use of this information.

## 2020-07-24 ENCOUNTER — HOSPITAL ENCOUNTER (OUTPATIENT)
Dept: LAB | Age: 30
Discharge: HOME | End: 2020-07-24
Payer: COMMERCIAL

## 2020-07-24 ENCOUNTER — OFFICE VISIT (OUTPATIENT)
Dept: FAMILY MEDICINE CLINIC | Facility: CLINIC | Age: 30
End: 2020-07-24

## 2020-07-24 VITALS
TEMPERATURE: 98.3 F | HEART RATE: 84 BPM | SYSTOLIC BLOOD PRESSURE: 88 MMHG | DIASTOLIC BLOOD PRESSURE: 58 MMHG | RESPIRATION RATE: 18 BRPM | OXYGEN SATURATION: 100 %

## 2020-07-24 DIAGNOSIS — Z20.822 SUSPECTED COVID-19 VIRUS INFECTION: Primary | ICD-10-CM

## 2020-07-24 PROCEDURE — 87635 SARS-COV-2 COVID-19 AMP PRB: CPT

## 2020-07-24 NOTE — PROGRESS NOTES
1:30 PM    ASSESSMENT and PLAN    ICD-10-CM ICD-9-CM    1. Suspected COVID-19 virus infection  Z20.828 V01.79 NOVEL CORONAVIRUS (COVID-19)    The patient is has several symptoms consistent with possible coronavirus infection. A test for COVID-19 was ordered. Follow-up and Dispositions    · Return for Follow up on illness. lab results and schedule of future lab studies reviewed with patient    This is a [age] [address] old female who presents with headache, cough, loss of sense of smell and taste and pain in the side of the chest.    The onset was one week. To 10 days ago. The severity of the problem is moderate. The duration and timing of the problem is described as improving. Modifying factors include nothing. Associated signs and symptoms include nausea. There is no diarrhea. No muscle aches. .  The patient has tried nothing with no change the patient has had no prior episodes  The patient works at [employer]. 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 The  center where her son was going at 3 person who tested positive for COVID-19 and the  center shutdown. .    In the last month the patient has been in contact with individuals who was confirmed or suspected to have COVID-19. In the last month the patient has not traveled internationally. In the last month, the patient has traveled to an area with a known COVID-19 outbreak, [facility]  The patient has traveled locally to grocery stores, work. .    Active Ambulatory Problems     Diagnosis Date Noted    Normal pregnancy 11/28/2014    Hyperemesis gravidarum 02/04/2015    Term pregnancy 07/15/2015    Abscess 05/20/2016     Resolved Ambulatory Problems     Diagnosis Date Noted    Influenza A 01/15/2013    UTI (urinary tract infection) 01/15/2013     Past Medical History:   Diagnosis Date    Miscarriage      Smoking history; smoker 2 to 3 cigarettes a day  Review of Systems   Constitutional: Positive for appetite change. Negative for activity change, chills and fever. HENT: Positive for congestion and rhinorrhea. Negative for sore throat and voice change. Eyes: Negative for redness. Respiratory: Positive for cough and shortness of breath. Negative for chest tightness and wheezing. Gastrointestinal: Positive for nausea and vomiting. Negative for diarrhea. Genitourinary: Negative for dysuria. Musculoskeletal: Positive for arthralgias. Negative for myalgias. Skin: Negative for color change. Neurological: Negative for weakness. Visit Vitals  BP (!) 88/58 (BP 1 Location: Left arm, BP Patient Position: Sitting)   Pulse 84   Temp 98.3 °F (36.8 °C) (Oral)   Resp 18   SpO2 100%     Physical Exam  Constitutional:       Appearance: Normal appearance. HENT:      Head: Normocephalic. Right Ear: External ear normal.      Left Ear: External ear normal.      Nose: No congestion or rhinorrhea. Mouth/Throat:      Mouth: Mucous membranes are moist.      Pharynx: No posterior oropharyngeal erythema. Eyes:      General: No scleral icterus. Pupils: Pupils are equal, round, and reactive to light. Neck:      Musculoskeletal: Normal range of motion. Cardiovascular:      Rate and Rhythm: Normal rate and regular rhythm. Heart sounds: Normal heart sounds. No murmur. No friction rub. No gallop. Pulmonary:      Effort: Pulmonary effort is normal.      Breath sounds: Normal breath sounds. Abdominal:      General: Bowel sounds are normal.      Palpations: Abdomen is soft. Musculoskeletal:         General: No swelling or tenderness. Skin:     General: Skin is warm and dry. Neurological:      General: No focal deficit present. Mental Status: She is alert and oriented to person, place, and time. Psychiatric:         Mood and Affect: Mood normal.         Behavior: Behavior normal.         Thought Content:  Thought content normal.       Results for orders placed or performed during the hospital encounter of 11/27/17   HCG URINE, QL   Result Value Ref Range    HCG urine, QL POSITIVE (A) NEG     URINALYSIS W/ RFLX MICROSCOPIC   Result Value Ref Range    Color YELLOW      Appearance CLEAR      Specific gravity 1.024 1.005 - 1.030      pH (UA) 7.0 5.0 - 8.0      Protein NEGATIVE  NEG mg/dL    Glucose NEGATIVE  NEG mg/dL    Ketone NEGATIVE  NEG mg/dL    Bilirubin NEGATIVE  NEG      Blood NEGATIVE  NEG      Urobilinogen 1.0 0.2 - 1.0 EU/dL    Nitrites NEGATIVE  NEG      Leukocyte Esterase SMALL (A) NEG     URINE MICROSCOPIC ONLY   Result Value Ref Range    WBC 0 to 5 0 - 4 /hpf    Epithelial cells 1+ 0 - 5 /lpf    Mucus 1+ (A) NEG /lpf    Amorphous Crystals 1+ (A) NEG       Reviewed with him that COVID-19 pandemic is an evolving situation with rapidly changing recommendations & guidelines. Medical decisions are made based on the the best information available at the time. It is recommended that the patient  stay tuned for updates published by trusted sources and to advise the patient's PCP of any unexpected changes in clinical condition         Disclaimer:  Discussed expected course/resolution/complications of diagnosis in detail with patient. Medication risks/benefits/costs/interactions/alternatives discussed with patient. The patient expressed understanding with the diagnosis and plan. This note was done with the assistance of dragon speech software.   Some inadvertent errors or omissions may be present  1:30 PM

## 2020-07-24 NOTE — PROGRESS NOTES
Deandre Lira presents today for   Chief Complaint   Patient presents with    Other     pt c/o loss of taste and smell x 1 week    Headache     intermittent x 1 week    Nausea       Is someone accompanying this pt? no    Is the patient using any DME equipment during OV? no    Travel and Exposure Screening was performed during check in or rooming process Yes  Yes: Comment: patiuents hasnt traveled. pt works at [employer]. Depression Screening:  3 most recent PHQ Screens 7/24/2020   Little interest or pleasure in doing things Not at all   Feeling down, depressed, irritable, or hopeless Not at all   Total Score PHQ 2 0       Fall Risk  No flowsheet data found.     This Visit Test

## 2020-07-24 NOTE — LETTER
NOTIFICATION RETURN TO WORK / SCHOOL 
 
7/24/2020 1:29 PM 
 
Ms. Genesis Guzman [address] [place] [id number] To Whom It May Concern: 
 
Genesis Guzman is currently under the care of [address] and must remain on quarantine until test results return and indicate Covid-19 is \"undetected\". She will return to work/school on: 7/31/2020 If there are questions or concerns please have the patient contact our office. Sincerely, Jeff Mcfarland MD

## 2020-07-27 LAB — SARS-COV-2, COV2NT: DETECTED

## 2020-07-28 ENCOUNTER — TELEPHONE (OUTPATIENT)
Dept: FAMILY MEDICINE CLINIC | Facility: CLINIC | Age: 30
End: 2020-07-28

## 2020-07-28 DIAGNOSIS — U07.1 COVID-19: Primary | ICD-10-CM

## 2020-07-28 NOTE — PROGRESS NOTES
Please call the Covid 19 test was positive. The patient should quarantine, for a full 14 days from the day of the test and come in for recheck at that time, August 6, 2020.

## 2020-07-30 ENCOUNTER — HOSPITAL ENCOUNTER (EMERGENCY)
Age: 30
Discharge: HOME | End: 2020-07-31
Attending: EMERGENCY MEDICINE | Admitting: EMERGENCY MEDICINE
Payer: COMMERCIAL

## 2020-07-30 VITALS
OXYGEN SATURATION: 97 % | BODY MASS INDEX: 23.3 KG/M2 | TEMPERATURE: 98.5 F | WEIGHT: 145 LBS | SYSTOLIC BLOOD PRESSURE: 113 MMHG | RESPIRATION RATE: 15 BRPM | HEART RATE: 106 BPM | DIASTOLIC BLOOD PRESSURE: 72 MMHG | HEIGHT: 66 IN

## 2020-07-30 DIAGNOSIS — L02.91 ABSCESS: Primary | ICD-10-CM

## 2020-07-30 PROCEDURE — 99283 EMERGENCY DEPT VISIT LOW MDM: CPT

## 2020-07-30 PROCEDURE — 74011250637 HC RX REV CODE- 250/637: Performed by: PHYSICIAN ASSISTANT

## 2020-07-30 RX ORDER — CLINDAMYCIN HYDROCHLORIDE 300 MG/1
300 CAPSULE ORAL 4 TIMES DAILY
Qty: 28 CAP | Refills: 0 | Status: SHIPPED | OUTPATIENT
Start: 2020-07-30 | End: 2020-08-06

## 2020-07-30 RX ORDER — CLINDAMYCIN HYDROCHLORIDE 150 MG/1
300 CAPSULE ORAL
Status: COMPLETED | OUTPATIENT
Start: 2020-07-30 | End: 2020-07-30

## 2020-07-30 RX ORDER — OXYCODONE AND ACETAMINOPHEN 5; 325 MG/1; MG/1
1 TABLET ORAL
Qty: 15 TAB | Refills: 0 | Status: SHIPPED | OUTPATIENT
Start: 2020-07-30 | End: 2020-07-30

## 2020-07-30 RX ORDER — OXYCODONE AND ACETAMINOPHEN 5; 325 MG/1; MG/1
2 TABLET ORAL
Status: COMPLETED | OUTPATIENT
Start: 2020-07-30 | End: 2020-07-30

## 2020-07-30 RX ORDER — OXYCODONE AND ACETAMINOPHEN 5; 325 MG/1; MG/1
1 TABLET ORAL
Qty: 15 TAB | Refills: 0 | Status: ON HOLD | OUTPATIENT
Start: 2020-07-30 | End: 2020-08-06

## 2020-07-30 RX ADMIN — OXYCODONE HYDROCHLORIDE AND ACETAMINOPHEN 2 TABLET: 5; 325 TABLET ORAL at 23:33

## 2020-07-30 RX ADMIN — CLINDAMYCIN HYDROCHLORIDE 300 MG: 150 CAPSULE ORAL at 23:33

## 2020-07-31 NOTE — ED PROVIDER NOTES
EMERGENCY DEPARTMENT HISTORY AND PHYSICAL EXAM    Date: 7/30/2020  Patient Name: Ellouise Runner    History of Presenting Illness     Chief Complaint   Patient presents with    Abscess         History Provided By: patient    Chief Complaint: abscess  Duration: 5 days  Timing:acute  Location: R inner buttocks  Quality: sharp throbbing   Severity: moderate to severe  Modifying Factors: warm compress has not helped  Associated Symptoms: none      Additional History (Context): Ellouise Runner is a 27 y.o. female with no pertinent PMH who presents with complaints of an abscess to the right inner buttocks for the past 5 days. Patient states she is been applying warm compress with no relief her symptoms. Denies taking any antibiotics at this time. Patient states she also recently tested positive for COVID-19 on 7/24. No other complaints reported at this time. PCP: None    Current Facility-Administered Medications   Medication Dose Route Frequency Provider Last Rate Last Dose    oxyCODONE-acetaminophen (PERCOCET) 5-325 mg per tablet 2 Tab  2 Tab Oral NOW Brittany Woodson PA-C        clindamycin (CLEOCIN) capsule 300 mg  300 mg Oral NOW Brittany Woodson PA-C         Current Outpatient Medications   Medication Sig Dispense Refill    clindamycin (CLEOCIN) 300 mg capsule Take 1 Cap by mouth four (4) times daily for 7 days. 28 Cap 0    oxyCODONE-acetaminophen (Percocet) 5-325 mg per tablet Take 1 Tab by mouth every six (6) hours as needed for Pain for up to 5 days. Max Daily Amount: 4 Tabs. 15 Tab 0       Past History     Past Medical History:  Past Medical History:   Diagnosis Date    Miscarriage        Past Surgical History:  Past Surgical History:   Procedure Laterality Date    HX DILATION AND CURETTAGE  2013       Family History:  History reviewed. No pertinent family history.     Social History:  Social History     Tobacco Use    Smoking status: Current Every Day Smoker    Smokeless tobacco: Never Used   Substance Use Topics    Alcohol use: No    Drug use: No       Allergies:  No Known Allergies      Review of Systems   Review of Systems   Constitutional: Negative. Negative for chills and fever. HENT: Negative. Negative for congestion, ear pain and rhinorrhea. Eyes: Negative. Negative for pain and redness. Respiratory: Negative. Negative for cough, shortness of breath, wheezing and stridor. Cardiovascular: Negative. Negative for chest pain and leg swelling. Gastrointestinal: Negative. Negative for abdominal pain, constipation, diarrhea, nausea and vomiting. Genitourinary: Negative. Negative for dysuria and frequency. Musculoskeletal: Negative. Negative for back pain and neck pain. Skin: Positive for wound. Negative for rash. Neurological: Negative. Negative for dizziness, seizures, syncope and headaches. All other systems reviewed and are negative. All Other Systems Negative  Physical Exam     Vitals:    07/30/20 2115   BP: 113/72   Pulse: (!) 106   Resp: 15   Temp: 98.5 °F (36.9 °C)   SpO2: 97%   Weight: 65.8 kg (145 lb)   Height: 5' 6\" (1.676 m)     Physical Exam  Vitals signs and nursing note reviewed. Constitutional:       General: She is not in acute distress. Appearance: She is well-developed. She is not diaphoretic. HENT:      Head: Normocephalic and atraumatic. Eyes:      General: No scleral icterus. Right eye: No discharge. Left eye: No discharge. Conjunctiva/sclera: Conjunctivae normal.   Neck:      Musculoskeletal: Normal range of motion and neck supple. Cardiovascular:      Rate and Rhythm: Normal rate. Pulmonary:      Effort: Pulmonary effort is normal. No respiratory distress. Breath sounds: No stridor. Musculoskeletal: Normal range of motion. Skin:     General: Skin is warm and dry. Findings: Erythema present. No rash.       Comments: 2 cm erythematous abscess to the R inner gluteal cleft, no fluctuance noted on palpation. Neurological:      Mental Status: She is alert and oriented to person, place, and time. Coordination: Coordination normal.      Comments: Gait is steady and patient exhibits no evidence of ataxia. Patient is able to ambulate without difficulty. No focal neurological deficit noted. No facial droop, slurred speech, or evidence of altered mentation noted on exam.     Psychiatric:         Behavior: Behavior normal.         Thought Content: Thought content normal.                Diagnostic Study Results     Labs -   No results found for this or any previous visit (from the past 12 hour(s)). Radiologic Studies -   No orders to display     CT Results  (Last 48 hours)    None        CXR Results  (Last 48 hours)    None            Medical Decision Making   I am the first provider for this patient. I reviewed the vital signs, available nursing notes, past medical history, past surgical history, family history and social history. Vital Signs-Reviewed the patient's vital signs. Records Reviewed: Brittany Woodson PA-C     Procedures:  Procedures    Provider Notes (Medical Decision Making): Impression:  COVID 23, abscess    Explained to the patient that her abscess is not yet ready to be I&D'd. We will plan to discharge patient with pain medication and clindamycin. Recommendation for continued warm compress several times daily with reevaluation in 48 hours. Patient agrees with this plan. Brittany Woodson PA-C     MED RECONCILIATION:  Current Facility-Administered Medications   Medication Dose Route Frequency    oxyCODONE-acetaminophen (PERCOCET) 5-325 mg per tablet 2 Tab  2 Tab Oral NOW    clindamycin (CLEOCIN) capsule 300 mg  300 mg Oral NOW     Current Outpatient Medications   Medication Sig    clindamycin (CLEOCIN) 300 mg capsule Take 1 Cap by mouth four (4) times daily for 7 days.     oxyCODONE-acetaminophen (Percocet) 5-325 mg per tablet Take 1 Tab by mouth every six (6) hours as needed for Pain for up to 5 days. Max Daily Amount: 4 Tabs. Disposition:  D/c    DISCHARGE NOTE:   Patient is stable for discharge at this time. I have discussed all the findings from today's work up with the patient, including lab results and imaging. I have answered all questions. Rx for clinda and percocet given. Rest and close follow-up with the PCP recommended this week. Return to the ED immediately for any new or worsening symptoms. Brittany Woodson PA-C   Follow-up Information     Follow up With Specialties Details Why Contact Info    [facility] EMERGENCY DEPT Emergency Medicine In 2 days For wound re-check [address] [place] [id number]  [phone]          Current Discharge Medication List      START taking these medications    Details   clindamycin (CLEOCIN) 300 mg capsule Take 1 Cap by mouth four (4) times daily for 7 days. Qty: 28 Cap, Refills: 0      oxyCODONE-acetaminophen (Percocet) 5-325 mg per tablet Take 1 Tab by mouth every six (6) hours as needed for Pain for up to 5 days. Max Daily Amount: 4 Tabs. Qty: 15 Tab, Refills: 0    Associated Diagnoses: Abscess                 Diagnosis     Clinical Impression:   1.  Abscess

## 2020-07-31 NOTE — ED TRIAGE NOTES
Patient ambulatory to triage. States she has an abscess on her tailbone x1 week.  +Covid test on 7/24

## 2020-07-31 NOTE — DISCHARGE INSTRUCTIONS
Patient Education        Skin Abscess: Care Instructions  Your Care Instructions     A skin abscess is a bacterial infection that forms a pocket of pus. A boil is a kind of skin abscess. The doctor may have cut an opening in the abscess so that the pus can drain out. You may have gauze in the cut so that the abscess will stay open and keep draining. You may need antibiotics. You will need to follow up with your doctor to make sure the infection has gone away. The doctor has checked you carefully, but problems can develop later. If you notice any problems or new symptoms, get medical treatment right away. Follow-up care is a key part of your treatment and safety. Be sure to make and go to all appointments, and call your doctor if you are having problems. It's also a good idea to know your test results and keep a list of the medicines you take. How can you care for yourself at home? · Apply warm and dry compresses, a heating pad set on low, or a hot water bottle 3 or 4 times a day for pain. Keep a cloth between the heat source and your skin. · If your doctor prescribed antibiotics, take them as directed. Do not stop taking them just because you feel better. You need to take the full course of antibiotics. · Take pain medicines exactly as directed. ? If the doctor gave you a prescription medicine for pain, take it as prescribed. ? If you are not taking a prescription pain medicine, ask your doctor if you can take an over-the-counter medicine. · Keep your bandage clean and dry. Change the bandage whenever it gets wet or dirty, or at least one time a day. · If the abscess was packed with gauze:  ? Keep follow-up appointments to have the gauze changed or removed. If the doctor instructed you to remove the gauze, follow the instructions you were given for how to remove it. ? After the gauze is removed, soak the area in warm water for 15 to 20 minutes 2 times a day, until the wound closes.   When should you call for help? Call your doctor now or seek immediate medical care if:  · You have signs of worsening infection, such as:  ? Increased pain, swelling, warmth, or redness. ? Red streaks leading from the infected skin. ? Pus draining from the wound. ? A fever. Watch closely for changes in your health, and be sure to contact your doctor if:  · You do not get better as expected. Where can you learn more? Go to http://marina-shana.info/  Enter S061 in the search box to learn more about \"Skin Abscess: Care Instructions. \"  Current as of: 2019               Content Version: 12.5  © 3193-6004 [employer]. Care instructions adapted under license by [employer] (which disclaims liability or warranty for this information). If you have questions about a medical condition or this instruction, always ask your healthcare professional. [address] any warranty or liability for your use of this information. "[employer]" Activation    Thank you for requesting access to "[employer]". Please follow the instructions below to securely access and download your online medical record. "[employer]" allows you to send messages to your doctor, view your test results, renew your prescriptions, schedule appointments, and more. How Do I Sign Up? 1. In your internet browser, go to [email or web address]  2. Click on the First Time User? Click Here link in the Sign In box. You will be redirect to the New Member Sign Up page. 3. Enter your "[employer]" Access Code exactly as it appears below. You will not need to use this code after youve completed the sign-up process. If you do not sign up before the expiration date, you must request a new code. "[employer]" Access Code: [email or web address]  Expires: 2020  3:11 PM (This is the date your "[employer]" access code will )    4.  Enter the last four digits of your Social Security Number (xxxx) and Date of Birth (mm/dd/yyyy) as indicated and click Submit. You will be taken to the next sign-up page. 5. Create a [employer] [place] ID. This will be your [employer] [place] login ID and cannot be changed, so think of one that is secure and easy to remember. 6. Create a [employer] [place] password. You can change your password at any time. 7. Enter your Password Reset Question and Answer. This can be used at a later time if you forget your password. 8. Enter your e-mail address. You will receive e-mail notification when new information is available in [address]. 9. Click Sign Up. You can now view and download portions of your medical record. 10. Click the Download Summary menu link to download a portable copy of your medical information. Additional Information    If you have questions, please visit the Frequently Asked Questions section of the [employer] [place] website at [email or web address] Remember, [employer] [place] is NOT to be used for urgent needs. For medical emergencies, dial 911.

## 2020-08-01 ENCOUNTER — PATIENT OUTREACH (OUTPATIENT)
Dept: CASE MANAGEMENT | Age: 30
End: 2020-08-01

## 2020-08-01 NOTE — PROGRESS NOTES
Patient is currently enrolled in a remote symptom monitoring program.  Start day 8.2.2020;  End Date 8.17.2020

## 2020-08-01 NOTE — PROGRESS NOTES
Patient contacted regarding COVID-19 diagnosis. Discussed COVID-19 related testing which was available at this time. Test results were positive. Patient informed of results, if available? yes   2020 12:36 PM - Micky, Lab In [facility]     Component  Value  Flag  Ref Range  Units  Status    SARS-CoV-2  Detected  Abnormal    Not Detected     Final    Comment:      Care Transition Nurse/ Ambulatory Care Manager contacted the patient by telephone to perform post discharge assessment. Verified name and  with patient as identifiers. Provided introduction to self, and explanation of the CTN/ACM role, and reason for call due to risk factors for infection and/or exposure to COVID-19. Patient reported that she is doing well. Patient reported that she has her antibiotic and pain medication. CDC guidelines, self quarantine and Red flags on when to go back to ED (Chest pain, difficulty breathing, shortness of breath, high fevers and/or worsening of symptoms) were reviewed and discuss with Pt. Pt. Verbalized and repeated back understanding. Pt. Aware of appt with [facility] on 20. Reviewed self-quarantine instructions with patient. Explained to patient self-quarantine is isolating self to one room and using one bathroom, if possible and to avoid contact with family and others for a period of 14 days to avoid spreading illness. Symptoms reviewed with patient who verbalized the following symptoms: no new symptoms and no worsening symptoms. Due to no new or worsening symptoms encounter was not routed to provider for escalation. Discussed follow-up appointments. If no appointment was previously scheduled, appointment scheduling offered: already have a scheduled appt. [facility] follow up appointment(s):   Future Appointments   Date Time Provider Sergio Faye   2020  9:00 AM [facility] SCHED     Non-[facility] follow up appointment(s): none noted at this time.        Advance Care Planning: Does patient have an Advance Directive: not on file     Patient has following risk factors of: no known risk factors. CTN/ACM reviewed discharge instructions, medical action plan and red flags such as increased shortness of breath, increasing fever and signs of decompensation with patient who verbalized understanding. Discussed exposure protocols and quarantine with CDC Guidelines What to do if you are sick with coronavirus disease 2019.  Patient was given an opportunity for questions and concerns. The patient agrees to contact the [facility] exposure line [phone], local [facility] department [facility]  ([phone]) and PCP office for questions related to their healthcare. CTN/ACM provided contact information for future needs. Reviewed and educated patient on any new and changed medications related to discharge diagnosis. Patient/family/caregiver given information for [employer] and agrees to enroll yes  Patient's preferred e-mail:  [email or web address]     Pt will be further monitored by COVID Loop Team based on severity of symptoms and risk factors.

## 2020-08-03 ENCOUNTER — HOSPITAL ENCOUNTER (INPATIENT)
Age: 30
LOS: 2 days | Discharge: HOME HEALTH | DRG: 364 | End: 2020-08-06
Attending: EMERGENCY MEDICINE | Admitting: INTERNAL MEDICINE
Payer: COMMERCIAL

## 2020-08-03 ENCOUNTER — APPOINTMENT (OUTPATIENT)
Dept: CT IMAGING | Age: 30
DRG: 364 | End: 2020-08-03
Attending: EMERGENCY MEDICINE
Payer: COMMERCIAL

## 2020-08-03 DIAGNOSIS — N39.0 URINARY TRACT INFECTION WITHOUT HEMATURIA, SITE UNSPECIFIED: ICD-10-CM

## 2020-08-03 DIAGNOSIS — L05.01 PILONIDAL ABSCESS: Primary | ICD-10-CM

## 2020-08-03 DIAGNOSIS — U07.1 COVID-19: ICD-10-CM

## 2020-08-03 DIAGNOSIS — L02.91 ABSCESS: ICD-10-CM

## 2020-08-03 LAB
ANION GAP SERPL CALC-SCNC: 6 MMOL/L (ref 3–18)
BASOPHILS # BLD: 0 K/UL (ref 0–0.1)
BASOPHILS NFR BLD: 0 % (ref 0–2)
BUN SERPL-MCNC: 8 MG/DL (ref 7–18)
BUN/CREAT SERPL: 9 (ref 12–20)
CALCIUM SERPL-MCNC: 8.9 MG/DL (ref 8.5–10.1)
CHLORIDE SERPL-SCNC: 98 MMOL/L (ref 100–111)
CO2 SERPL-SCNC: 27 MMOL/L (ref 21–32)
CREAT SERPL-MCNC: 0.88 MG/DL (ref 0.6–1.3)
DIFFERENTIAL METHOD BLD: ABNORMAL
EOSINOPHIL # BLD: 0.1 K/UL (ref 0–0.4)
EOSINOPHIL NFR BLD: 1 % (ref 0–5)
ERYTHROCYTE [DISTWIDTH] IN BLOOD BY AUTOMATED COUNT: 12.6 % (ref 11.6–14.5)
GLUCOSE SERPL-MCNC: 113 MG/DL (ref 74–99)
HCG SERPL QL: NEGATIVE
HCT VFR BLD AUTO: 35.4 % (ref 35–45)
HGB BLD-MCNC: 12.1 G/DL (ref 12–16)
LACTATE BLD-SCNC: 1.21 MMOL/L (ref 0.4–2)
LYMPHOCYTES # BLD: 1.1 K/UL (ref 0.9–3.6)
LYMPHOCYTES NFR BLD: 10 % (ref 21–52)
MCH RBC QN AUTO: 29.7 PG (ref 24–34)
MCHC RBC AUTO-ENTMCNC: 34.2 G/DL (ref 31–37)
MCV RBC AUTO: 86.8 FL (ref 74–97)
MONOCYTES # BLD: 1.3 K/UL (ref 0.05–1.2)
MONOCYTES NFR BLD: 12 % (ref 3–10)
NEUTS SEG # BLD: 8.7 K/UL (ref 1.8–8)
NEUTS SEG NFR BLD: 77 % (ref 40–73)
PLATELET # BLD AUTO: 278 K/UL (ref 135–420)
PMV BLD AUTO: 9.5 FL (ref 9.2–11.8)
POTASSIUM SERPL-SCNC: 3.6 MMOL/L (ref 3.5–5.5)
RBC # BLD AUTO: 4.08 M/UL (ref 4.2–5.3)
SODIUM SERPL-SCNC: 131 MMOL/L (ref 136–145)
WBC # BLD AUTO: 11.2 K/UL (ref 4.6–13.2)

## 2020-08-03 PROCEDURE — 72193 CT PELVIS W/DYE: CPT

## 2020-08-03 PROCEDURE — 96375 TX/PRO/DX INJ NEW DRUG ADDON: CPT

## 2020-08-03 PROCEDURE — 83605 ASSAY OF LACTIC ACID: CPT

## 2020-08-03 PROCEDURE — 84703 CHORIONIC GONADOTROPIN ASSAY: CPT

## 2020-08-03 PROCEDURE — 74011250637 HC RX REV CODE- 250/637: Performed by: EMERGENCY MEDICINE

## 2020-08-03 PROCEDURE — 74011000250 HC RX REV CODE- 250: Performed by: EMERGENCY MEDICINE

## 2020-08-03 PROCEDURE — 80048 BASIC METABOLIC PNL TOTAL CA: CPT

## 2020-08-03 PROCEDURE — 85025 COMPLETE CBC W/AUTO DIFF WBC: CPT

## 2020-08-03 PROCEDURE — 75810000462 HC INC/DRN PILONIDAL CYST SIMPLE LVL 1 5051

## 2020-08-03 PROCEDURE — 74011250636 HC RX REV CODE- 250/636: Performed by: EMERGENCY MEDICINE

## 2020-08-03 PROCEDURE — 99285 EMERGENCY DEPT VISIT HI MDM: CPT

## 2020-08-03 RX ORDER — ACETAMINOPHEN 325 MG/1
975 TABLET ORAL
Status: COMPLETED | OUTPATIENT
Start: 2020-08-03 | End: 2020-08-03

## 2020-08-03 RX ORDER — ONDANSETRON 2 MG/ML
4 INJECTION INTRAMUSCULAR; INTRAVENOUS
Status: COMPLETED | OUTPATIENT
Start: 2020-08-03 | End: 2020-08-03

## 2020-08-03 RX ORDER — MORPHINE SULFATE 4 MG/ML
4 INJECTION, SOLUTION INTRAMUSCULAR; INTRAVENOUS
Status: COMPLETED | OUTPATIENT
Start: 2020-08-03 | End: 2020-08-03

## 2020-08-03 RX ORDER — LIDOCAINE HYDROCHLORIDE 10 MG/ML
10 INJECTION, SOLUTION EPIDURAL; INFILTRATION; INTRACAUDAL; PERINEURAL ONCE
Status: COMPLETED | OUTPATIENT
Start: 2020-08-03 | End: 2020-08-03

## 2020-08-03 RX ADMIN — ACETAMINOPHEN 975 MG: 325 TABLET ORAL at 23:19

## 2020-08-03 RX ADMIN — MORPHINE SULFATE 4 MG: 4 INJECTION, SOLUTION INTRAMUSCULAR; INTRAVENOUS at 22:41

## 2020-08-03 RX ADMIN — ONDANSETRON 4 MG: 2 INJECTION INTRAMUSCULAR; INTRAVENOUS at 22:41

## 2020-08-03 RX ADMIN — LIDOCAINE HYDROCHLORIDE 10 ML: 10 INJECTION, SOLUTION EPIDURAL; INFILTRATION; INTRACAUDAL; PERINEURAL at 22:41

## 2020-08-04 ENCOUNTER — ANESTHESIA (OUTPATIENT)
Dept: SURGERY | Age: 30
DRG: 364 | End: 2020-08-04
Payer: COMMERCIAL

## 2020-08-04 ENCOUNTER — APPOINTMENT (OUTPATIENT)
Dept: GENERAL RADIOLOGY | Age: 30
DRG: 364 | End: 2020-08-04
Attending: EMERGENCY MEDICINE
Payer: COMMERCIAL

## 2020-08-04 ENCOUNTER — ANESTHESIA EVENT (OUTPATIENT)
Dept: SURGERY | Age: 30
DRG: 364 | End: 2020-08-04
Payer: COMMERCIAL

## 2020-08-04 PROBLEM — K61.1 PERI-RECTAL ABSCESS: Status: ACTIVE | Noted: 2020-08-04

## 2020-08-04 LAB
APPEARANCE UR: ABNORMAL
APTT PPP: 30.5 SEC (ref 23–36.4)
BACTERIA URNS QL MICRO: ABNORMAL /HPF
BILIRUB UR QL: NEGATIVE
COLOR UR: YELLOW
COVID-19 RAPID TEST, COVR: DETECTED
EPITH CASTS URNS QL MICRO: ABNORMAL /LPF (ref 0–5)
GLUCOSE UR STRIP.AUTO-MCNC: NEGATIVE MG/DL
HGB UR QL STRIP: NEGATIVE
INR PPP: 1.2 (ref 0.8–1.2)
KETONES UR QL STRIP.AUTO: 15 MG/DL
LEUKOCYTE ESTERASE UR QL STRIP.AUTO: ABNORMAL
NITRITE UR QL STRIP.AUTO: NEGATIVE
PH UR STRIP: 5.5 [PH] (ref 5–8)
PROT UR STRIP-MCNC: ABNORMAL MG/DL
PROTHROMBIN TIME: 14.5 SEC (ref 11.5–15.2)
RBC #/AREA URNS HPF: NEGATIVE /HPF (ref 0–5)
SOURCE, COVRS: ABNORMAL
SP GR UR REFRACTOMETRY: >1.03 (ref 1–1.03)
TRICHOMONAS UR QL MICRO: ABNORMAL
UROBILINOGEN UR QL STRIP.AUTO: 1 EU/DL (ref 0.2–1)
WBC URNS QL MICRO: ABNORMAL /HPF (ref 0–4)

## 2020-08-04 PROCEDURE — 71045 X-RAY EXAM CHEST 1 VIEW: CPT

## 2020-08-04 PROCEDURE — 77030012422 HC DRN WND COVD -A: Performed by: COLON & RECTAL SURGERY

## 2020-08-04 PROCEDURE — 87185 SC STD ENZYME DETCJ PER NZM: CPT

## 2020-08-04 PROCEDURE — 74011250636 HC RX REV CODE- 250/636: Performed by: NURSE ANESTHETIST, CERTIFIED REGISTERED

## 2020-08-04 PROCEDURE — 74011000250 HC RX REV CODE- 250: Performed by: NURSE ANESTHETIST, CERTIFIED REGISTERED

## 2020-08-04 PROCEDURE — 87075 CULTR BACTERIA EXCEPT BLOOD: CPT

## 2020-08-04 PROCEDURE — 77030002996 HC SUT SLK J&J -A: Performed by: COLON & RECTAL SURGERY

## 2020-08-04 PROCEDURE — 81001 URINALYSIS AUTO W/SCOPE: CPT

## 2020-08-04 PROCEDURE — 96375 TX/PRO/DX INJ NEW DRUG ADDON: CPT

## 2020-08-04 PROCEDURE — 65270000029 HC RM PRIVATE

## 2020-08-04 PROCEDURE — 96365 THER/PROPH/DIAG IV INF INIT: CPT

## 2020-08-04 PROCEDURE — 87040 BLOOD CULTURE FOR BACTERIA: CPT

## 2020-08-04 PROCEDURE — 76010000153 HC OR TIME 1.5 TO 2 HR: Performed by: COLON & RECTAL SURGERY

## 2020-08-04 PROCEDURE — 87635 SARS-COV-2 COVID-19 AMP PRB: CPT

## 2020-08-04 PROCEDURE — 74011000258 HC RX REV CODE- 258: Performed by: EMERGENCY MEDICINE

## 2020-08-04 PROCEDURE — 74011636320 HC RX REV CODE- 636/320: Performed by: EMERGENCY MEDICINE

## 2020-08-04 PROCEDURE — 74011250636 HC RX REV CODE- 250/636: Performed by: EMERGENCY MEDICINE

## 2020-08-04 PROCEDURE — 74011250636 HC RX REV CODE- 250/636: Performed by: COLON & RECTAL SURGERY

## 2020-08-04 PROCEDURE — 77030031139 HC SUT VCRL2 J&J -A: Performed by: COLON & RECTAL SURGERY

## 2020-08-04 PROCEDURE — 74011000258 HC RX REV CODE- 258: Performed by: NURSE ANESTHETIST, CERTIFIED REGISTERED

## 2020-08-04 PROCEDURE — 85730 THROMBOPLASTIN TIME PARTIAL: CPT

## 2020-08-04 PROCEDURE — 76060000034 HC ANESTHESIA 1.5 TO 2 HR: Performed by: COLON & RECTAL SURGERY

## 2020-08-04 PROCEDURE — 74011250636 HC RX REV CODE- 250/636: Performed by: INTERNAL MEDICINE

## 2020-08-04 PROCEDURE — 87205 SMEAR GRAM STAIN: CPT

## 2020-08-04 PROCEDURE — 85610 PROTHROMBIN TIME: CPT

## 2020-08-04 RX ORDER — HEPARIN SODIUM 5000 [USP'U]/ML
5000 INJECTION, SOLUTION INTRAVENOUS; SUBCUTANEOUS EVERY 8 HOURS
Status: DISCONTINUED | OUTPATIENT
Start: 2020-08-04 | End: 2020-08-06 | Stop reason: HOSPADM

## 2020-08-04 RX ORDER — MORPHINE SULFATE 2 MG/ML
2 INJECTION, SOLUTION INTRAMUSCULAR; INTRAVENOUS
Status: DISCONTINUED | OUTPATIENT
Start: 2020-08-04 | End: 2020-08-06

## 2020-08-04 RX ORDER — SODIUM CHLORIDE 900 MG/100ML
INJECTION INTRAVENOUS
Status: DISPENSED
Start: 2020-08-04 | End: 2020-08-04

## 2020-08-04 RX ORDER — PIPERACILLIN SODIUM, TAZOBACTAM SODIUM 4; .5 G/20ML; G/20ML
INJECTION, POWDER, LYOPHILIZED, FOR SOLUTION INTRAVENOUS
Status: DISPENSED
Start: 2020-08-04 | End: 2020-08-04

## 2020-08-04 RX ORDER — MORPHINE SULFATE 2 MG/ML
4 INJECTION, SOLUTION INTRAMUSCULAR; INTRAVENOUS ONCE
Status: DISCONTINUED | OUTPATIENT
Start: 2020-08-04 | End: 2020-08-04

## 2020-08-04 RX ORDER — LIDOCAINE HYDROCHLORIDE 10 MG/ML
INJECTION, SOLUTION EPIDURAL; INFILTRATION; INTRACAUDAL; PERINEURAL
Status: COMPLETED | OUTPATIENT
Start: 2020-08-04 | End: 2020-08-04

## 2020-08-04 RX ORDER — SODIUM CHLORIDE 9 MG/ML
INJECTION, SOLUTION INTRAVENOUS
Status: DISCONTINUED | OUTPATIENT
Start: 2020-08-04 | End: 2020-08-04 | Stop reason: HOSPADM

## 2020-08-04 RX ORDER — MIDAZOLAM HYDROCHLORIDE 1 MG/ML
INJECTION, SOLUTION INTRAMUSCULAR; INTRAVENOUS AS NEEDED
Status: DISCONTINUED | OUTPATIENT
Start: 2020-08-04 | End: 2020-08-04 | Stop reason: HOSPADM

## 2020-08-04 RX ORDER — DEXTROSE MONOHYDRATE AND SODIUM CHLORIDE 5; .9 G/100ML; G/100ML
105 INJECTION, SOLUTION INTRAVENOUS CONTINUOUS
Status: DISCONTINUED | OUTPATIENT
Start: 2020-08-04 | End: 2020-08-06 | Stop reason: HOSPADM

## 2020-08-04 RX ORDER — BUPIVACAINE HYDROCHLORIDE 7.5 MG/ML
INJECTION, SOLUTION EPIDURAL; RETROBULBAR AS NEEDED
Status: DISCONTINUED | OUTPATIENT
Start: 2020-08-04 | End: 2020-08-04 | Stop reason: HOSPADM

## 2020-08-04 RX ORDER — ONDANSETRON 2 MG/ML
4 INJECTION INTRAMUSCULAR; INTRAVENOUS
Status: COMPLETED | OUTPATIENT
Start: 2020-08-04 | End: 2020-08-04

## 2020-08-04 RX ORDER — ONDANSETRON 2 MG/ML
4 INJECTION INTRAMUSCULAR; INTRAVENOUS
Status: DISCONTINUED | OUTPATIENT
Start: 2020-08-04 | End: 2020-08-06 | Stop reason: HOSPADM

## 2020-08-04 RX ORDER — BUPIVACAINE HYDROCHLORIDE 7.5 MG/ML
INJECTION, SOLUTION EPIDURAL; RETROBULBAR
Status: COMPLETED | OUTPATIENT
Start: 2020-08-04 | End: 2020-08-04

## 2020-08-04 RX ORDER — ONDANSETRON 2 MG/ML
INJECTION INTRAMUSCULAR; INTRAVENOUS
Status: DISPENSED
Start: 2020-08-04 | End: 2020-08-05

## 2020-08-04 RX ORDER — PROPOFOL 10 MG/ML
INJECTION, EMULSION INTRAVENOUS AS NEEDED
Status: DISCONTINUED | OUTPATIENT
Start: 2020-08-04 | End: 2020-08-04 | Stop reason: HOSPADM

## 2020-08-04 RX ADMIN — LIDOCAINE HYDROCHLORIDE 30 MG: 10 INJECTION, SOLUTION EPIDURAL; INFILTRATION; INTRACAUDAL; PERINEURAL at 20:00

## 2020-08-04 RX ADMIN — BUPIVACAINE HYDROCHLORIDE 9 MG: 7.5 INJECTION, SOLUTION EPIDURAL; RETROBULBAR at 20:00

## 2020-08-04 RX ADMIN — ONDANSETRON 4 MG: 2 INJECTION INTRAMUSCULAR; INTRAVENOUS at 13:22

## 2020-08-04 RX ADMIN — PIPERACILLIN AND TAZOBACTAM 3.38 G: 3; .375 INJECTION, POWDER, LYOPHILIZED, FOR SOLUTION INTRAVENOUS at 02:48

## 2020-08-04 RX ADMIN — HEPARIN SODIUM 5000 UNITS: 5000 INJECTION INTRAVENOUS; SUBCUTANEOUS at 23:02

## 2020-08-04 RX ADMIN — MIDAZOLAM HYDROCHLORIDE 2 MG: 2 INJECTION, SOLUTION INTRAMUSCULAR; INTRAVENOUS at 20:05

## 2020-08-04 RX ADMIN — PIPERACILLIN AND TAZOBACTAM 3.38 G: 3; .375 INJECTION, POWDER, LYOPHILIZED, FOR SOLUTION INTRAVENOUS at 07:30

## 2020-08-04 RX ADMIN — PIPERACILLIN SODIUM AND TAZOBACTAM SODIUM 3.38 G: 3; .375 INJECTION, POWDER, LYOPHILIZED, FOR SOLUTION INTRAVENOUS at 20:05

## 2020-08-04 RX ADMIN — DEXTROSE MONOHYDRATE AND SODIUM CHLORIDE 105 ML/HR: 5; .9 INJECTION, SOLUTION INTRAVENOUS at 02:41

## 2020-08-04 RX ADMIN — MORPHINE SULFATE 2 MG: 2 INJECTION, SOLUTION INTRAMUSCULAR; INTRAVENOUS at 13:22

## 2020-08-04 RX ADMIN — SODIUM CHLORIDE: 900 INJECTION, SOLUTION INTRAVENOUS at 19:44

## 2020-08-04 RX ADMIN — PIPERACILLIN AND TAZOBACTAM 3.38 G: 3; .375 INJECTION, POWDER, LYOPHILIZED, FOR SOLUTION INTRAVENOUS at 14:05

## 2020-08-04 RX ADMIN — IOPAMIDOL 100 ML: 612 INJECTION, SOLUTION INTRAVENOUS at 00:36

## 2020-08-04 RX ADMIN — PROPOFOL 30 MG: 10 INJECTION, EMULSION INTRAVENOUS at 20:14

## 2020-08-04 RX ADMIN — MORPHINE SULFATE 2 MG: 2 INJECTION, SOLUTION INTRAMUSCULAR; INTRAVENOUS at 23:02

## 2020-08-04 NOTE — ED NOTES
I&D done by Dr. Karyn Hernandez with large amount of pus from site.  Pt awaiting CT and v/u she is likely to be admitted to hospital.

## 2020-08-04 NOTE — ED PROVIDER NOTES
Pt c/o abscess mid upper buttocks, x 5-6 days, says h/o same in past.  Seen at  3 days ago, says placed on abx but no drainage done. No abd pain. Mild nausea, no vomiting. Fever today, subjective. No drainage. Not pregnant per pt. Tried percocet she received from , not helping much. Says h/o + covid test 2-3 weeks ago, had it done bc was having a child's birthday party and had noted decreased taste. Past Medical History:   Diagnosis Date    Miscarriage        Past Surgical History:   Procedure Laterality Date    HX DILATION AND CURETTAGE  2013         No family history on file.     Social History     Socioeconomic History    Marital status: SINGLE     Spouse name: Not on file    Number of children: Not on file    Years of education: Not on file    Highest education level: Not on file   Occupational History    Not on file   Social Needs    Financial resource strain: Not on file    Food insecurity     Worry: Not on file     Inability: Not on file    Transportation needs     Medical: Not on file     Non-medical: Not on file   Tobacco Use    Smoking status: Current Every Day Smoker    Smokeless tobacco: Never Used   Substance and Sexual Activity    Alcohol use: No    Drug use: No    Sexual activity: Yes     Partners: Male     Birth control/protection: Condom   Lifestyle    Physical activity     Days per week: Not on file     Minutes per session: Not on file    Stress: Not on file   Relationships    Social connections     Talks on phone: Not on file     Gets together: Not on file     Attends Advent service: Not on file     Active member of club or organization: Not on file     Attends meetings of clubs or organizations: Not on file     Relationship status: Not on file    Intimate partner violence     Fear of current or ex partner: Not on file     Emotionally abused: Not on file     Physically abused: Not on file     Forced sexual activity: Not on file   Other Topics Concern    Not on file   Social History Narrative    Not on file         ALLERGIES: Patient has no known allergies. Review of Systems   Constitutional: Negative for diaphoresis and unexpected weight change. HENT: Negative for congestion. Respiratory: Negative for cough and shortness of breath. Cardiovascular: Negative for chest pain. Gastrointestinal: Negative for abdominal pain and vomiting. Musculoskeletal: Negative for back pain. Skin: Positive for rash. Neurological: Negative for light-headedness. All other systems reviewed and are negative. Vitals:    08/03/20 2004 08/04/20 0148   BP: 119/50 109/88   Pulse: (!) 109 88   Resp: 12 16   Temp: (!) 100.7 °F (38.2 °C) 98.3 °F (36.8 °C)   SpO2: 99% 98%   Weight: 65.8 kg (145 lb)    Height: 5' 6\" (1.676 m)             Physical Exam  Vitals signs and nursing note reviewed. Constitutional:       Appearance: She is well-developed. She is not diaphoretic. HENT:      Head: Normocephalic and atraumatic. Eyes:      Pupils: Pupils are equal, round, and reactive to light. Neck:      Musculoskeletal: Normal range of motion. Cardiovascular:      Rate and Rhythm: Regular rhythm. Tachycardia present. Heart sounds: No murmur. Pulmonary:      Effort: Pulmonary effort is normal.      Breath sounds: No wheezing. Abdominal:      Palpations: Abdomen is soft. Tenderness: There is no abdominal tenderness. Musculoskeletal:         General: No tenderness. Skin:     General: Skin is dry. Capillary Refill: Capillary refill takes less than 2 seconds. Findings: Rash (+ 4x4 cm area of mid upper and left upper medial buttocks swelling/erythema/induration/fluctuance. no drainage) present. Neurological:      Mental Status: She is alert and oriented to person, place, and time.           [facility]       I&D Abcess Complex    Date/Time: 8/3/2020 11:22 PM  Performed by: Geovanna Darby MD  Authorized by: Geovanna Darby MD     Consent:     Consent obtained:  Written    Consent given by:  Patient    Risks discussed:  Incomplete drainage, infection, bleeding, damage to other organs and pain    Alternatives discussed:  No treatment, delayed treatment, alternative treatment and observation  Location:     Type:  Pilonidal cyst    Size:  4x4    Location:  Anogenital    Anogenital location:  Pilonidal  Anesthesia (see MAR for exact dosages): Anesthesia method:  Local infiltration    Local anesthetic:  Lidocaine 1% w/o epi  Procedure type:     Complexity:  Complex  Procedure details:     Incision types:  Single straight    Incision depth:  Submucosal    Scalpel blade:  11    Wound management:  Probed and deloculated    Drainage:  Purulent    Drainage amount:  Copious    Wound treatment:  Wound left open    Packing materials:  1/4 in gauze  Post-procedure details:     Patient tolerance of procedure:   Tolerated well, no immediate complications          Vitals:  Patient Vitals for the past 12 hrs:   Temp Pulse Resp BP SpO2   08/04/20 0148 98.3 °F (36.8 °C) 88 16 109/88 98 %   08/03/20 2004 (!) 100.7 °F (38.2 °C) (!) 109 12 119/50 99 %         Medications ordered:   Medications   dextrose 5% and 0.9% NaCl infusion (105 mL/hr IntraVENous New Bag 8/4/20 0241)   piperacillin-tazobactam (ZOSYN) 3.375 g in 0.9% sodium chloride (MBP/ADV) 100 mL MBP (0 g IntraVENous IV Completed 8/4/20 0318)   piperacillin-tazobactam (ZOSYN) 4.5 gram injection (has no administration in time range)   0.9% sodium chloride (MBP/ADV) infusion (has no administration in time range)   morphine injection 4 mg (4 mg IntraVENous Given 8/3/20 2241)   ondansetron (ZOFRAN) injection 4 mg (4 mg IntraVENous Given 8/3/20 2241)   lidocaine (PF) (XYLOCAINE) 10 mg/mL (1 %) injection 10 mL (10 mL SubCUTAneous Given by Provider 8/3/20 2241)   acetaminophen (TYLENOL) tablet 975 mg (975 mg Oral Given 8/3/20 2319)   iopamidoL (ISOVUE 300) 61 % contrast injection 100 mL (100 mL IntraVENous Given 8/4/20 0036) Lab findings:  Recent Results (from the past 12 hour(s))   CBC WITH AUTOMATED DIFF    Collection Time: 08/03/20 10:38 PM   Result Value Ref Range    WBC 11.2 4.6 - 13.2 K/uL    RBC 4.08 (L) 4.20 - 5.30 M/uL    HGB 12.1 12.0 - 16.0 g/dL    HCT 35.4 35.0 - 45.0 %    MCV 86.8 74.0 - 97.0 FL    MCH 29.7 24.0 - 34.0 PG    MCHC 34.2 31.0 - 37.0 g/dL    RDW 12.6 11.6 - 14.5 %    PLATELET 912 408 - 067 K/uL    MPV 9.5 9.2 - 11.8 FL    NEUTROPHILS 77 (H) 40 - 73 %    LYMPHOCYTES 10 (L) 21 - 52 %    MONOCYTES 12 (H) 3 - 10 %    EOSINOPHILS 1 0 - 5 %    BASOPHILS 0 0 - 2 %    ABS. NEUTROPHILS 8.7 (H) 1.8 - 8.0 K/UL    ABS. LYMPHOCYTES 1.1 0.9 - 3.6 K/UL    ABS. MONOCYTES 1.3 (H) 0.05 - 1.2 K/UL    ABS. EOSINOPHILS 0.1 0.0 - 0.4 K/UL    ABS.  BASOPHILS 0.0 0.0 - 0.1 K/UL    DF AUTOMATED     METABOLIC PANEL, BASIC    Collection Time: 08/03/20 10:38 PM   Result Value Ref Range    Sodium 131 (L) 136 - 145 mmol/L    Potassium 3.6 3.5 - 5.5 mmol/L    Chloride 98 (L) 100 - 111 mmol/L    CO2 27 21 - 32 mmol/L    Anion gap 6 3.0 - 18 mmol/L    Glucose 113 (H) 74 - 99 mg/dL    BUN 8 7.0 - 18 MG/DL    Creatinine 0.88 0.6 - 1.3 MG/DL    BUN/Creatinine ratio 9 (L) 12 - 20      GFR est AA >60 >60 ml/min/1.73m2    GFR est non-AA >60 >60 ml/min/1.73m2    Calcium 8.9 8.5 - 10.1 MG/DL   POC LACTIC ACID    Collection Time: 08/03/20 10:38 PM   Result Value Ref Range    Lactic Acid (POC) 1.21 0.40 - 2.00 mmol/L   HCG QL SERUM    Collection Time: 08/03/20 10:38 PM   Result Value Ref Range    HCG, Ql. Negative NEG     URINALYSIS W/ RFLX MICROSCOPIC    Collection Time: 08/04/20  2:22 AM   Result Value Ref Range    Color YELLOW      Appearance TURBID      Specific gravity >1.030 (H) 1.005 - 1.030    pH (UA) 5.5 5.0 - 8.0      Protein TRACE (A) NEG mg/dL    Glucose Negative NEG mg/dL    Ketone 15 (A) NEG mg/dL    Bilirubin Negative NEG      Blood Negative NEG      Urobilinogen 1.0 0.2 - 1.0 EU/dL    Nitrites Negative NEG      Leukocyte Esterase MODERATE (A) NEG     URINE MICROSCOPIC ONLY    Collection Time: 08/04/20  2:22 AM   Result Value Ref Range    WBC 4 to 10 0 - 4 /hpf    RBC Negative 0 - 5 /hpf    Epithelial cells 4+ 0 - 5 /lpf    Bacteria 2+ (A) NEG /hpf    Trichomonas 2+ (A) NEG   SARS-COV-2    Collection Time: 08/04/20  2:22 AM   Result Value Ref Range    SARS-CoV-2 PENDING     Specimen source Nasopharyngeal      COVID-19 rapid test Detected (A) NOTD             X-Ray, CT or other radiology findings or impressions:  CT PELV W CONT   Final Result   IMPRESSION:   1. Subcutaneous abscess along the posterior gluteal cleft. This slightly   contacts the coccygeal region without clear evidence of a fistulous tract into   the spinal canal or presacral space. 2. Mild bilateral inguinal adenopathy. Follow-up recommended. Progress notes, Consult notes or additional Procedure notes:   1:54 AM ct results noted, d/w dr Andry Michele, to admit, req covid results first, if remains pos req hosp to admit and he will consult, if neg will admit primarily  5:43 AM covid result called as positive, paged hospitalist  6:00 AM  dr Karina Sylvester, [facility] approp to consult, not admit as primary  6:10 AM D/w dr Andry Michele, [facility] will d/w dr Karina Sylvester, approp for medicine to admit  6:20 AM d/w dr Karina Sylvester, [facility] to d/w medicine chief this am, plans to consult  6:28 AM paged administration to d/w admit    Diagnosis:   1. Pilonidal abscess    2. Urinary tract infection without hematuria, site unspecified    3. COVID-19        Disposition: admit    Follow-up Information    None          Patient's Medications   Start Taking    No medications on file   Continue Taking    CLINDAMYCIN (CLEOCIN) 300 MG CAPSULE    Take 1 Cap by mouth four (4) times daily for 7 days. OXYCODONE-ACETAMINOPHEN (PERCOCET) 5-325 MG PER TABLET    Take 1 Tab by mouth every six (6) hours as needed for Pain for up to 5 days. Max Daily Amount: 4 Tabs.    These Medications have changed    No medications on file   Stop Taking    No medications on file

## 2020-08-04 NOTE — ED NOTES
[facility], patient will transport to ER to be registered and then go to the floor to room 354

## 2020-08-04 NOTE — ROUTINE PROCESS
Verbal shift change report given to Mary Cevallos (oncoming nurse) by Cristy Hughes (offgoing nurse). Report included the following information SBAR, Kardex, MAR, Recent Results, Med Rec Status and Alarm Parameters .

## 2020-08-04 NOTE — ED TRIAGE NOTES
Patient presents to ER for C/o cyst to [facility], states this is the 4th time this has happened.

## 2020-08-04 NOTE — ED NOTES
Spoke with nursing supervisor, Brent Gamino [place] [address]. Patient has given a room assignment but will still need to go to ER to be registered first.  Need to call and give report to 2 Shavon Hebert. Called [facility], receiving nurse, Ana Singleton, has stepped off the floor and will call me back.

## 2020-08-04 NOTE — ED NOTES
Received report and assumed care of patient. Greeted patient and introduced myself as their primary nurse. Encouraged Patient to voice any concerns. Patient updated on all care, including any pending orders or procedures. Call bell with reach. Awaiting to hear back from [facility] regarding admission for surgery.

## 2020-08-04 NOTE — ROUTINE PROCESS
TRANSFER - OUT REPORT: 
 
Verbal report given to Jalen (name) on Esequiel  being transferred to [employer] room 354 (unit) for routine progression of care Report consisted of patients Situation, Background, Assessment and  
Recommendations(SBAR). Information from the following report(s) ED Summary was reviewed with the receiving nurse. Lines:  
Peripheral IV 08/04/20 Antecubital (Active) Site Assessment Clean, dry, & intact 08/04/20 0240 Phlebitis Assessment 0 08/04/20 0240 Infiltration Assessment 0 08/04/20 0240 Dressing Status Clean, dry, & intact 08/04/20 0240 Dressing Type Transparent;Tape 08/04/20 0240 Hub Color/Line Status Pink;Flushed;Patent 08/04/20 0240 Action Taken Blood drawn 08/04/20 0240 Peripheral IV 08/04/20 Right Hand (Active) Site Assessment Clean, dry, & intact 08/04/20 0246 Phlebitis Assessment 0 08/04/20 0246 Infiltration Assessment 0 08/04/20 0246 Hub Color/Line Status Blue;Flushed;Patent 08/04/20 0246 Action Taken Blood drawn 08/04/20 0246 Opportunity for questions and clarification was provided.

## 2020-08-04 NOTE — ED NOTES
Attempted to call ER to give report again. Was transferred to nursing supervisor. They don't understand why patient is being sent to ER. I told them that's what the OR told me to do. Nursing supervisor states she had to investigate and would call me back.

## 2020-08-04 NOTE — ED NOTES
Spoke to Pre Op and OR. Since patient is Covid positive, she will transfer to ER instead of going to preop. Attempted to call report to ER and held for several minutes. Will try again.

## 2020-08-04 NOTE — CONSULTS
HPI: Jennifer Villatoro is a 27 y.o. female presenting with chief complain of pilonidal abscess. The patient has had this at least 4 prior times. Recently she was sent home on antibiotics and return where the abscess was incised in the emergency room. Follow-up imaging showed persistent fluid and she was admitted for incision and drainage. The patient has had prior incision and drainage procedures for this. She complains of pain in the intergluteal region. It is sharp, constant and severe. The patient was also positive for COVID. She is essentially asymptomatic, though she noted some decrease sense of taste when she was first tested positive. Follow-up test in the emergency room last night was also positive. Although she does not complain of any symptoms there is the possibility of patchy infiltrates on her chest x-ray. Past Medical History:   Diagnosis Date    Miscarriage        Past Surgical History:   Procedure Laterality Date    HX DILATION AND CURETTAGE  2013       Family medical history negative for soft tissue disorders    Social History     Socioeconomic History    Marital status: SINGLE     Spouse name: Not on file    Number of children: Not on file    Years of education: Not on file    Highest education level: Not on file   Tobacco Use    Smoking status: Current Every Day Smoker    Smokeless tobacco: Never Used   Substance and Sexual Activity    Alcohol use: No    Drug use: No    Sexual activity: Yes     Partners: Male     Birth control/protection: Condom       Review of Systems -aside from the above all others are negative        No Known Allergies    Vitals:    08/04/20 1153 08/04/20 1155 08/04/20 1202 08/04/20 1629   BP: 103/74   104/63   Pulse:    77   Resp:    16   Temp:   98.6 °F (37 °C) 99 °F (37.2 °C)   SpO2:  99%  100%   Weight:       Height:       LMP: 07/14/2020       Physical Exam  Constitutional:       Appearance: She is well-developed.    HENT:      Head: Normocephalic and atraumatic. Eyes:      Conjunctiva/sclera: Conjunctivae normal.   Abdominal:      General: There is no distension. Palpations: Abdomen is soft. Tenderness: There is no abdominal tenderness. Musculoskeletal: Normal range of motion. Lymphadenopathy:      Cervical: No cervical adenopathy. Skin:     General: Skin is warm and dry. Findings: No rash. Neurological:      Sensory: No sensory deficit. Psychiatric:         Speech: Speech normal.     Intergluteal region is indurated and erythematous    CMP:   Lab Results   Component Value Date/Time     (L) 08/03/2020 10:38 PM    K 3.6 08/03/2020 10:38 PM    CL 98 (L) 08/03/2020 10:38 PM    CO2 27 08/03/2020 10:38 PM    AGAP 6 08/03/2020 10:38 PM     (H) 08/03/2020 10:38 PM    BUN 8 08/03/2020 10:38 PM    CREA 0.88 08/03/2020 10:38 PM    GFRAA >60 08/03/2020 10:38 PM    GFRNA >60 08/03/2020 10:38 PM    CA 8.9 08/03/2020 10:38 PM     CBC:   Lab Results   Component Value Date/Time    WBC 11.2 08/03/2020 10:38 PM    HGB 12.1 08/03/2020 10:38 PM    HCT 35.4 08/03/2020 10:38 PM     08/03/2020 10:38 PM     COVID test was positive  U/A ? Positive, looks contaminated    CT of the pelvis shows residual abscess in the intergluteal cleft    Assessment / Plan    Pilonidal abscess  2 OR for I&D  Zosyn  N.p.o., IV fluids  Plan will be for spinal anesthetic to minimize progression of her COVID    COVID management and observation per medical team, may require follow-up chest imaging  May require repeat urinalysis as the study in the ED looked contaminated    The diagnoses and plan were discussed with the patient. All questions answered. Plan of care agreed to by all concerned.

## 2020-08-04 NOTE — ED NOTES
Spoke with Dr. Margie Briseno at direct number [phone] regarding patient status. Verbal orders received. Plan of care to take patient to OR at approx 1700 or 1800. Per Dr. Hoang Todd understanding, patient will be admitted to [facility] by hospital service.

## 2020-08-04 NOTE — PROGRESS NOTES
27years old presented with large gluteal abscess that could not be completely drained in ED and will need OR drainage. Patient does not have any cough, shortness of breath or hypoxia despite screening positive for COVID. He will not need any specific treatment for COVID and such cases are typically sent home for outpatient monitoring and isolation. Patient is appropriate for surgical admission for his primary presenting diagnosis. We are happy to consult if needed. Dr. Drew Quivers declining to admit patient at this point, saying that the patient may develop COVID related complication. It does not appear to me this is high likelihood, and will be happy to follow along in case it happens. I updated the ED physician to resolved admission with surgery or the medical director.

## 2020-08-04 NOTE — ED NOTES
Patient updated on status, OR time, and plan of care. Pain medication given per prn order patient's pain 4/10. Antiemetic given. Blood drawn for PT/PTT and sent to lab. Dressing with serosanguinous drainage, removed. Non adherent pad placed, 4x4's placed, and secured with paper tape. Patient told that family can drop off clothes per her comfort, but that she is NPO until surgery. Patient verbalizes understanding.

## 2020-08-04 NOTE — ED NOTES
Spoke with hospitalist on call at this time to get update on patient. Hospitalist declines to put orders in on patient because she has not seen that patient. Recommends current therapy, reorder of Morphine for pain if needed, page Dr. Issa Guo again for more information regarding OR. Current plan: ED MD updated on plan of care and phone call. Dr. Issa Guo paged again. If no response to second patient, plan to call nursing supervisor for notification and advice. [facility] ED charge nurse and manager updated.

## 2020-08-05 ENCOUNTER — APPOINTMENT (OUTPATIENT)
Dept: GENERAL RADIOLOGY | Age: 30
DRG: 364 | End: 2020-08-05
Attending: INTERNAL MEDICINE
Payer: COMMERCIAL

## 2020-08-05 PROBLEM — U07.1 COVID-19 VIRUS INFECTION: Status: ACTIVE | Noted: 2020-08-05

## 2020-08-05 PROBLEM — E87.1 HYPONATREMIA: Status: ACTIVE | Noted: 2020-08-05

## 2020-08-05 PROBLEM — E87.6 HYPOKALEMIA: Status: ACTIVE | Noted: 2020-08-05

## 2020-08-05 LAB
ANION GAP SERPL CALC-SCNC: 6 MMOL/L (ref 3–18)
BUN SERPL-MCNC: 8 MG/DL (ref 7–18)
BUN/CREAT SERPL: 9 (ref 12–20)
CALCIUM SERPL-MCNC: 8.5 MG/DL (ref 8.5–10.1)
CHLORIDE SERPL-SCNC: 102 MMOL/L (ref 100–111)
CO2 SERPL-SCNC: 28 MMOL/L (ref 21–32)
CREAT SERPL-MCNC: 0.88 MG/DL (ref 0.6–1.3)
ERYTHROCYTE [DISTWIDTH] IN BLOOD BY AUTOMATED COUNT: 13.1 % (ref 11.6–14.5)
GLUCOSE SERPL-MCNC: 92 MG/DL (ref 74–99)
HCT VFR BLD AUTO: 33.7 % (ref 35–45)
HGB BLD-MCNC: 11.5 G/DL (ref 12–16)
MAGNESIUM SERPL-MCNC: 2.2 MG/DL (ref 1.6–2.6)
MCH RBC QN AUTO: 29.6 PG (ref 24–34)
MCHC RBC AUTO-ENTMCNC: 34.1 G/DL (ref 31–37)
MCV RBC AUTO: 86.6 FL (ref 74–97)
PHOSPHATE SERPL-MCNC: 3.9 MG/DL (ref 2.5–4.9)
PLATELET # BLD AUTO: 256 K/UL (ref 135–420)
PMV BLD AUTO: 9.3 FL (ref 9.2–11.8)
POTASSIUM SERPL-SCNC: 3.3 MMOL/L (ref 3.5–5.5)
RBC # BLD AUTO: 3.89 M/UL (ref 4.2–5.3)
SODIUM SERPL-SCNC: 136 MMOL/L (ref 136–145)
WBC # BLD AUTO: 6.8 K/UL (ref 4.6–13.2)

## 2020-08-05 PROCEDURE — 71045 X-RAY EXAM CHEST 1 VIEW: CPT

## 2020-08-05 PROCEDURE — 36415 COLL VENOUS BLD VENIPUNCTURE: CPT

## 2020-08-05 PROCEDURE — 65270000029 HC RM PRIVATE

## 2020-08-05 PROCEDURE — 74011250636 HC RX REV CODE- 250/636: Performed by: COLON & RECTAL SURGERY

## 2020-08-05 PROCEDURE — 83735 ASSAY OF MAGNESIUM: CPT

## 2020-08-05 PROCEDURE — 74011250636 HC RX REV CODE- 250/636: Performed by: INTERNAL MEDICINE

## 2020-08-05 PROCEDURE — 80048 BASIC METABOLIC PNL TOTAL CA: CPT

## 2020-08-05 PROCEDURE — 84100 ASSAY OF PHOSPHORUS: CPT

## 2020-08-05 PROCEDURE — 85027 COMPLETE CBC AUTOMATED: CPT

## 2020-08-05 PROCEDURE — 74011000258 HC RX REV CODE- 258: Performed by: EMERGENCY MEDICINE

## 2020-08-05 PROCEDURE — 0J990ZZ DRAINAGE OF BUTTOCK SUBCUTANEOUS TISSUE AND FASCIA, OPEN APPROACH: ICD-10-PCS | Performed by: COLON & RECTAL SURGERY

## 2020-08-05 PROCEDURE — 74011250636 HC RX REV CODE- 250/636: Performed by: EMERGENCY MEDICINE

## 2020-08-05 PROCEDURE — 74011250637 HC RX REV CODE- 250/637: Performed by: INTERNAL MEDICINE

## 2020-08-05 RX ORDER — LANOLIN ALCOHOL/MO/W.PET/CERES
3 CREAM (GRAM) TOPICAL
Status: DISCONTINUED | OUTPATIENT
Start: 2020-08-05 | End: 2020-08-06 | Stop reason: HOSPADM

## 2020-08-05 RX ORDER — ALBUTEROL SULFATE 90 UG/1
1 AEROSOL, METERED RESPIRATORY (INHALATION)
Status: DISCONTINUED | OUTPATIENT
Start: 2020-08-05 | End: 2020-08-06 | Stop reason: HOSPADM

## 2020-08-05 RX ORDER — ACETAMINOPHEN 325 MG/1
650 TABLET ORAL
Status: DISCONTINUED | OUTPATIENT
Start: 2020-08-05 | End: 2020-08-06 | Stop reason: HOSPADM

## 2020-08-05 RX ORDER — ACETAMINOPHEN 650 MG/1
650 SUPPOSITORY RECTAL
Status: DISCONTINUED | OUTPATIENT
Start: 2020-08-05 | End: 2020-08-06 | Stop reason: HOSPADM

## 2020-08-05 RX ORDER — ASCORBIC ACID 250 MG
500 TABLET ORAL 2 TIMES DAILY
Status: DISCONTINUED | OUTPATIENT
Start: 2020-08-05 | End: 2020-08-06 | Stop reason: HOSPADM

## 2020-08-05 RX ORDER — ZINC SULFATE 50(220)MG
1 CAPSULE ORAL DAILY
Status: DISCONTINUED | OUTPATIENT
Start: 2020-08-05 | End: 2020-08-06 | Stop reason: HOSPADM

## 2020-08-05 RX ORDER — CHOLECALCIFEROL (VITAMIN D3) 125 MCG
5000 CAPSULE ORAL DAILY
Status: DISCONTINUED | OUTPATIENT
Start: 2020-08-05 | End: 2020-08-06 | Stop reason: HOSPADM

## 2020-08-05 RX ORDER — FAMOTIDINE 20 MG/1
20 TABLET, FILM COATED ORAL 2 TIMES DAILY
Status: DISCONTINUED | OUTPATIENT
Start: 2020-08-05 | End: 2020-08-06 | Stop reason: HOSPADM

## 2020-08-05 RX ADMIN — FAMOTIDINE 20 MG: 20 TABLET ORAL at 21:35

## 2020-08-05 RX ADMIN — Medication 5000 UNITS: at 11:45

## 2020-08-05 RX ADMIN — FAMOTIDINE 20 MG: 20 TABLET ORAL at 11:45

## 2020-08-05 RX ADMIN — PIPERACILLIN AND TAZOBACTAM 3.38 G: 3; .375 INJECTION, POWDER, LYOPHILIZED, FOR SOLUTION INTRAVENOUS at 14:23

## 2020-08-05 RX ADMIN — DEXTROSE MONOHYDRATE AND SODIUM CHLORIDE 105 ML/HR: 5; .9 INJECTION, SOLUTION INTRAVENOUS at 19:37

## 2020-08-05 RX ADMIN — HEPARIN SODIUM 5000 UNITS: 5000 INJECTION INTRAVENOUS; SUBCUTANEOUS at 12:26

## 2020-08-05 RX ADMIN — ZINC SULFATE 220 MG (50 MG) CAPSULE 1 CAPSULE: CAPSULE at 11:45

## 2020-08-05 RX ADMIN — HEPARIN SODIUM 5000 UNITS: 5000 INJECTION INTRAVENOUS; SUBCUTANEOUS at 21:34

## 2020-08-05 RX ADMIN — PIPERACILLIN AND TAZOBACTAM 3.38 G: 3; .375 INJECTION, POWDER, LYOPHILIZED, FOR SOLUTION INTRAVENOUS at 21:34

## 2020-08-05 RX ADMIN — Medication 500 MG: at 22:32

## 2020-08-05 RX ADMIN — AZITHROMYCIN MONOHYDRATE 500 MG: 500 INJECTION, POWDER, LYOPHILIZED, FOR SOLUTION INTRAVENOUS at 12:29

## 2020-08-05 RX ADMIN — PIPERACILLIN AND TAZOBACTAM 3.38 G: 3; .375 INJECTION, POWDER, LYOPHILIZED, FOR SOLUTION INTRAVENOUS at 08:08

## 2020-08-05 RX ADMIN — DEXTROSE MONOHYDRATE AND SODIUM CHLORIDE 105 ML/HR: 5; .9 INJECTION, SOLUTION INTRAVENOUS at 06:00

## 2020-08-05 RX ADMIN — MORPHINE SULFATE 2 MG: 2 INJECTION, SOLUTION INTRAMUSCULAR; INTRAVENOUS at 04:13

## 2020-08-05 RX ADMIN — Medication 500 MG: at 11:45

## 2020-08-05 RX ADMIN — HEPARIN SODIUM 5000 UNITS: 5000 INJECTION INTRAVENOUS; SUBCUTANEOUS at 06:00

## 2020-08-05 RX ADMIN — ONDANSETRON 4 MG: 2 INJECTION INTRAMUSCULAR; INTRAVENOUS at 12:26

## 2020-08-05 RX ADMIN — MELATONIN 3 MG: at 21:35

## 2020-08-05 RX ADMIN — PIPERACILLIN AND TAZOBACTAM 3.38 G: 3; .375 INJECTION, POWDER, LYOPHILIZED, FOR SOLUTION INTRAVENOUS at 02:17

## 2020-08-05 NOTE — ANESTHESIA PREPROCEDURE EVALUATION
Relevant Problems   No relevant active problems       Anesthetic History   No history of anesthetic complications            Review of Systems / Medical History  Patient summary reviewed and pertinent labs reviewed    Pulmonary  Within defined limits                 Neuro/Psych   Within defined limits           Cardiovascular                  Exercise tolerance: >4 METS     GI/Hepatic/Renal  Within defined limits              Endo/Other  Within defined limits           Other Findings              Physical Exam    Airway  Mallampati: II  TM Distance: 4 - 6 cm  Neck ROM: normal range of motion   Mouth opening: Normal     Cardiovascular  Regular rate and rhythm,  S1 and S2 normal,  no murmur, click, rub, or gallop             Dental  No notable dental hx       Pulmonary  Breath sounds clear to auscultation               Abdominal  GI exam deferred       Other Findings            Anesthetic Plan    ASA: 2  Anesthesia type: spinal            Anesthetic plan and risks discussed with: Patient

## 2020-08-05 NOTE — ROUTINE PROCESS
TRANSFER - OUT REPORT: 
 
Verbal report given to 3rd floor nurse on Esequiel  being transferred to [facility] for routine post - op Report consisted of patients Situation, Background, Assessment and  
Recommendations(SBAR). Information from the following report(s) SBAR was reviewed with the receiving nurse. Lines:  
Peripheral IV 08/04/20 Antecubital (Active) Site Assessment Clean, dry, & intact 08/04/20 0240 Phlebitis Assessment 0 08/04/20 0240 Infiltration Assessment 0 08/04/20 0240 Dressing Status Clean, dry, & intact 08/04/20 0240 Dressing Type Transparent;Tape 08/04/20 0240 Hub Color/Line Status Pink;Flushed;Patent 08/04/20 0240 Action Taken Blood drawn 08/04/20 0240 Peripheral IV 08/04/20 Right Hand (Active) Site Assessment Clean, dry, & intact 08/04/20 0246 Phlebitis Assessment 0 08/04/20 0246 Infiltration Assessment 0 08/04/20 0246 Hub Color/Line Status Blue;Flushed;Patent 08/04/20 0246 Action Taken Blood drawn 08/04/20 0246 Opportunity for questions and clarification was provided. Patient transported with: 
 Registered Nurse

## 2020-08-05 NOTE — PROGRESS NOTES
Problem: Falls - Risk of  Goal: *Absence of Falls  Description: Document Betsy Alves Fall Risk and appropriate interventions in the flowsheet. Outcome: Progressing Towards Goal  Note: Fall Risk Interventions:            Medication Interventions: Assess postural VS orthostatic hypotension, Patient to call before getting OOB, Teach patient to arise slowly                   Problem: Patient Education: Go to Patient Education Activity  Goal: Patient/Family Education  Outcome: Progressing Towards Goal    Problem: Airway Clearance - Ineffective  Goal: Achieve or maintain patent airway  Outcome: Progressing Towards Goal     Problem: Gas Exchange - Impaired  Goal: Absence of hypoxia  Outcome: Progressing Towards Goal  Goal: Promote optimal lung function  Outcome: Progressing Towards Goal     Problem: Breathing Pattern - Ineffective  Goal: Ability to achieve and maintain a regular respiratory rate  Outcome: Progressing Towards Goal     Problem:  Body Temperature -  Risk of, Imbalanced  Goal: Ability to maintain a body temperature within defined limits  Outcome: Progressing Towards Goal  Goal: Will regain or maintain usual level of consciousness  Outcome: Progressing Towards Goal  Goal: Complications related to the disease process, condition or treatment will be avoided or minimized  Outcome: Progressing Towards Goal     Problem: Isolation Precautions - Risk of Spread of Infection  Goal: Prevent transmission of infectious organism to others  Outcome: Progressing Towards Goal     Problem: Nutrition Deficits  Goal: Optimize nutrtional status  Outcome: Progressing Towards Goal     Problem: Risk for Fluid Volume Deficit  Goal: Maintain normal heart rhythm  Outcome: Progressing Towards Goal  Goal: Maintain absence of muscle cramping  Outcome: Progressing Towards Goal  Goal: Maintain normal serum potassium, sodium, calcium, phosphorus, and pH  Outcome: Progressing Towards Goal     Problem: Loneliness or Risk for Loneliness  Goal: Demonstrate positive use of time alone when socialization is not possible  Outcome: Progressing Towards Goal     Problem: Fatigue  Goal: Verbalize increase energy and improved vitality  Outcome: Progressing Towards Goal     Problem: Patient Education: Go to Patient Education Activity  Goal: Patient/Family Education  Outcome: Progressing Towards Goal

## 2020-08-05 NOTE — ANESTHESIA POSTPROCEDURE EVALUATION
Procedure(s):  INCISION AND DRAINAGE PILONIDAL CYST.    spinal, MAC    Anesthesia Post Evaluation      Multimodal analgesia: multimodal analgesia used between 6 hours prior to anesthesia start to PACU discharge  Patient location during evaluation: bedside  Patient participation: complete - patient participated  Level of consciousness: awake  Pain management: adequate  Airway patency: patent  Anesthetic complications: no  Cardiovascular status: stable  Respiratory status: acceptable  Hydration status: acceptable  Comments: Patient unable to wiggle her toes as yet  Post anesthesia nausea and vomiting:  controlled  Final Post Anesthesia Temperature Assessment:  Normothermia (36.0-37.5 degrees C)      INITIAL Post-op Vital signs: No vitals data found for the desired time range.

## 2020-08-05 NOTE — ROUTINE PROCESS
TRANSFER - IN REPORT: 
 
Verbal report received from floor nurse on [facility]  being received from  for ordered procedure Report consisted of patients Situation, Background, Assessment and  
Recommendations(SBAR). Information from the following report(s) SBAR was reviewed with the receiving nurse. Opportunity for questions and clarification was provided. Assessment completed upon patients arrival to unit and care assumed.

## 2020-08-05 NOTE — ROUTINE PROCESS
Bedside shift change report given to Anitra Neal RN (oncoming nurse) by Clearnce Soulier, RN (offgoing nurse). Report included the following information SBAR, Kardex, Intake/Output, MAR, Recent Results and Med Rec Status, and procedure summary.

## 2020-08-05 NOTE — H&P
Admission History and Physical    Lonza Harman is a 27 y.o. female who is being admitted. Chief Complaint   Patient presents with    Tailbone Pain       Impression/Plan     27years old admitted for large posterior gluteal fold abscess  She has history of recurrent pilonidal abscesses and history of I&D in the past.  Status post incision and drainage in OR  Continue on Zosyn  Follow-up blood culture  Pain control  CBC and BMP in a.m. Further management, discharge planning and timing per surgery  Will follow peripherally, if any medical needs arise. COVID-19 positive screening test  She has no related symptoms  No specific treatment is needed  Clinical monitoring    Admission plan was discussed    Heparin for DVT prophylaxis    HPI:    27years old who presented to the emergency department at [facility] with complaint of having pain in the buttocks/perirectal area. She has history of pilonidal abscess that was drained in the past and recurred few times. In ED CT scan showed large posterior gluteal fold abscess that was incised and could not be drained completely in ED. She had surgical incision and drainage in the OR by surgery this evening. She denies having any cough, fever, or shortness of breath despite having positive screening test for COVID in ED. She only had minor loss of taste and smell 10 days ago. She has no other concerns. Past Medical History:   Diagnosis Date    Miscarriage      Past Surgical History:   Procedure Laterality Date    HX DILATION AND CURETTAGE  2013   Social, denies recreational drugs  No Known Allergies  Family history, hypertension  Home Medications:     No current facility-administered medications on file prior to encounter. Current Outpatient Medications on File Prior to Encounter   Medication Sig Dispense Refill    clindamycin (CLEOCIN) 300 mg capsule Take 1 Cap by mouth four (4) times daily for 7 days.  28 Cap 0    oxyCODONE-acetaminophen (Percocet) 5-325 mg per tablet Take 1 Tab by mouth every six (6) hours as needed for Pain for up to 5 days. Max Daily Amount: 4 Tabs. 15 Tab 0       Review of Systems:   GEN  no fever or chills, no weakness or malaise   CV  no chest pain,  or syncope. PULM  No cough, No wheezing, No hemoptysis  GI  no abd pain,  no vomiting     no dysuria, no flank pain   M/S- no myalgias, no joint pain, no back pain, no neck pain   SKIN  no rashes, no bruising   ENDO  no temp intolerance, no polyuria, no polydypsia   NEURO  no focal weakness, no speech changes   PSYCH  no depression, no anxiety, no hallucinations   HEENT  no headache, no ear pain, no sore throat, no blurry vision, no double vision, no neck pain     Physical Assessment:     Visit Vitals  BP 97/63   Pulse 75   Temp 97.3 °F (36.3 °C)   Resp 20   Ht 5' 6\" (1.676 m)   Wt 65.8 kg (145 lb)   LMP 07/14/2020 (Approximate)   SpO2 98%   Breastfeeding No   BMI 23.40 kg/m²     Constitutional: The patient is oriented to person, place, and time. No distress. Eyes: No scleral icterus. Neck: No JVD present. Cardiovascular: Regular rhythm. Exam reveals no gallop and no friction rub. Pulmonary/Chest: Effort normal. No stridor. No respiratory distress. has no wheezes. has no rales. Abdominal: Soft. Bowel sounds are normal. exhibits no distension. No tenderness. No rebound and no guarding. Gluteal area exam deferred to surgery. Musculoskeletal:Normal strength. exhibits no tenderness. Neurological:alert and oriented to person, place, and time. Skin: Skin is warm and dry.    Psychiatric: Memory, affect and judgment normal    Recent Results (from the past 24 hour(s))   CBC WITH AUTOMATED DIFF    Collection Time: 08/03/20 10:38 PM   Result Value Ref Range    WBC 11.2 4.6 - 13.2 K/uL    RBC 4.08 (L) 4.20 - 5.30 M/uL    HGB 12.1 12.0 - 16.0 g/dL    HCT 35.4 35.0 - 45.0 %    MCV 86.8 74.0 - 97.0 FL    MCH 29.7 24.0 - 34.0 PG    MCHC 34.2 31.0 - 37.0 g/dL    RDW 12.6 11.6 - 14.5 %    PLATELET 652 011 - 470 K/uL    MPV 9.5 9.2 - 11.8 FL    NEUTROPHILS 77 (H) 40 - 73 %    LYMPHOCYTES 10 (L) 21 - 52 %    MONOCYTES 12 (H) 3 - 10 %    EOSINOPHILS 1 0 - 5 %    BASOPHILS 0 0 - 2 %    ABS. NEUTROPHILS 8.7 (H) 1.8 - 8.0 K/UL    ABS. LYMPHOCYTES 1.1 0.9 - 3.6 K/UL    ABS. MONOCYTES 1.3 (H) 0.05 - 1.2 K/UL    ABS. EOSINOPHILS 0.1 0.0 - 0.4 K/UL    ABS.  BASOPHILS 0.0 0.0 - 0.1 K/UL    DF AUTOMATED     METABOLIC PANEL, BASIC    Collection Time: 08/03/20 10:38 PM   Result Value Ref Range    Sodium 131 (L) 136 - 145 mmol/L    Potassium 3.6 3.5 - 5.5 mmol/L    Chloride 98 (L) 100 - 111 mmol/L    CO2 27 21 - 32 mmol/L    Anion gap 6 3.0 - 18 mmol/L    Glucose 113 (H) 74 - 99 mg/dL    BUN 8 7.0 - 18 MG/DL    Creatinine 0.88 0.6 - 1.3 MG/DL    BUN/Creatinine ratio 9 (L) 12 - 20      GFR est AA >60 >60 ml/min/1.73m2    GFR est non-AA >60 >60 ml/min/1.73m2    Calcium 8.9 8.5 - 10.1 MG/DL   POC LACTIC ACID    Collection Time: 08/03/20 10:38 PM   Result Value Ref Range    Lactic Acid (POC) 1.21 0.40 - 2.00 mmol/L   HCG QL SERUM    Collection Time: 08/03/20 10:38 PM   Result Value Ref Range    HCG, Ql. Negative NEG     URINALYSIS W/ RFLX MICROSCOPIC    Collection Time: 08/04/20  2:22 AM   Result Value Ref Range    Color YELLOW      Appearance TURBID      Specific gravity >1.030 (H) 1.005 - 1.030    pH (UA) 5.5 5.0 - 8.0      Protein TRACE (A) NEG mg/dL    Glucose Negative NEG mg/dL    Ketone 15 (A) NEG mg/dL    Bilirubin Negative NEG      Blood Negative NEG      Urobilinogen 1.0 0.2 - 1.0 EU/dL    Nitrites Negative NEG      Leukocyte Esterase MODERATE (A) NEG     URINE MICROSCOPIC ONLY    Collection Time: 08/04/20  2:22 AM   Result Value Ref Range    WBC 4 to 10 0 - 4 /hpf    RBC Negative 0 - 5 /hpf    Epithelial cells 4+ 0 - 5 /lpf    Bacteria 2+ (A) NEG /hpf    Trichomonas 2+ (A) NEG   SARS-COV-2    Collection Time: 08/04/20  2:22 AM   Result Value Ref Range    Specimen source Nasopharyngeal COVID-19 rapid test Detected (A) NOTD     PTT    Collection Time: 08/04/20  1:27 PM   Result Value Ref Range    aPTT 30.5 23.0 - 36.4 SEC   PROTHROMBIN TIME + INR    Collection Time: 08/04/20  1:27 PM   Result Value Ref Range    Prothrombin time 14.5 11.5 - 15.2 sec    INR 1.2 0.8 - 1.2           Diagnostic Studies:  CT Abdom-Pelvis:  1. Subcutaneous abscess along the posterior gluteal cleft. This slightly  contacts the coccygeal region without clear evidence of a fistulous tract into  the spinal canal or presacral space.     2. Mild bilateral inguinal adenopathy. Follow-up recommended.

## 2020-08-05 NOTE — ROUTINE PROCESS
Bedside shift change report given to Linda Wade RN (oncoming nurse) by Stuart Valle RN (offgoing nurse). Report included the following information SBAR, Kardex, Procedure Summary, Intake/Output, MAR, Recent Results and Med Rec Status.

## 2020-08-05 NOTE — OP NOTES
[address] [place] [address]   OPERATIVE REPORT    Name:  Dulce Nesbitt  MR#:   [de-identified]  :  [id number]  ACCOUNT #:  [de-identified]  DATE OF SERVICE:  2020    PREOPERATIVE DIAGNOSIS:  Pilonidal abscess. POSTOPERATIVE DIAGNOSIS:  Pilonidal abscess. PROCEDURE PERFORMED:  Incision and drainage of pilonidal abscess with Penrose drain placement x2. SURGEON:  Jacquelin Cain. Mark Leigh MD    ASSISTANT:  None. ANESTHESIA:  MAC with spinal.    COMPLICATIONS:  None. SPECIMENS REMOVED:  Cultures. IMPLANTS:  None. ESTIMATED BLOOD LOSS:  20 mL. DRAINS:  Penrose x2. FINDINGS:  Abscess. INDICATION:  The patient is a 51-year-old woman with a pilonidal abscess. It was initially drained in the ED, but there was persistent abscess collections on CT imaging. She was brought to the operating room for surgical drainage. I explained the risks including bleeding, infection, and recurrence. She understood and wished to proceed. PROCEDURE:  The patient was properly identified in the holding area and brought to the operating room. She was placed in the prone jackknife position after spinal anesthetic was administered by Anesthesia. Buttocks were taped apart. Intergluteal region was prepped and draped in the usual sterile fashion. The area of fluctuance was greatest to the right of midline, and there was a small opening draining some pus. This area was opened in a paramedian location revealing copious additional pus, which was suctioned out. The abscess cavity was quite large. From the area of incision, it extended inferiorly into the left. Superiorly it extended to two tracts, both to the right and left of midline. After further irrigation, we made two counterincisions superiorly on the right and left side and encircled two Penrose drains into place to allow for adequate drainage as this was a large cavity. The Penrose drains were tied to themselves with 0 silk suture.   Half-strength Betadine-soaked gauze was packed into the wound. Dry sterile dressings were applied. The patient tolerated the procedure well. All instrument, sponge, and needle counts were correct at the end of the case x2. The patient awoke from anesthesia and was transported to her room in stable condition.       Ming Morrissey MD      [id number]  D:  08/05/2020 8:14  T:  08/05/2020 12:37  JOB #:  [id number]

## 2020-08-05 NOTE — PROGRESS NOTES
Hospitalist Progress Note    Patient: Martita Warren Age: 27 y.o. : 1990 MR#: [id number] SSN: [id number]  Date/Time: 2020 10:58 AM    DOA: 8/3/2020  PCP: None    Subjective:     No fever, pain improved with narcotic  Has decrease inspiratory effort with her ICS. CXR with possible infiltrates. No hypoxia. No chest pain. She has COVID-19 test positive outpt for >7 days. S/p I&D of pilonidal abscess with penrose drain placement   Wound culture without growth to date. She has been on Zoysn. Interval Hospital Course:      ROS:  No current fever/chills, no headache, no dizziness, no facial pain, no sinus congestion,   No swallowing pain, No chest pain, no palpitation, no shortness of breath, no abd pain,  No diarrhea, no urinary complaint, no leg pain or swelling      Assessment/Plan:   1. Pilonidal abscess, s/p I&D with Penrose drainage  2. COVID-19 infection, no clear evidence of pneumonia   3. Hypokalemia   4. Normocytic anemia with recent surgery  5. Pyuria without symptom   6. Hypovolemic hyponatremia       Will encourage her for ICS to increase effort. If not able, check CXR today. Will check LDH, ferritin, procalc, D-dimer tomorrow. Start on azithromycin. Hold off decadron   Vit c, vit d, zinc, melatonin  Continue zosyn, wound culture follow up  Pain control.  Can continue fluid if hypotension   pepcid    Full code     Additional Notes:  Time spent >30 minutes      Case discussed with:  [x]Patient  []Family  [x]Nursing  []Case Management  DVT Prophylaxis:  []Lovenox  [x]Hep SQ  [x]SCDs  []Coumadin   []On Heparin gtt    Signed By: Gifty Mora MD     2020 10:58 AM              Objective:   VS:   Visit Vitals  BP 97/58 (BP 1 Location: Left arm, BP Patient Position: At rest)   Pulse 73   Temp 97.6 °F (36.4 °C)   Resp 18   Ht 5' 6\" (1.676 m)   Wt 65.8 kg (145 lb)   LMP 2020 (Approximate)   SpO2 100%   Breastfeeding No   BMI 23.40 kg/m²      Tmax/24hrs: Temp (24hrs), Av °F (36.7 °C), Min:97.3 °F (36.3 °C), Max:99 °F (37.2 °C)      Intake/Output Summary (Last 24 hours) at 2020 1058  Last data filed at 2020 0600  Gross per 24 hour   Intake    Output 200 ml   Net -200 ml       Tele:   General:  Cooperative, Not in acute distress, speaks in full sentence while in bed  HEENT: PERRL, EOMI, supple neck, no JVD, dry oral mucosa  Cardiovascular: S1S2 regular, no rub/gallop   Pulmonary: Clear air entry bilaterally, no wheezing, no crackle  GI:  Soft, non tender, non distended, +bs, no guarding   Extremities:  No pedal edema, +distal pulses appreciated   Neuro: AOx3, moving all extremities, no gross deficit.    Left gluteal cleft abscess wound with good drainage     Additional:       Current Facility-Administered Medications   Medication Dose Route Frequency    acetaminophen (TYLENOL) tablet 650 mg  650 mg Oral Q6H PRN    Or    acetaminophen (TYLENOL) suppository 650 mg  650 mg Rectal Q6H PRN    ascorbic acid (vitamin C) (VITAMIN C) tablet 500 mg  500 mg Oral BID    cholecalciferol (VITAMIN D3) capsule 5,000 Units  5,000 Units Oral DAILY    zinc sulfate (ZINCATE) 220 (50) mg capsule 1 Cap  1 Cap Oral DAILY    melatonin tablet 3 mg  3 mg Oral QHS    famotidine (PEPCID) tablet 20 mg  20 mg Oral BID    dextrose 5% and 0.9% NaCl infusion  105 mL/hr IntraVENous CONTINUOUS    piperacillin-tazobactam (ZOSYN) 3.375 g in 0.9% sodium chloride (MBP/ADV) 100 mL MBP  3.375 g IntraVENous Q6H    morphine injection 2 mg  2 mg IntraVENous Q3H PRN    ondansetron (ZOFRAN) injection 4 mg  4 mg IntraVENous Q6H PRN    heparin (porcine) injection 5,000 Units  5,000 Units SubCUTAneous Q8H            Lab/Data Review:  Labs: Results:       Chemistry Recent Labs     20  0249 208   GLU 92 113*    131*   K 3.3* 3.6    98*   CO2 28 27   BUN 8 8   CREA 0.88 0.88   BUCR 9* 9*   AGAP 6 6   CA 8.5 8.9   PHOS 3.9  --      No results for input(s): TBIL, ALT, ALKP, TP, ALB, GLOB, AGRAT in the last 72 hours. No lab exists for component: SGOT   CBC w/Diff Recent Labs     08/05/20  0249 08/03/20  2238   WBC 6.8 11.2   RBC 3.89* 4.08*   HGB 11.5* 12.1   HCT 33.7* 35.4   MCV 86.6 86.8   MCH 29.6 29.7   MCHC 34.1 34.2   RDW 13.1 12.6    278   GRANS  --  77*   LYMPH  --  10*   EOS  --  1      Coagulation Recent Labs     08/04/20  1327   PTP 14.5   INR 1.2   APTT 30.5       Iron/Ferritin No results found for: IRON, FE, TIBC, IBCT, PSAT, FERR    BNP    Cardiac Enzymes No results found for: CPK, RCK1, RCK2, RCK3, RCK4, CKMB, CKNDX, CKND1, TROPT, TROIQ, BNPP, BNP     Lactic Acid    Thyroid Studies          All Micro Results     Procedure Component Value Units Date/Time    CULTURE, ANAEROBIC [[id number]] Collected:  08/04/20 2015    Order Status:  Completed Specimen:  Abscess Updated:  08/05/20 0721    CULTURE, BODY FLUID Schultz Lemme [[id number]] Collected:  08/04/20 2015    Order Status:  Completed Specimen: Body Fluid from Miscellaneous Fluid Updated:  08/05/20 0721    CULTURE, BLOOD [[id number]] Collected:  08/04/20 0245    Order Status:  Completed Specimen:  Blood Updated:  08/05/20 0713     Special Requests: --        NO SPECIAL REQUESTS  RIGHT  Antecubital       Culture result: NO GROWTH AFTER 23 HOURS       CULTURE, BLOOD [[id number]] Collected:  08/04/20 0247    Order Status:  Completed Specimen:  Blood Updated:  08/05/20 0713     Special Requests: --        NO SPECIAL REQUESTS  RIGHT  HAND       Culture result: NO GROWTH AFTER 23 HOURS               Images:    CT (Most Recent). CT Results (most recent):  Results from Hospital Encounter encounter on 08/03/20   CT PELV W CONT    Narrative EXAM: CT PELV W CONT    INDICATION: Pain. Cyst on tailbone. COMPARISON: None.     CONTRAST: 100 mL of Isovue 300 IV contrast.    TECHNIQUE:   Multislice helical CT was performed from the iliac crest to the symphysis pubis  during uneventful rapid bolus intravenous contrast administration. Study was  performed prone. Oral contrast was not administered. Coronal and sagittal  reformations were generated. CT dose reduction was achieved through use of a  standardized protocol tailored for this examination and automatic exposure  control for dose modulation. FINDINGS:  The bladder and pelvic organs are within normal limits for age. No bowel  abnormality. Normal size appendix. No pelvic free fluid or iliac adenopathy. Mild bilateral inguinal adenopathy measuring 15 mm. Bones are unremarkable. Rim enhanced bilobed fluid collection along the midline buttock/gluteal cleft  posteriorly measuring overall approximately 3.8 x 6.6 x 6.7 cm. No associated  gas. No presacral extension of the collection. Collection minimally abuts upper  coccygeal region without definitive tract into the spinal canal.      Impression IMPRESSION:  1. Subcutaneous abscess along the posterior gluteal cleft. This slightly  contacts the coccygeal region without clear evidence of a fistulous tract into  the spinal canal or presacral space. 2. Mild bilateral inguinal adenopathy. Follow-up recommended. XRAY (Most Recent) XR Results (most recent):  Results from Hospital Encounter encounter on 08/03/20   XR CHEST PORT    Narrative CHEST PORTABLE    CPT CODE: 12204    COMPARISON: 3/28/2017    INDICATIONS: COVID positive    FINDINGS: Heart and mediastinum are normal. There are probably some mild patchy  infiltrates in the right lung base. The left lung is relatively clear. No  pleural effusions. No significant osseous abnormalities. Impression Impression:    Possible mild patchy infiltrates in the right lung base. Suggest PA and lateral  chest radiographs for further evaluation. EKG No results found for this or any previous visit.      2D ECHO

## 2020-08-05 NOTE — PROGRESS NOTES
Reason for Admission:  Susy-rectal abscess [K61.1]                 RUR Score:   8%           Plan for utilizing home health: To be determined. Likelihood of Readmission:   LOW                         Transition of Care Plan:              Initial assessment completed with patient. Cognitive status of patient: oriented to time, place, person and situation. Face sheet information confirmed:  yes. The patient designates Ned Smith, Mother ([phone])  to participate in her discharge plan and to receive any needed information. This patient lives in a single family home with parents. Patient is able to navigate steps as needed. Prior to hospitalization, patient was considered to be independent with ADLs/IADLS : yes . Patient has a current ACP document on file: no. Has decision maker on file. The patient's family will be available to transport patient home upon discharge. The patient reported no medical equipment available in the home. Patient is not currently active with home health. Patient has not stayed in a skilled nursing facility or rehab. This patient is on dialysis : no    Freedom of choice signed: no.     Currently, the discharge plan is Home with family assistance  Patient will need to be setup with a PCP at discharge. CM will continue to assist with transitional needs for a safe discharge. The patient states that she can obtain her medications from the pharmacy, and take her medications as directed. Patient's current insurance is [facility] Medicaid      Care Management Interventions  PCP Verified by CM: (Will need PCP set up.)  Mode of Transport at Discharge: Self  Transition of Care Consult (CM Consult): Discharge Planning  Discharge Durable Medical Equipment: No  Physical Therapy Consult: No  Occupational Therapy Consult: No  Speech Therapy Consult: No  Current Support Network:  Other(Lives with parents.)  Confirm Follow Up Transport: Self  Discharge Location  Discharge Placement: Home with home health        RICARDO Ashley, RN  Pager # [phone]  Care Manager

## 2020-08-05 NOTE — PROGRESS NOTES
[facility] [address] [place] [address]  [phone]  Colon and Rectal Surgery Progress Note      Patient: Shelly Lopez MRN: [de-identified]  SSN: [id number]    YOB: 1990  Age: 27 y.o. Sex: female      Admit Date: 8/3/2020    LOS: 1 day     Subjective:     C/o stinging pain. Overall pain improved. Objective:     Vitals:    08/05/20 0030 08/05/20 0400 08/05/20 0754 08/05/20 0805   BP: 96/59 95/60 97/58    Pulse: 70 72 73    Resp: 18 18 18    Temp: 97.5 °F (36.4 °C) 97.7 °F (36.5 °C) 97.6 °F (36.4 °C)    SpO2: 100% 100% 100% 100%   Weight:       Height:            Intake and Output:  Current Shift: No intake/output data recorded.   Last three shifts: 08/03 1901 - 08/05 0700  In: 100 [I.V.:100]  Out: 200 [Urine:200]    Physical Exam:     Exam deferred given COVID status    Lab/Data Review:    CMP:   Lab Results   Component Value Date/Time     08/05/2020 02:49 AM    K 3.3 (L) 08/05/2020 02:49 AM     08/05/2020 02:49 AM    CO2 28 08/05/2020 02:49 AM    AGAP 6 08/05/2020 02:49 AM    GLU 92 08/05/2020 02:49 AM    BUN 8 08/05/2020 02:49 AM    CREA 0.88 08/05/2020 02:49 AM    GFRAA >60 08/05/2020 02:49 AM    GFRNA >60 08/05/2020 02:49 AM    CA 8.5 08/05/2020 02:49 AM    MG 2.2 08/05/2020 02:49 AM    PHOS 3.9 08/05/2020 02:49 AM     CBC:   Lab Results   Component Value Date/Time    WBC 6.8 08/05/2020 02:49 AM    HGB 11.5 (L) 08/05/2020 02:49 AM    HCT 33.7 (L) 08/05/2020 02:49 AM     08/05/2020 02:49 AM        G.S. from OR cultures just WBC's    Assessment:     Pilonidal abscess, s/p I&D  COVID +    Plan:     Continue zosyn  Diet as tolerated  COVID per medicine  Will examine in AM and remove packing, should be able to go from surgical standpoint tomorrow if no COVID related issues  Will not need home health for dressing changes    Signed By: Breonna Echeverria MD        August 5, 2020

## 2020-08-05 NOTE — BRIEF OP NOTE
Brief Postoperative Note    Patient: Zayra Smith  YOB: 1990  MRN: [id number]    Date of Procedure: 8/4/2020     Pre-Op Diagnosis: PILONIDAL ABSCESS    Post-Op Diagnosis: Same as preoperative diagnosis.       Procedure(s):  INCISION AND DRAINAGE PILONIDAL ABSCESS WITH PENROSE DRAIN PLACEMENT X 2    Surgeon(s):  Janel Charles MD    Surgical Assistant: None    Anesthesia: MAC     Estimated Blood Loss (mL): less than 50     Complications: None    Specimens:   ID Type Source Tests Collected by Time Destination   1 : Pilonidal abscess Wound Abscess CULTURE, ANAEROBIC, CULTURE, WOUND W Braden Cabot Pearlene Celestine, MD 8/4/2020 2015 Microbiology        Implants: * No implants in log *    Drains:   Penrose Drain 08/04/20 Posterior Buttocks (Active)       Penrose Drain 08/04/20 Posterior Buttocks (Active)       Findings: ABSCESS    [id number]    Electronically Signed by Lux Santizo MD on 8/4/2020 at 8:36 PM

## 2020-08-05 NOTE — ANESTHESIA PROCEDURE NOTES
Spinal Block    Start time: 8/4/2020 7:55 PM  End time: 8/4/2020 12:00 AM  Performed by: Brant Mccray CRNA  Authorized by: Brnat Mccray CRNA     Pre-procedure:   Indications: primary anesthetic  Preanesthetic Checklist: patient identified, risks and benefits discussed, anesthesia consent, site marked, patient being monitored and timeout performed    Timeout Time: 19:54          Spinal Block:   Patient Position:  Seated  Prep Region:  Lumbar  Prep: DuraPrep and patient draped      Location:  L3-4  Technique:  Single shot    Local Dose (mL):  3    Needle:   Needle Type:  Pencan  Needle Gauge:  25 G  Attempts:  1      Events: CSF confirmed, no blood with aspiration and no paresthesia        Assessment:  Insertion:  Uncomplicated

## 2020-08-06 VITALS
BODY MASS INDEX: 23.3 KG/M2 | SYSTOLIC BLOOD PRESSURE: 119 MMHG | HEIGHT: 66 IN | DIASTOLIC BLOOD PRESSURE: 79 MMHG | HEART RATE: 69 BPM | TEMPERATURE: 97.1 F | RESPIRATION RATE: 18 BRPM | OXYGEN SATURATION: 100 % | WEIGHT: 145 LBS

## 2020-08-06 LAB
ALBUMIN SERPL-MCNC: 2.3 G/DL (ref 3.4–5)
ALBUMIN/GLOB SERPL: 0.5 {RATIO} (ref 0.8–1.7)
ALP SERPL-CCNC: 46 U/L (ref 45–117)
ALT SERPL-CCNC: 11 U/L (ref 13–56)
ANION GAP SERPL CALC-SCNC: 6 MMOL/L (ref 3–18)
AST SERPL-CCNC: 26 U/L (ref 10–38)
BASOPHILS # BLD: 0 K/UL (ref 0–0.1)
BASOPHILS NFR BLD: 0 % (ref 0–2)
BILIRUB SERPL-MCNC: 0.4 MG/DL (ref 0.2–1)
BUN SERPL-MCNC: 8 MG/DL (ref 7–18)
BUN/CREAT SERPL: 10 (ref 12–20)
CALCIUM SERPL-MCNC: 8.1 MG/DL (ref 8.5–10.1)
CHLORIDE SERPL-SCNC: 111 MMOL/L (ref 100–111)
CO2 SERPL-SCNC: 25 MMOL/L (ref 21–32)
CREAT SERPL-MCNC: 0.83 MG/DL (ref 0.6–1.3)
CRP SERPL-MCNC: 6.4 MG/DL (ref 0–0.3)
DIFFERENTIAL METHOD BLD: ABNORMAL
EOSINOPHIL # BLD: 0.2 K/UL (ref 0–0.4)
EOSINOPHIL NFR BLD: 5 % (ref 0–5)
ERYTHROCYTE [DISTWIDTH] IN BLOOD BY AUTOMATED COUNT: 13 % (ref 11.6–14.5)
FERRITIN SERPL-MCNC: 139 NG/ML (ref 8–388)
GLOBULIN SER CALC-MCNC: 4.6 G/DL (ref 2–4)
GLUCOSE SERPL-MCNC: 115 MG/DL (ref 74–99)
HCT VFR BLD AUTO: 33 % (ref 35–45)
HGB BLD-MCNC: 11 G/DL (ref 12–16)
LDH SERPL L TO P-CCNC: 287 U/L (ref 81–234)
LYMPHOCYTES # BLD: 1.4 K/UL (ref 0.9–3.6)
LYMPHOCYTES NFR BLD: 30 % (ref 21–52)
MCH RBC QN AUTO: 29.2 PG (ref 24–34)
MCHC RBC AUTO-ENTMCNC: 33.3 G/DL (ref 31–37)
MCV RBC AUTO: 87.5 FL (ref 74–97)
MONOCYTES # BLD: 0.6 K/UL (ref 0.05–1.2)
MONOCYTES NFR BLD: 12 % (ref 3–10)
NEUTS SEG # BLD: 2.3 K/UL (ref 1.8–8)
NEUTS SEG NFR BLD: 53 % (ref 40–73)
PLATELET # BLD AUTO: 242 K/UL (ref 135–420)
PMV BLD AUTO: 10.5 FL (ref 9.2–11.8)
POTASSIUM SERPL-SCNC: 4.5 MMOL/L (ref 3.5–5.5)
PROT SERPL-MCNC: 6.9 G/DL (ref 6.4–8.2)
RBC # BLD AUTO: 3.77 M/UL (ref 4.2–5.3)
SODIUM SERPL-SCNC: 142 MMOL/L (ref 136–145)
WBC # BLD AUTO: 4.5 K/UL (ref 4.6–13.2)

## 2020-08-06 PROCEDURE — 80053 COMPREHEN METABOLIC PANEL: CPT

## 2020-08-06 PROCEDURE — 83615 LACTATE (LD) (LDH) ENZYME: CPT

## 2020-08-06 PROCEDURE — 86140 C-REACTIVE PROTEIN: CPT

## 2020-08-06 PROCEDURE — 85025 COMPLETE CBC W/AUTO DIFF WBC: CPT

## 2020-08-06 PROCEDURE — 74011250637 HC RX REV CODE- 250/637: Performed by: INTERNAL MEDICINE

## 2020-08-06 PROCEDURE — 82728 ASSAY OF FERRITIN: CPT

## 2020-08-06 PROCEDURE — 74011250636 HC RX REV CODE- 250/636: Performed by: EMERGENCY MEDICINE

## 2020-08-06 PROCEDURE — 36415 COLL VENOUS BLD VENIPUNCTURE: CPT

## 2020-08-06 PROCEDURE — 74011250636 HC RX REV CODE- 250/636: Performed by: INTERNAL MEDICINE

## 2020-08-06 PROCEDURE — 74011000258 HC RX REV CODE- 258: Performed by: EMERGENCY MEDICINE

## 2020-08-06 PROCEDURE — 74011250636 HC RX REV CODE- 250/636: Performed by: COLON & RECTAL SURGERY

## 2020-08-06 RX ORDER — AMOXICILLIN AND CLAVULANATE POTASSIUM 875; 125 MG/1; MG/1
1 TABLET, FILM COATED ORAL EVERY 12 HOURS
Qty: 28 TAB | Refills: 0 | Status: SHIPPED | OUTPATIENT
Start: 2020-08-06 | End: 2020-08-20

## 2020-08-06 RX ORDER — OXYCODONE AND ACETAMINOPHEN 5; 325 MG/1; MG/1
1 TABLET ORAL
Qty: 40 TAB | Refills: 0 | Status: SHIPPED | OUTPATIENT
Start: 2020-08-06 | End: 2020-08-13

## 2020-08-06 RX ORDER — OXYCODONE AND ACETAMINOPHEN 5; 325 MG/1; MG/1
1 TABLET ORAL
Status: DISCONTINUED | OUTPATIENT
Start: 2020-08-06 | End: 2020-08-06 | Stop reason: HOSPADM

## 2020-08-06 RX ADMIN — HEPARIN SODIUM 5000 UNITS: 5000 INJECTION INTRAVENOUS; SUBCUTANEOUS at 12:11

## 2020-08-06 RX ADMIN — HEPARIN SODIUM 5000 UNITS: 5000 INJECTION INTRAVENOUS; SUBCUTANEOUS at 05:26

## 2020-08-06 RX ADMIN — ONDANSETRON 4 MG: 2 INJECTION INTRAMUSCULAR; INTRAVENOUS at 05:26

## 2020-08-06 RX ADMIN — ZINC SULFATE 220 MG (50 MG) CAPSULE 1 CAPSULE: CAPSULE at 09:12

## 2020-08-06 RX ADMIN — DEXTROSE MONOHYDRATE AND SODIUM CHLORIDE 105 ML/HR: 5; .9 INJECTION, SOLUTION INTRAVENOUS at 09:53

## 2020-08-06 RX ADMIN — PIPERACILLIN AND TAZOBACTAM 3.38 G: 3; .375 INJECTION, POWDER, LYOPHILIZED, FOR SOLUTION INTRAVENOUS at 09:13

## 2020-08-06 RX ADMIN — DEXTROSE MONOHYDRATE AND SODIUM CHLORIDE 105 ML/HR: 5; .9 INJECTION, SOLUTION INTRAVENOUS at 05:28

## 2020-08-06 RX ADMIN — Medication 1 CAPSULE: at 09:12

## 2020-08-06 RX ADMIN — PIPERACILLIN AND TAZOBACTAM 3.38 G: 3; .375 INJECTION, POWDER, LYOPHILIZED, FOR SOLUTION INTRAVENOUS at 03:45

## 2020-08-06 RX ADMIN — Medication 500 MG: at 09:12

## 2020-08-06 RX ADMIN — Medication 5000 UNITS: at 09:12

## 2020-08-06 RX ADMIN — FAMOTIDINE 20 MG: 20 TABLET ORAL at 09:12

## 2020-08-06 NOTE — DISCHARGE SUMMARY
Discharge Summary    Patient: Diane Fiore               Sex: female          DOA: 8/3/2020         YOB: 1990      Age:  27 y.o.        LOS:  LOS: 2 days                Admit Date: 8/3/2020    Discharge Date: 8/6/2020    Primary care physician: None    Discharge Diagnoses:    1. Pilonidal abscess, left gluteal cleft abscess, s/p I&D with Penrose drainage  2. COVID-19 infection, no clear evidence of pneumonia   3. Hypokalemia   4. Normocytic anemia with recent surgery  5. Pyuria without symptom   6. Hypovolemic hyponatremia     Discharge Condition: Good  Disposition: home  Code Status:full code     Follow up for Primary Care Physician:  1) continue with oral antibiotic and pain medication. 2) she needs follow up with Dr. Dante Anna in 3 weeks  3) wound care dry dressing     Hospital Course:   27years old who presented to the emergency department at [facility] with complaint of having pain in the buttocks/perirectal area. She has history of pilonidal abscess that was drained in the past and recurred few times. In ED CT scan showed large posterior gluteal fold abscess that was incised and could not be drained completely in ED. She had surgical incision and drainage in the OR by surgery this evening. She denies having any cough, fever, or shortness of breath despite having positive screening test for COVID in ED. She only had minor loss of taste and smell 10 days ago. She has no other concerns. S/p I&D of pilonidal abscess with penrose drain placement   Wound culture without growth to date. She has been on Zoysn. Has decrease inspiratory effort with her ICS. CXR with possible infiltrates. No hypoxia. No chest pain. She has COVID-19 test positive outpt for >7 days. She was on azithromycin x1. Repeat CXR without infection, azithromycin stopped     Last 24 Hours: no overnight complaint. Pain is better. No respiratory symptom. CXR without pneumonia.  Able to increase effort with her ICS. Surgery ok for discharge with Augmentin and wound care at home. ROS: No current fever/chills, no headache, no dizziness, no facial pain, no sinus congestion,   No swallowing pain, No chest pain, no palpitation, no shortness of breath, no abd pain,  No diarrhea, no urinary complaint, no leg pain or swelling    VS:   Visit Vitals  /79 (BP 1 Location: Left arm, BP Patient Position: At rest)   Pulse 69   Temp 97.1 °F (36.2 °C)   Resp 18   Ht 5' 6\" (1.676 m)   Wt 65.8 kg (145 lb)   LMP 2020 (Approximate)   SpO2 100%   Breastfeeding No   BMI 23.40 kg/m²      Tmax/24hrs: Temp (24hrs), Av.2 °F (36.8 °C), Min:97.1 °F (36.2 °C), Max:98.7 °F (37.1 °C)      Intake/Output Summary (Last 24 hours) at 2020 1146  Last data filed at 2020 0700  Gross per 24 hour   Intake 1365 ml   Output 800 ml   Net 565 ml     Tele:   General:  Cooperative, Not in acute distress, speaks in full sentence while in bed  HEENT: PERRL, EOMI, supple neck, no JVD, dry oral mucosa  Cardiovascular: S1S2 regular, no rub/gallop   Pulmonary: Clear air entry bilaterally, no wheezing, no crackle  GI:  Soft, non tender, non distended, +bs, no guarding   Extremities:  No pedal edema, +distal pulses appreciated   Neuro: AOx3, moving all extremities, no gross deficit. Left gluteal wound clean    Consults:   Surgery: Dr. Yessy Perez:  CT Results (most recent):  Results from Hospital Encounter encounter on 20   CT PELV W CONT    Narrative EXAM: CT PELV W CONT    INDICATION: Pain. Cyst on tailbone. COMPARISON: None. CONTRAST: 100 mL of Isovue 300 IV contrast.    TECHNIQUE:   Multislice helical CT was performed from the iliac crest to the symphysis pubis  during uneventful rapid bolus intravenous contrast administration. Study was  performed prone. Oral contrast was not administered. Coronal and sagittal  reformations were generated.   CT dose reduction was achieved through use of a  standardized protocol tailored for this examination and automatic exposure  control for dose modulation. FINDINGS:  The bladder and pelvic organs are within normal limits for age. No bowel  abnormality. Normal size appendix. No pelvic free fluid or iliac adenopathy. Mild bilateral inguinal adenopathy measuring 15 mm. Bones are unremarkable. Rim enhanced bilobed fluid collection along the midline buttock/gluteal cleft  posteriorly measuring overall approximately 3.8 x 6.6 x 6.7 cm. No associated  gas. No presacral extension of the collection. Collection minimally abuts upper  coccygeal region without definitive tract into the spinal canal.      Impression IMPRESSION:  1. Subcutaneous abscess along the posterior gluteal cleft. This slightly  contacts the coccygeal region without clear evidence of a fistulous tract into  the spinal canal or presacral space. 2. Mild bilateral inguinal adenopathy. Follow-up recommended. XR Results (most recent):  Results from Hospital Encounter encounter on 08/03/20   XR CHEST PORT    Narrative AP CXR:    Comparison August 4, 2020    HISTORY: Covid-19 positive. Cardiac silhouette is top normal in size. No vascular congestion. Lungs and  costophrenic angles are clear.       Impression IMPRESSION: No pulmonary pneumonia         Lab/Data Review:  Labs: Results:       Chemistry Recent Labs     08/06/20 0302 08/05/20 0249 08/03/20 2238   * 92 113*    136 131*   K 4.5 3.3* 3.6    102 98*   CO2 25 28 27   BUN 8 8 8   CREA 0.83 0.88 0.88   CA 8.1* 8.5 8.9   AGAP 6 6 6   BUCR 10* 9* 9*   AP 46  --   --    TP 6.9  --   --    ALB 2.3*  --   --    GLOB 4.6*  --   --    AGRAT 0.5*  --   --       CBC w/Diff Recent Labs     08/06/20 0302 08/05/20 0249 08/03/20 2238   WBC 4.5* 6.8 11.2   RBC 3.77* 3.89* 4.08*   HGB 11.0* 11.5* 12.1   HCT 33.0* 33.7* 35.4    256 278   GRANS 53  --  77*   LYMPH 30  --  10*   EOS 5  --  1      Coagulation Recent Labs 08/04/20  1327   PTP 14.5   INR 1.2   APTT 30.5       Iron/Ferritin No results for input(s): IRON in the last 72 hours. No lab exists for component: TIBCCALC   BNP No results for input(s): BNPP in the last 72 hours. Cardiac Enzymes No results for input(s): CPK, CKND1, OZIEL in the last 72 hours. No lab exists for component: CKRMB, TROIP   Liver Enzymes Recent Labs     08/06/20  0302   TP 6.9   ALB 2.3*   AP 46      Thyroid Studies No results for input(s): T4, T3U, TSH, TSHEXT in the last 72 hours. No lab exists for component: T3RU       All Micro Results     Procedure Component Value Units Date/Time    CULTURE, BODY FLUID Verlie Nam STAIN [[id number]] Collected:  08/04/20 2015    Order Status:  Completed Specimen: Body Fluid from Miscellaneous Fluid Updated:  08/06/20 0932     Special Requests: PILONIDAL ABSCESS     GRAM STAIN 1+ WBCS SEEN         NO ORGANISMS SEEN        Culture result:       Culture performed on Fluid swab specimen            NO GROWTH 1 DAY       CULTURE, BLOOD [[id number]] Collected:  08/04/20 0247    Order Status:  Completed Specimen:  Blood Updated:  08/06/20 0640     Special Requests: --        NO SPECIAL REQUESTS  RIGHT  HAND       Culture result: NO GROWTH 2 DAYS       CULTURE, BLOOD [[id number]] Collected:  08/04/20 0245    Order Status:  Completed Specimen:  Blood Updated:  08/06/20 0640     Special Requests: --        NO SPECIAL REQUESTS  RIGHT  Antecubital       Culture result: NO GROWTH 2 DAYS       CULTURE, ANAEROBIC [[id number]] Collected:  08/04/20 2015    Order Status:  Completed Specimen:  Abscess Updated:  08/05/20 0721                Medications at discharge  including reasons for change and indications for new ones:   Current Discharge Medication List      START taking these medications    Details   amoxicillin-clavulanate (AUGMENTIN) 875-125 mg per tablet Take 1 Tab by mouth every twelve (12) hours for 14 days.  Indications: an infection of the skin and the tissue below the skin  Qty: 28 Tab, Refills: 0         CONTINUE these medications which have CHANGED    Details   oxyCODONE-acetaminophen (Percocet) 5-325 mg per tablet Take 1 Tab by mouth every four (4) hours as needed for Pain for up to 7 days. Max Daily Amount: 6 Tabs.   Qty: 40 Tab, Refills: 0    Associated Diagnoses: Abscess         STOP taking these medications       clindamycin (CLEOCIN) 300 mg capsule Comments:   Reason for Stopping:                       Pending laboratory work and tests: wound culture    Activity: Activity as tolerated    Diet: Cardiac Diet    Wound Care: Keep wound clean and dry        Valentina Ashley MD  8/6/2020  11:46 AM

## 2020-08-06 NOTE — PROGRESS NOTES
Discharge planning    Discharge order noted for today. Spoke with  patient via phone and  agreeable to the transition plan today. No home health need for wound care per Dr. Sepulveda December note. Transport has been arranged with family. Updated bedside RN, Sergio Vivar,  to the transition plan. Discharge information has been documented on the AVS. Patient was setup with PCP      RICARDO Banegas, RN  Pager # [phone]  Care Manager

## 2020-08-06 NOTE — DISCHARGE INSTRUCTIONS
Discharge Instructions    Patient: Marleny Wells MRN: [de-identified]  CSN: [id number]    YOB: 1990  Age: 27 y.o. Sex: female    DOA: 8/3/2020 LOS:  LOS: 2 days   Discharge Date:      A pilonidal abscess is an infection caused by an ingrown hair. The abscess occurs in the area of the tailbone and the top of the buttocks. The infection causes a pocket of pus to form. ACUTE DIAGNOSES:   1. Pilonidal abscess, s/p I&D with Penrose drainage      -continue wound care and oral antibiotic at home  2. COVID-19 infection, no pneumonia on chest xray  3. Hypokalemia, resolved   4. Normocytic anemia with recent surgery, stable  5. Pyuria without symptom  6. Hypovolemic hyponatremia, resolved       DISCHARGE MEDICATIONS:     · It is important that you take the medication exactly as they are prescribed. · Keep your medication in the bottles provided by the pharmacist and keep a list of the medication names, dosages, and times to be taken in your wallet. · Do not take other medications without consulting your doctor. DIET:  Regular Diet    ACTIVITY: Activity as tolerated    Dry dressing over penrose drains as necessary  No packing of wound necessary  Warm soaks in bath tub for comfort  Complete antibiotics  Percocet for pain    ADDITIONAL INFORMATION: If you experience any of the following symptoms then please call your primary care physician or return to the emergency room if you cannot get hold of your doctor: Fever, chills, nausea, vomiting, diarrhea, change in mentation, falling, bleeding, shortness of breath. FOLLOW UP CARE:  Dr. Maya  you are to call and set up an appointment to see them in 1-2 weeks. Follow-up with Dr. Juvencio Pickens, surgery in 3 weeks       Information obtained by :  I understand that if any problems occur once I am at home I am to contact my physician. I understand and acknowledge receipt of the instructions indicated above. [de-identified] or R.N.'s Signature                                                                  Date/Time                                                                                                                                              Patient or Representative Signature                                                          Date/Time    Karly Bill MD  8/6/2020  11:41 AM    Advance Care Planning  People with COVID-19 may have no symptoms, mild symptoms, such as fever, cough, and shortness of breath or they may have more severe illness, developing severe and fatal pneumonia. As a result, Advance Care Planning with attention to naming a health care decision maker (someone you trust to make healthcare decisions for you if you could not speak for yourself) and sharing other health care preferences is important BEFORE a possible health crisis. Please contact your Primary Care Provider to discuss Advance Care Planning. Preventing the Spread of Coronavirus Disease 2019 in Homes and Residential Communities  For the most recent information go to [email or web address]    Prevention steps for People with confirmed or suspected COVID-19 (including persons under investigation) who do not need to be hospitalized  and   People with confirmed COVID-19 who were hospitalized and determined to be medically stable to go home    Your healthcare provider and public health staff will evaluate whether you can be cared for at home. If it is determined that you do not need to be hospitalized and can be isolated at home, you will be monitored by staff from your local or state health department. You should follow the prevention steps below until a healthcare provider or local or state health department says you can return to your normal activities.   Stay home except to get medical care  People who are mildly ill with COVID-19 are able to isolate at home during their illness. You should restrict activities outside your home, except for getting medical care. Do not go to work, school, or public areas. Avoid using public transportation, ride-sharing, or taxis. Separate yourself from other people and animals in your home  People: As much as possible, you should stay in a specific room and away from other people in your home. Also, you should use a separate bathroom, if available. Animals: You should restrict contact with pets and other animals while you are sick with COVID-19, just like you would around other people. Although there have not been reports of pets or other animals becoming sick with COVID-19, it is still recommended that people sick with COVID-19 limit contact with animals until more information is known about the virus. When possible, have another member of your household care for your animals while you are sick. If you are sick with COVID-19, avoid contact with your pet, including petting, snuggling, being kissed or licked, and sharing food. If you must care for your pet or be around animals while you are sick, wash your hands before and after you interact with pets and wear a facemask. Call ahead before visiting your doctor  If you have a medical appointment, call the healthcare provider and tell them that you have or may have COVID-19. This will help the healthcare providers office take steps to keep other people from getting infected or exposed. Wear a facemask  You should wear a facemask when you are around other people (e.g., sharing a room or vehicle) or pets and before you enter a healthcare providers office. If you are not able to wear a facemask (for example, because it causes trouble breathing), then people who live with you should not stay in the same room with you, or they should wear a facemask if they enter your room.   Cover your coughs and sneezes  Cover your mouth and nose with a tissue when you cough or sneeze. Throw used tissues in a lined trash can. Immediately wash your hands with soap and water for at least 20 seconds or, if soap and water are not available, clean your hands with an alcohol-based hand  that contains at least 60% alcohol. Clean your hands often  Wash your hands often with soap and water for at least 20 seconds, especially after blowing your nose, coughing, or sneezing; going to the bathroom; and before eating or preparing food. If soap and water are not readily available, use an alcohol-based hand  with at least 60% alcohol, covering all surfaces of your hands and rubbing them together until they feel dry. Soap and water are the best option if hands are visibly dirty. Avoid touching your eyes, nose, and mouth with unwashed hands. Avoid sharing personal household items  You should not share dishes, drinking glasses, cups, eating utensils, towels, or bedding with other people or pets in your home. After using these items, they should be washed thoroughly with soap and water. Clean all high-touch surfaces everyday  High touch surfaces include counters, tabletops, doorknobs, bathroom fixtures, toilets, phones, keyboards, tablets, and bedside tables. Also, clean any surfaces that may have blood, stool, or body fluids on them. Use a household cleaning spray or wipe, according to the label instructions. Labels contain instructions for safe and effective use of the cleaning product including precautions you should take when applying the product, such as wearing gloves and making sure you have good ventilation during use of the product. Monitor your symptoms  Seek prompt medical attention if your illness is worsening (e.g., difficulty breathing). Before seeking care, call your healthcare provider and tell them that you have, or are being evaluated for, COVID-19. Put on a facemask before you enter the facility. These steps will help the healthcare providers office to keep other people in the office or waiting room from getting infected or exposed. Ask your healthcare provider to call the local or state health department. Persons who are placed under active monitoring or facilitated self-monitoring should follow instructions provided by their local health department or occupational health professionals, as appropriate. When working with your local health department check their available hours. If you have a medical emergency and need to call 911, notify the dispatch personnel that you have, or are being evaluated for COVID-19. If possible, put on a facemask before emergency medical services arrive. Discontinuing home isolation  Patients with confirmed COVID-19 should remain under home isolation precautions until the risk of secondary transmission to others is thought to be low. The decision to discontinue home isolation precautions should be made on a case-by-case basis, in consultation with healthcare providers and state and local health departments. Patient Education           Skin Abscess: Care Instructions  Your Care Instructions     A skin abscess is a bacterial infection that forms a pocket of pus. A boil is a kind of skin abscess. The doctor may have cut an opening in the abscess so that the pus can drain out. You may have gauze in the cut so that the abscess will stay open and keep draining. You may need antibiotics. You will need to follow up with your doctor to make sure the infection has gone away. The doctor has checked you carefully, but problems can develop later. If you notice any problems or new symptoms, get medical treatment right away. Follow-up care is a key part of your treatment and safety. Be sure to make and go to all appointments, and call your doctor if you are having problems. It's also a good idea to know your test results and keep a list of the medicines you take.   How can you care for yourself at home? · Apply warm and dry compresses, a heating pad set on low, or a hot water bottle 3 or 4 times a day for pain. Keep a cloth between the heat source and your skin. · If your doctor prescribed antibiotics, take them as directed. Do not stop taking them just because you feel better. You need to take the full course of antibiotics. · Take pain medicines exactly as directed. ? If the doctor gave you a prescription medicine for pain, take it as prescribed. ? If you are not taking a prescription pain medicine, ask your doctor if you can take an over-the-counter medicine. · Keep your bandage clean and dry. Change the bandage whenever it gets wet or dirty, or at least one time a day. · If the abscess was packed with gauze:  ? Keep follow-up appointments to have the gauze changed or removed. If the doctor instructed you to remove the gauze, follow the instructions you were given for how to remove it. ? After the gauze is removed, soak the area in warm water for 15 to 20 minutes 2 times a day, until the wound closes. When should you call for help? Call your doctor now or seek immediate medical care if:  · You have signs of worsening infection, such as:  ? Increased pain, swelling, warmth, or redness. ? Red streaks leading from the infected skin. ? Pus draining from the wound. ? A fever. Watch closely for changes in your health, and be sure to contact your doctor if:  · You do not get better as expected. Where can you learn more? Go to http://marina-shana.info/  Enter W770 in the search box to learn more about \"Skin Abscess: Care Instructions. \"  Current as of: October 31, 2019               Content Version: 12.5  © 3375-7888 Healthwise, Incorporated. Care instructions adapted under license by [employer] (which disclaims liability or warranty for this information).  If you have questions about a medical condition or this instruction, always ask your healthcare professional. [address] any warranty or liability for your use of this information. DISCHARGE SUMMARY from Nurse PATIENT INSTRUCTIONS:    After general anesthesia or intravenous sedation, for 24 hours or while taking prescription Narcotics:  · Limit your activities  · Do not drive and operate hazardous machinery  · Do not make important personal or business decisions  · Do  not drink alcoholic beverages  · If you have not urinated within 8 hours after discharge, please contact your surgeon on call. Report the following to your surgeon:  · Excessive pain, swelling, redness or odor of or around the surgical area  · Temperature over 100.5  · Nausea and vomiting lasting longer than 4 hours or if unable to take medications  · Any signs of decreased circulation or nerve impairment to extremity: change in color, persistent  numbness, tingling, coldness or increase pain  · Any questions    What to do at Home:  Recommended activity: Activity as tolerated,     If you experience any of the following symptoms New or worse of nausea vomiting, increased pain, swelling, warmth, or redness, pus draining from the area fever, please follow up with primary care physician or return to Emergency Room . *  Please give a list of your current medications to your Primary Care Provider. *  Please update this list whenever your medications are discontinued, doses are      changed, or new medications (including over-the-counter products) are added. *  Please carry medication information at all times in case of emergency situations. These are general instructions for a healthy lifestyle:    No smoking/ No tobacco products/ Avoid exposure to second hand smoke  Surgeon General's Warning:  Quitting smoking now greatly reduces serious risk to your health.     Obesity, smoking, and sedentary lifestyle greatly increases your risk for illness    A healthy diet, regular physical exercise & weight monitoring are important for maintaining a healthy lifestyle    You may be retaining fluid if you have a history of heart failure or if you experience any of the following symptoms:  Weight gain of 3 pounds or more overnight or 5 pounds in a week, increased swelling in our hands or feet or shortness of breath while lying flat in bed. Please call your doctor as soon as you notice any of these symptoms; do not wait until your next office visit. The discharge information has been reviewed with the patient. The patient verbalized understanding. Discharge medications reviewed with the patient and appropriate educational materials and side effects teaching were provided. ___________________________________________________________________________________________________________________________________      MyChart Activation    Thank you for requesting access to [employer]. Please follow the instructions below to securely access and download your online medical record. [employer] allows you to send messages to your doctor, view your test results, renew your prescriptions, schedule appointments, and more. How Do I Sign Up? 1. In your internet browser, go to [email or web address]  2. Click on the First Time User? Click Here link in the Sign In box. You will be redirect to the New Member Sign Up page. 3. Enter your [employer] Access Code exactly as it appears below. You will not need to use this code after youve completed the sign-up process. If you do not sign up before the expiration date, you must request a new code. [employer] Access Code: [email or web address]  Expires: 2020  2:30 PM (This is the date your [employer] access code will )    4. Enter the last four digits of your Social Security Number (xxxx) and Date of Birth (mm/dd/yyyy) as indicated and click Submit. You will be taken to the next sign-up page. 5. Create a [employer] ID.  This will be your [employer] login ID and cannot be changed, so think of one that is secure and easy to remember. 6. Create a [employer] password. You can change your password at any time. 7. Enter your Password Reset Question and Answer. This can be used at a later time if you forget your password. 8. Enter your e-mail address. You will receive e-mail notification when new information is available in [address] 9. Click Sign Up. You can now view and download portions of your medical record. 10. Click the Download Summary menu link to download a portable copy of your medical information. Additional Information    If you have questions, please visit the Frequently Asked Questions section of the [employer] website at [email or web address] Remember, [employer] is NOT to be used for urgent needs. For medical emergencies, dial 911.

## 2020-08-06 NOTE — ROUTINE PROCESS
1905  Verbal shift change  Received from Da Walton RN (outgoing nurse), to DAYSI Brandon (oncoming)  Pt. Is AOX 4. IV patent and infusing well, Pt. denies  pain at this time. Report included the following information SBAR, Kardex, Procedure Summary, Intake/Output, MAR, Recent Lab Results. Will resume care and monitor Pt. Condition. 1935  Pt. Educated on call bell when in need of help and assistance. Pt. verbalized understanding. Pt. Head to toe Assessment Done and documented. 2130  Pt. Resting comfortably in bed, no sign of distress. Reinforced dressing to buttocks. 2300  Pt made no complaints. 0000 v/s are within normal limits. 0200  Pt. Resting comfortably in bed, no in distress. 0400  Pt. Eyes closed, easily awaken. 0600  Pt able to rest and sleep well throughout the shift. Verbal and bedside Shift changed report given to Burke Blake RN (oncoming RN) on Pt. Condition. Report consisted of patients Situation, History, Activities, intake/output,  Background, Assessment and Recommendations(SBAR). Information from the following report(s) Kardex, order Summary, Lab results and MAR was reviewed with the receiving nurse. Opportunity for questions and clarification was provided.

## 2020-08-06 NOTE — PROGRESS NOTES
[facility] [address] [place] [address]  [phone]  Colon and Rectal Surgery Progress Note      Patient: Tammie Munoz MRN: [de-identified]  SSN: [id number]    YOB: 1990  Age: 27 y.o. Sex: female      Admit Date: 8/3/2020    LOS: 2 days     Subjective:     No changes. Objective:     Vitals:    08/05/20 1935 08/06/20 0000 08/06/20 0400 08/06/20 0738   BP: 115/62 98/52 94/61 123/73   Pulse: 75 67 80 77   Resp: 18 18 18 18   Temp: 98.6 °F (37 °C) 98.2 °F (36.8 °C) 98.4 °F (36.9 °C) 98 °F (36.7 °C)   SpO2: 100% 100% 100% 100%   Weight:       Height:            Intake and Output:  Current Shift: No intake/output data recorded.   Last three shifts: 08/04 1901 - 08/06 0700  In: 1365 [I.V.:1365]  Out: 1000 [Urine:1000]    Physical Exam:     Intergluteal wound healthy, packing removed DSD applied    Lab/Data Review:    CMP:   Lab Results   Component Value Date/Time     08/06/2020 03:02 AM    K 4.5 08/06/2020 03:02 AM     08/06/2020 03:02 AM    CO2 25 08/06/2020 03:02 AM    AGAP 6 08/06/2020 03:02 AM     (H) 08/06/2020 03:02 AM    BUN 8 08/06/2020 03:02 AM    CREA 0.83 08/06/2020 03:02 AM    GFRAA >60 08/06/2020 03:02 AM    GFRNA >60 08/06/2020 03:02 AM    CA 8.1 (L) 08/06/2020 03:02 AM    ALB 2.3 (L) 08/06/2020 03:02 AM    TP 6.9 08/06/2020 03:02 AM    GLOB 4.6 (H) 08/06/2020 03:02 AM    AGRAT 0.5 (L) 08/06/2020 03:02 AM    ALT 11 (L) 08/06/2020 03:02 AM     CBC:   Lab Results   Component Value Date/Time    WBC 4.5 (L) 08/06/2020 03:02 AM    HGB 11.0 (L) 08/06/2020 03:02 AM    HCT 33.0 (L) 08/06/2020 03:02 AM     08/06/2020 03:02 AM        Cultures NGTD    Assessment:     Pilonidal abscess, s/p I&D  COVID +    Plan:     Continue zosyn  Diet as tolerated  COVID per medicine  D/C if ready from medical side  Abx and pain meds Rx done and sent to pt's pharmacy  F/U my office 3 weeks  DSD over wound all that is necessary, no packing, no home RN necessary    Signed By: German Mane MD        August 6, 2020

## 2020-08-06 NOTE — PROGRESS NOTES
Problem: Falls - Risk of  Goal: *Absence of Falls  Description: Document Martha Anderson Fall Risk and appropriate interventions in the flowsheet.   Outcome: Progressing Towards Goal  Note: Fall Risk Interventions:            Medication Interventions: Assess postural VS orthostatic hypotension, Patient to call before getting OOB, Teach patient to arise slowly                   Problem: Patient Education: Go to Patient Education Activity  Goal: Patient/Family Education  Outcome: Progressing Towards Goal     Problem: Surgical Pathway Day of Surgery  Goal: Nutrition/Diet  Outcome: Progressing Towards Goal  Goal: Medications  Outcome: Progressing Towards Goal  Goal: Respiratory  Outcome: Progressing Towards Goal  Goal: Treatments/Interventions/Procedures  Outcome: Progressing Towards Goal  Goal: Psychosocial  Outcome: Progressing Towards Goal

## 2020-08-08 LAB
BACTERIA SPEC CULT: ABNORMAL
GRAM STN SPEC: ABNORMAL
GRAM STN SPEC: ABNORMAL
SERVICE CMNT-IMP: ABNORMAL
SERVICE CMNT-IMP: ABNORMAL

## 2020-08-10 LAB
BACTERIA SPEC CULT: NORMAL
BACTERIA SPEC CULT: NORMAL
SERVICE CMNT-IMP: NORMAL
SERVICE CMNT-IMP: NORMAL

## 2020-08-14 ENCOUNTER — CLINICAL SUPPORT (OUTPATIENT)
Dept: FAMILY MEDICINE CLINIC | Facility: CLINIC | Age: 30
End: 2020-08-14

## 2020-08-14 ENCOUNTER — TELEPHONE (OUTPATIENT)
Dept: FAMILY MEDICINE CLINIC | Facility: CLINIC | Age: 30
End: 2020-08-14

## 2020-08-14 ENCOUNTER — HOSPITAL ENCOUNTER (OUTPATIENT)
Dept: LAB | Age: 30
Discharge: HOME | End: 2020-08-14
Payer: COMMERCIAL

## 2020-08-14 DIAGNOSIS — U07.1 COVID-19: Primary | ICD-10-CM

## 2020-08-14 DIAGNOSIS — U07.1 COVID-19: ICD-10-CM

## 2020-08-14 PROCEDURE — 87635 SARS-COV-2 COVID-19 AMP PRB: CPT

## 2020-08-16 LAB — SARS-COV-2, COV2NT: DETECTED

## 2020-08-18 DIAGNOSIS — U07.1 COVID-19: Primary | ICD-10-CM

## 2020-08-18 NOTE — PROGRESS NOTES
Please call, the Covid test is still positive. Would recheck on August 27. Is she symptomatic, and if not when would did the symptoms stop.

## 2020-08-24 NOTE — PROGRESS NOTES
TC was made to pt and pt verified name and d. o.b pt was made aware of positive Covid results pt states \" that she has not had any symptoms since about the August 15 th.

## 2020-08-27 ENCOUNTER — VIRTUAL VISIT (OUTPATIENT)
Dept: FAMILY MEDICINE CLINIC | Age: 30
End: 2020-08-27

## 2020-08-27 DIAGNOSIS — Z91.199 NO-SHOW FOR APPOINTMENT: Primary | ICD-10-CM

## 2020-08-27 NOTE — PROGRESS NOTES
Left message for patient to return call to the office. Need to do intake for appointment with  Provider. Will send out doxy link      Doxy link sent x 2  No return call to the office.

## 2020-09-05 NOTE — ED NOTES
The documentation for this period is being entered following the guidelines as defined in the [address] downtime policy by Kyler Cabrera RN. The patient is a 28y Male complaining of nose bleed.

## 2020-09-10 ENCOUNTER — OFFICE VISIT (OUTPATIENT)
Dept: SURGERY | Age: 30
End: 2020-09-10

## 2020-09-10 VITALS
HEART RATE: 84 BPM | DIASTOLIC BLOOD PRESSURE: 70 MMHG | SYSTOLIC BLOOD PRESSURE: 105 MMHG | OXYGEN SATURATION: 99 % | TEMPERATURE: 97.3 F | BODY MASS INDEX: 27.97 KG/M2 | RESPIRATION RATE: 17 BRPM | WEIGHT: 174 LBS | HEIGHT: 66 IN

## 2020-09-10 DIAGNOSIS — L05.01 PILONIDAL ABSCESS: Primary | ICD-10-CM

## 2020-09-10 NOTE — PROGRESS NOTES
Subjective: Doing well. Minimal drainage. Past medical history and ROS were reviewed and unchanged. Intergluteal cleft wounds healing well, Penrose drains removed, dry sterile dressing applied    Assessment / Plan    Status post I&D of pilonidal abscess  Follow-up in 2 months to evaluate the wound  Recommend pilonidal cystectomy if she is willing to proceed after this    A total of 15 minutes was spent with the patient, with >50% of time spent on counseling and coordination of care. The diagnoses and plan were discussed with patient. All questions answered. Plan of care agreed to by all concerned.

## 2020-11-01 ENCOUNTER — APPOINTMENT (OUTPATIENT)
Dept: GENERAL RADIOLOGY | Age: 30
End: 2020-11-01
Attending: NURSE PRACTITIONER
Payer: COMMERCIAL

## 2020-11-01 ENCOUNTER — HOSPITAL ENCOUNTER (EMERGENCY)
Age: 30
Discharge: HOME | End: 2020-11-01
Attending: EMERGENCY MEDICINE
Payer: COMMERCIAL

## 2020-11-01 VITALS
RESPIRATION RATE: 22 BRPM | WEIGHT: 130 LBS | BODY MASS INDEX: 20.89 KG/M2 | TEMPERATURE: 98.6 F | DIASTOLIC BLOOD PRESSURE: 73 MMHG | HEART RATE: 103 BPM | OXYGEN SATURATION: 100 % | SYSTOLIC BLOOD PRESSURE: 115 MMHG | HEIGHT: 66 IN

## 2020-11-01 DIAGNOSIS — J06.9 UPPER RESPIRATORY TRACT INFECTION, UNSPECIFIED TYPE: ICD-10-CM

## 2020-11-01 DIAGNOSIS — E87.6 HYPOKALEMIA: ICD-10-CM

## 2020-11-01 DIAGNOSIS — R06.2 WHEEZING: ICD-10-CM

## 2020-11-01 DIAGNOSIS — R05.9 COUGH: Primary | ICD-10-CM

## 2020-11-01 LAB
ALBUMIN SERPL-MCNC: 4.4 G/DL (ref 3.4–5)
ALBUMIN/GLOB SERPL: 0.9 {RATIO} (ref 0.8–1.7)
ALP SERPL-CCNC: 61 U/L (ref 45–117)
ALT SERPL-CCNC: 17 U/L (ref 13–56)
ANION GAP SERPL CALC-SCNC: 5 MMOL/L (ref 3–18)
AST SERPL-CCNC: 16 U/L (ref 10–38)
BASOPHILS # BLD: 0 K/UL (ref 0–0.1)
BASOPHILS NFR BLD: 0 % (ref 0–2)
BILIRUB SERPL-MCNC: 0.7 MG/DL (ref 0.2–1)
BUN SERPL-MCNC: 14 MG/DL (ref 7–18)
BUN/CREAT SERPL: 13 (ref 12–20)
CALCIUM SERPL-MCNC: 9.5 MG/DL (ref 8.5–10.1)
CHLORIDE SERPL-SCNC: 104 MMOL/L (ref 100–111)
CO2 SERPL-SCNC: 29 MMOL/L (ref 21–32)
CREAT SERPL-MCNC: 1.07 MG/DL (ref 0.6–1.3)
DIFFERENTIAL METHOD BLD: NORMAL
EOSINOPHIL # BLD: 0.2 K/UL (ref 0–0.4)
EOSINOPHIL NFR BLD: 3 % (ref 0–5)
ERYTHROCYTE [DISTWIDTH] IN BLOOD BY AUTOMATED COUNT: 13.3 % (ref 11.6–14.5)
GLOBULIN SER CALC-MCNC: 4.9 G/DL (ref 2–4)
GLUCOSE SERPL-MCNC: 105 MG/DL (ref 74–99)
HCT VFR BLD AUTO: 40.8 % (ref 35–45)
HGB BLD-MCNC: 14.5 G/DL (ref 12–16)
LYMPHOCYTES # BLD: 1.6 K/UL (ref 0.9–3.6)
LYMPHOCYTES NFR BLD: 30 % (ref 21–52)
MCH RBC QN AUTO: 30.1 PG (ref 24–34)
MCHC RBC AUTO-ENTMCNC: 35.5 G/DL (ref 31–37)
MCV RBC AUTO: 84.6 FL (ref 74–97)
MONOCYTES # BLD: 0.4 K/UL (ref 0.05–1.2)
MONOCYTES NFR BLD: 8 % (ref 3–10)
NEUTS SEG # BLD: 3.1 K/UL (ref 1.8–8)
NEUTS SEG NFR BLD: 59 % (ref 40–73)
PLATELET # BLD AUTO: 256 K/UL (ref 135–420)
PMV BLD AUTO: 9.7 FL (ref 9.2–11.8)
POTASSIUM SERPL-SCNC: 3.3 MMOL/L (ref 3.5–5.5)
PROT SERPL-MCNC: 9.3 G/DL (ref 6.4–8.2)
RBC # BLD AUTO: 4.82 M/UL (ref 4.2–5.3)
SODIUM SERPL-SCNC: 138 MMOL/L (ref 136–145)
WBC # BLD AUTO: 5.3 K/UL (ref 4.6–13.2)

## 2020-11-01 PROCEDURE — 87635 SARS-COV-2 COVID-19 AMP PRB: CPT

## 2020-11-01 PROCEDURE — 80053 COMPREHEN METABOLIC PANEL: CPT

## 2020-11-01 PROCEDURE — 85025 COMPLETE CBC W/AUTO DIFF WBC: CPT

## 2020-11-01 PROCEDURE — 99283 EMERGENCY DEPT VISIT LOW MDM: CPT

## 2020-11-01 PROCEDURE — 74011250637 HC RX REV CODE- 250/637: Performed by: NURSE PRACTITIONER

## 2020-11-01 PROCEDURE — 74011000250 HC RX REV CODE- 250: Performed by: NURSE PRACTITIONER

## 2020-11-01 PROCEDURE — 94640 AIRWAY INHALATION TREATMENT: CPT

## 2020-11-01 PROCEDURE — 71046 X-RAY EXAM CHEST 2 VIEWS: CPT

## 2020-11-01 RX ORDER — IPRATROPIUM BROMIDE AND ALBUTEROL SULFATE 2.5; .5 MG/3ML; MG/3ML
3 SOLUTION RESPIRATORY (INHALATION)
Status: COMPLETED | OUTPATIENT
Start: 2020-11-01 | End: 2020-11-01

## 2020-11-01 RX ORDER — DEXAMETHASONE SODIUM PHOSPHATE 4 MG/ML
10 INJECTION, SOLUTION INTRA-ARTICULAR; INTRALESIONAL; INTRAMUSCULAR; INTRAVENOUS; SOFT TISSUE
Status: COMPLETED | OUTPATIENT
Start: 2020-11-01 | End: 2020-11-01

## 2020-11-01 RX ORDER — DEXAMETHASONE SODIUM PHOSPHATE 4 MG/ML
10 INJECTION, SOLUTION INTRA-ARTICULAR; INTRALESIONAL; INTRAMUSCULAR; INTRAVENOUS; SOFT TISSUE ONCE
Status: DISCONTINUED | OUTPATIENT
Start: 2020-11-01 | End: 2020-11-01

## 2020-11-01 RX ORDER — POTASSIUM CHLORIDE 20 MEQ/1
40 TABLET, EXTENDED RELEASE ORAL
Status: COMPLETED | OUTPATIENT
Start: 2020-11-01 | End: 2020-11-01

## 2020-11-01 RX ORDER — BENZONATATE 100 MG/1
100 CAPSULE ORAL
Qty: 30 CAP | Refills: 0 | Status: SHIPPED | OUTPATIENT
Start: 2020-11-01 | End: 2020-11-08

## 2020-11-01 RX ORDER — IPRATROPIUM BROMIDE AND ALBUTEROL SULFATE 2.5; .5 MG/3ML; MG/3ML
6 SOLUTION RESPIRATORY (INHALATION)
Status: COMPLETED | OUTPATIENT
Start: 2020-11-01 | End: 2020-11-01

## 2020-11-01 RX ORDER — ALBUTEROL SULFATE 90 UG/1
2 AEROSOL, METERED RESPIRATORY (INHALATION)
Qty: 1 INHALER | Refills: 0 | Status: SHIPPED | OUTPATIENT
Start: 2020-11-01

## 2020-11-01 RX ADMIN — POTASSIUM CHLORIDE 40 MEQ: 1500 TABLET, EXTENDED RELEASE ORAL at 15:17

## 2020-11-01 RX ADMIN — IPRATROPIUM BROMIDE AND ALBUTEROL SULFATE 3 ML: .5; 3 SOLUTION RESPIRATORY (INHALATION) at 14:13

## 2020-11-01 RX ADMIN — DEXAMETHASONE SODIUM PHOSPHATE 10 MG: 4 INJECTION, SOLUTION INTRAMUSCULAR; INTRAVENOUS at 16:38

## 2020-11-01 RX ADMIN — IPRATROPIUM BROMIDE AND ALBUTEROL SULFATE 6 ML: .5; 3 SOLUTION RESPIRATORY (INHALATION) at 15:20

## 2020-11-01 NOTE — LETTER
[facility] EMERGENCY DEPT 
[id number] [facility] [address] [phone] Work/School Note Date: 11/1/2020 To Whom It May concern: 
 
Rudy Nichols was seen and treated today in the emergency room by the following provider(s): 
Attending Provider: Michell Sanders DO 
Nurse Practitioner: Felton Mcintosh NP. Rudy Nichols may return to work on 11/04/2020. Sincerely, Radhika Craig NP

## 2020-11-01 NOTE — DISCHARGE INSTRUCTIONS
Hypokalemia: Care Instructions  Your Care Instructions     Hypokalemia (say \"ux-ok-ibj-RHONDA-shay-uh\") is a low level of potassium. The heart, muscles, kidneys, and nervous system all need potassium to work well. This problem has many different causes. Kidney problems, diet, and medicines like diuretics and laxatives can cause it. So can vomiting or diarrhea. In some cases, cancer is the cause. Your doctor may do tests to find the cause of your low potassium levels. You may need medicines to bring your potassium levels back to normal. You may also need regular blood tests to check your potassium. If you have very low potassium, you may need intravenous (IV) medicines. You also may need tests to check the electrical activity of your heart. Heart problems caused by low potassium levels can be very serious. Follow-up care is a key part of your treatment and safety. Be sure to make and go to all appointments, and call your doctor if you are having problems. It's also a good idea to know your test results and keep a list of the medicines you take. How can you care for yourself at home? · If your doctor recommends it, eat foods that have a lot of potassium. These include fresh fruits, juices, and vegetables. They also include nuts, beans, and milk. · Be safe with medicines. If your doctor prescribes medicines or potassium supplements, take them exactly as directed. Call your doctor if you have any problems with your medicines. · Get your potassium levels tested as often as your doctor tells you. When should you call for help? Call 911 anytime you think you may need emergency care. For example, call if:    · You feel like your heart is missing beats. Heart problems caused by low potassium can cause death.     · You passed out (lost consciousness).     · You have a seizure. Call your doctor now or seek immediate medical care if:    · You feel weak or unusually tired.     · You have severe arm or leg cramps.   · You have tingling or numbness.     · You feel sick to your stomach, or you vomit.     · You have belly cramps.     · You feel bloated or constipated.     · You have to urinate a lot.     · You feel very thirsty most of the time.     · You are dizzy or lightheaded, or you feel like you may faint.     · You feel depressed, or you lose touch with reality. Watch closely for changes in your health, and be sure to contact your doctor if:    · You do not get better as expected. Where can you learn more? Go to http://marina-shana.info/  Enter G358 in the search box to learn more about \"Hypokalemia: Care Instructions. \"  Current as of: March 31, 2020               Content Version: 12.6  © 1469-7485 [employer]. Care instructions adapted under license by [employer] (which disclaims liability or warranty for this information). If you have questions about a medical condition or this instruction, always ask your healthcare professional. [place] [address] any warranty or liability for your use of this information. Patient Education   Learning About Coronavirus [phone])  Coronavirus [phone]): Overview  What is coronavirus ([id number]-77)? The coronavirus disease (COVID-19) is caused by a virus. It is an illness that was first found in [place], [place], in December 2019. It has since spread worldwide. The virus can cause fever, cough, and trouble breathing. In severe cases, it can cause pneumonia and make it hard to breathe without help. It can cause death. Coronaviruses are a large group of viruses. They cause the common cold. They also cause more serious illnesses like [place] East respiratory syndrome (MERS) and severe acute respiratory syndrome (SARS). COVID-19 is caused by a novel coronavirus. That means it's a new type that has not been seen in people before. This virus spreads person-to-person through droplets from coughing and sneezing.  It can also spread when you are close to someone who is infected. And it can spread when you touch something that has the virus on it, such as a doorknob or a tabletop. What can you do to protect yourself from coronavirus (COVID-19)? The best way to protect yourself from getting sick is to:  · Avoid areas where there is an outbreak. · Avoid contact with people who may be infected. · Wash your hands often with soap or alcohol-based hand sanitizers. · Avoid crowds and try to stay at least 6 feet away from other people. · Wash your hands often, especially after you cough or sneeze. Use soap and water, and scrub for at least 20 seconds. If soap and water aren't available, use an alcohol-based hand . · Avoid touching your mouth, nose, and eyes. What can you do to avoid spreading the virus to others? To help avoid spreading the virus to others:  · Cover your mouth with a tissue when you cough or sneeze. Then throw the tissue in the trash. · Use a disinfectant to clean things that you touch often. · Stay home if you are sick or have been exposed to the virus. Don't go to school, work, or public areas. And don't use public transportation. · If you are sick:  ? Leave your home only if you need to get medical care. But call the doctor's office first so they know you're coming. And wear a face mask, if you have one.  ? If you have a face mask, wear it whenever you're around other people. It can help stop the spread of the virus when you cough or sneeze. ? Clean and disinfect your home every day. Use household  and disinfectant wipes or sprays. Take special care to clean things that you grab with your hands. These include doorknobs, remote controls, phones, and handles on your refrigerator and microwave. And don't forget countertops, tabletops, bathrooms, and computer keyboards. When to call for help  Call 911 anytime you think you may need emergency care.  For example, call if:  · You have severe trouble breathing. (You can't talk at all.)  · You have constant chest pain or pressure. · You are severely dizzy or lightheaded. · You are confused or can't think clearly. · Your face and lips have a blue color. · You pass out (lose consciousness) or are very hard to wake up. Call your doctor now if you develop symptoms such as:  · Shortness of breath. · Fever. · Cough. If you need to get care, call ahead to the doctor's office for instructions before you go. Make sure you wear a face mask, if you have one, to prevent exposing other people to the virus. Where can you get the latest information? The following health organizations are tracking and studying this virus. Their websites contain the most up-to-date information. [place] also learn what to do if you think you may have been exposed to the virus. · U.[place]. Centers for Disease Control and Prevention (CDC): The CDC provides updated news about the disease and travel advice. The website also tells you how to prevent the spread of infection. www.cdc.gov  · World Health Organization [facility]): WHO offers information about the virus outbreaks. WHO also has travel advice. www.who.int  Current as of: April 1, 2020               Content Version: 12.4  © 2006-2020 [employer]. Care instructions adapted under license by your healthcare professional. If you have questions about a medical condition or this instruction, always ask your healthcare professional. [place] [address] any warranty or liability for your use of this information. Patient Education        Cough: Care Instructions  Your Care Instructions     A cough is your body's response to something that bothers your throat or airways. Many things can cause a cough. You might cough because of a cold or the flu, bronchitis, or asthma. Smoking, postnasal drip, allergies, and stomach acid that backs up into your throat also can cause coughs. A cough is a symptom, not a disease.  Most coughs stop when the cause, such as a cold, goes away. You can take a few steps at home to cough less and feel better. Follow-up care is a key part of your treatment and safety. Be sure to make and go to all appointments, and call your doctor if you are having problems. It's also a good idea to know your test results and keep a list of the medicines you take. How can you care for yourself at home? · Drink lots of water and other fluids. This helps thin the mucus and soothes a dry or sore throat. Honey or lemon juice in hot water or tea may ease a dry cough. · Take cough medicine as directed by your doctor. · Prop up your head on pillows to help you breathe and ease a dry cough. · Try cough drops to soothe a dry or sore throat. Cough drops don't stop a cough. Medicine-flavored cough drops are no better than candy-flavored drops or hard candy. · Do not smoke. Avoid secondhand smoke. If you need help quitting, talk to your doctor about stop-smoking programs and medicines. These can increase your chances of quitting for good. When should you call for help? Call 911 anytime you think you may need emergency care. For example, call if:    · You have severe trouble breathing. Call your doctor now or seek immediate medical care if:    · You cough up blood.     · You have new or worse trouble breathing.     · You have a new or higher fever.     · You have a new rash. Watch closely for changes in your health, and be sure to contact your doctor if:    · You cough more deeply or more often, especially if you notice more mucus or a change in the color of your mucus.     · You have new symptoms, such as a sore throat, an earache, or sinus pain.     · You do not get better as expected. Where can you learn more? Go to http://www.gray.com/  Enter D279 in the search box to learn more about \"Cough: Care Instructions. \"  Current as of: February 24, 2020               Content Version: 12.6  © 6181-4626 [employer]. Care instructions adapted under license by [employer] (which disclaims liability or warranty for this information). If you have questions about a medical condition or this instruction, always ask your healthcare professional. [address] any warranty or liability for your use of this information. Patient Education        Viral Respiratory Infection: Care Instructions  Your Care Instructions     Viruses are very small organisms. They grow in number after they enter your body. There are many types that cause different illnesses, such as colds and the mumps. The symptoms of a viral respiratory infection often start quickly. They include a fever, sore throat, and runny nose. You may also just not feel well. Or you may not want to eat much. Most viral respiratory infections are not serious. They usually get better with time and self-care. Antibiotics are not used to treat a viral infection. That's because antibiotics will not help cure a viral illness. In some cases, antiviral medicine can help your body fight a serious viral infection. Follow-up care is a key part of your treatment and safety. Be sure to make and go to all appointments, and call your doctor if you are having problems. It's also a good idea to know your test results and keep a list of the medicines you take. How can you care for yourself at home? · Rest as much as possible until you feel better. · Be safe with medicines. Take your medicine exactly as prescribed. Call your doctor if you think you are having a problem with your medicine. You will get more details on the specific medicine your doctor prescribes. · Take an over-the-counter pain medicine, such as acetaminophen (Tylenol), ibuprofen (Advil, Motrin), or naproxen (Aleve), as needed for pain and fever. Read and follow all instructions on the label. Do not give aspirin to anyone younger than 20.  It has been linked to Reye syndrome, a serious illness. · Drink plenty of fluids, enough so that your urine is light yellow or clear like water. Hot fluids, such as tea or soup, may help relieve congestion in your nose and throat. If you have kidney, heart, or liver disease and have to limit fluids, talk with your doctor before you increase the amount of fluids you drink. · Try to clear mucus from your lungs by breathing deeply and coughing. · Gargle with warm salt water once an hour. This can help reduce swelling and throat pain. Use 1 teaspoon of salt mixed in 1 cup of warm water. · Do not smoke or allow others to smoke around you. If you need help quitting, talk to your doctor about stop-smoking programs and medicines. These can increase your chances of quitting for good. To avoid spreading the virus  · Cough or sneeze into a tissue. Then throw the tissue away. · If you don't have a tissue, use your hand to cover your cough or sneeze. Then clean your hand. You can also cough into your sleeve. · Wash your hands often. Use soap and warm water. Wash for 15 to 20 seconds each time. · If you don't have soap and water near you, you can clean your hands with alcohol wipes or gel. When should you call for help? Call your doctor now or seek immediate medical care if:    · You have a new or higher fever.     · Your fever lasts more than 48 hours.     · You have trouble breathing.     · You have a fever with a stiff neck or a severe headache.     · You are sensitive to light.     · You feel very sleepy or confused. Watch closely for changes in your health, and be sure to contact your doctor if:    · You do not get better as expected. Where can you learn more? Go to http://www.gray.com/  Enter Q795 in the search box to learn more about \"Viral Respiratory Infection: Care Instructions. \"  Current as of: February 24, 2020               Content Version: 12.6  © 6284-0512 [email or web address]. Care instructions adapted under license by [employer] (which disclaims liability or warranty for this information). If you have questions about a medical condition or this instruction, always ask your healthcare professional. [address] any warranty or liability for your use of this information. Patient Education        Wheezing or Bronchoconstriction: Care Instructions  Your Care Instructions  Wheezing is a whistling noise made during breathing. It occurs when the small airways, or bronchial tubes, that lead to your lungs swell or contract (spasm) and become narrow. This narrowing is called bronchoconstriction. When your airways constrict, it is hard for air to pass through and this makes it hard for you to breathe. Wheezing and bronchoconstriction can be caused by many problems, including:  · An infection such as the flu or a cold. · Allergies such as hay fever. · Diseases such as asthma or chronic obstructive pulmonary disease. · Smoking. Treatment for your wheezing depends on what is causing the problem. Your wheezing may get better without treatment. But you may need to pay attention to things that cause your wheezing and avoid them. Or you may need medicine to help treat the wheezing and to reduce the swelling or to relieve spasms in your lungs. Follow-up care is a key part of your treatment and safety. Be sure to make and go to all appointments, and call your doctor if you are having problems. It is also a good idea to know your test results and keep a list of the medicines you take. How can you care for yourself at home? · Take your medicine exactly as prescribed. Call your doctor if you think you are having a problem with your medicine. You will get more details on the specific medicine your doctor prescribes. · If your doctor prescribed antibiotics, take them as directed. Do not stop taking them just because you feel better.  You need to take the full course of antibiotics. · Breathe moist air from a humidifier, hot shower, or sink filled with hot water. This may help ease your symptoms and make it easier for you to breathe. · If you have congestion in your nose and throat, drinking plenty of fluids, especially hot fluids, may help relieve your symptoms. If you have kidney, heart, or liver disease and have to limit fluids, talk with your doctor before you increase the amount of fluids you drink. · If you have mucus in your airways, it may help to breathe deeply and cough. · Do not smoke or allow others to smoke around you. Smoking can make your wheezing worse. If you need help quitting, talk to your doctor about stop-smoking programs and medicines. These can increase your chances of quitting for good. · Avoid things that may cause your wheezing. These may include colds, smoke, air pollution, dust, pollen, pets, cockroaches, stress, and cold air. When should you call for help? Call 911 anytime you think you may need emergency care. For example, call if:    · You have severe trouble breathing.     · You passed out (lost consciousness). Call your doctor now or seek immediate medical care if:    · You cough up yellow, dark brown, or bloody mucus (sputum).     · You have new or worse shortness of breath.     · Your wheezing is not getting better or it gets worse after you start taking your medicine. Watch closely for changes in your health, and be sure to contact your doctor if:    · You do not get better as expected. Where can you learn more? Go to http://www.gray.com/  Enter V454 in the search box to learn more about \"Wheezing or Bronchoconstriction: Care Instructions. \"  Current as of: February 24, 2020               Content Version: 12.6  © 9147-5578 [employer]. Care instructions adapted under license by [employer] (which disclaims liability or warranty for this information).  If you have questions about a medical condition or this instruction, always ask your healthcare professional. [address] any warranty or liability for your use of this information. Return to ED if symptoms do not improve. Return if you experience worsening shortness of breath. Follow up with PCP.

## 2020-11-02 ENCOUNTER — PATIENT OUTREACH (OUTPATIENT)
Dept: CASE MANAGEMENT | Age: 30
End: 2020-11-02

## 2020-11-02 NOTE — ED PROVIDER NOTES
EMERGENCY DEPARTMENT HISTORY AND PHYSICAL EXAM    7:23 PM      Date: 11/1/2020  Patient Name: Esequiel    History of Presenting Illness     No chief complaint on file. History Provided By: Patient    Additional History (Context): Esequiel is a 27 y.o. female with hx of COVID who presents with complain of wheezing, cough, nasal congestion for the past 2 days. Patient denies any history of asthma. Patient did report that when she was little she used to have an inhaler at home but was never told she had asthma. Patient states she is an every day cigarette smoker. Patient denies having any fevers, chest pain, chills, body aches. Patient denies nausea, vomiting, abdominal pain. Denies any dizziness or headaches. Patient denies any recent travel. PCP: None    Current Outpatient Medications   Medication Sig Dispense Refill    albuterol (PROVENTIL HFA, VENTOLIN HFA, PROAIR HFA) 90 mcg/actuation inhaler Take 2 Puffs by inhalation every four (4) hours as needed for Wheezing. 1 Inhaler 0    benzonatate (Tessalon Perles) 100 mg capsule Take 1 Cap by mouth three (3) times daily as needed for Cough for up to 7 days. 30 Cap 0       Past History     Past Medical History:  Past Medical History:   Diagnosis Date    Miscarriage        Past Surgical History:  Past Surgical History:   Procedure Laterality Date    HX DILATION AND CURETTAGE  2013       Family History:  Family History   Problem Relation Age of Onset    Colon Cancer Paternal Grandfather        Social History:  Social History     Tobacco Use    Smoking status: Current Every Day Smoker    Smokeless tobacco: Never Used   Substance Use Topics    Alcohol use: No    Drug use: No       Allergies:  No Known Allergies      Review of Systems       Review of Systems   Constitutional: Negative for chills and fever. Respiratory: Positive for cough and wheezing. Negative for shortness of breath.     Cardiovascular: Negative for chest pain.   Gastrointestinal: Negative for abdominal pain, nausea and vomiting. Skin: Negative for rash. Neurological: Negative for weakness. All other systems reviewed and are negative. Physical Exam     Visit Vitals  /73 (BP 1 Location: Left arm, BP Patient Position: At rest)   Pulse (!) 103   Temp 98.6 °F (37 °C)   Resp 22   Ht 5' 6\" (1.676 m)   Wt 59 kg (130 lb)   SpO2 100%   BMI 20.98 kg/m²         Physical Exam  Vitals signs reviewed. Constitutional:       General: She is not in acute distress. Appearance: Normal appearance. She is well-developed. She is not ill-appearing or toxic-appearing. HENT:      Head: Normocephalic and atraumatic. Eyes:      Conjunctiva/sclera: Conjunctivae normal.      Pupils: Pupils are equal, round, and reactive to light. Neck:      Musculoskeletal: Normal range of motion. Trachea: Trachea normal.   Cardiovascular:      Rate and Rhythm: Normal rate and regular rhythm. Pulmonary:      Effort: Pulmonary effort is normal.      Breath sounds: Wheezing present. Comments: Bilateral inspiratory wheezing. Abdominal:      General: Bowel sounds are normal. There is no distension or abdominal bruit. Palpations: Abdomen is soft. Abdomen is not rigid. There is no shifting dullness, fluid wave, mass or pulsatile mass. Tenderness: There is no abdominal tenderness. There is no guarding. Negative signs include Wilson's sign and McBurney's sign. Comments: No abdominal tenderness. Skin:     Comments: No cyanosis. Neurological:      General: No focal deficit present. Mental Status: She is alert and oriented to person, place, and time. Mental status is at baseline.            Diagnostic Study Results     Labs -  Recent Results (from the past 12 hour(s))   CBC WITH AUTOMATED DIFF    Collection Time: 11/01/20  2:18 PM   Result Value Ref Range    WBC 5.3 4.6 - 13.2 K/uL    RBC 4.82 4.20 - 5.30 M/uL    HGB 14.5 12.0 - 16.0 g/dL    HCT 40.8 35.0 - 45.0 %    MCV 84.6 74.0 - 97.0 FL    MCH 30.1 24.0 - 34.0 PG    MCHC 35.5 31.0 - 37.0 g/dL    RDW 13.3 11.6 - 14.5 %    PLATELET 854 302 - 130 K/uL    MPV 9.7 9.2 - 11.8 FL    NEUTROPHILS 59 40 - 73 %    LYMPHOCYTES 30 21 - 52 %    MONOCYTES 8 3 - 10 %    EOSINOPHILS 3 0 - 5 %    BASOPHILS 0 0 - 2 %    ABS. NEUTROPHILS 3.1 1.8 - 8.0 K/UL    ABS. LYMPHOCYTES 1.6 0.9 - 3.6 K/UL    ABS. MONOCYTES 0.4 0.05 - 1.2 K/UL    ABS. EOSINOPHILS 0.2 0.0 - 0.4 K/UL    ABS. BASOPHILS 0.0 0.0 - 0.1 K/UL    DF AUTOMATED     METABOLIC PANEL, COMPREHENSIVE    Collection Time: 11/01/20  2:18 PM   Result Value Ref Range    Sodium 138 136 - 145 mmol/L    Potassium 3.3 (L) 3.5 - 5.5 mmol/L    Chloride 104 100 - 111 mmol/L    CO2 29 21 - 32 mmol/L    Anion gap 5 3.0 - 18 mmol/L    Glucose 105 (H) 74 - 99 mg/dL    BUN 14 7.0 - 18 MG/DL    Creatinine 1.07 0.6 - 1.3 MG/DL    BUN/Creatinine ratio 13 12 - 20      GFR est AA >60 >60 ml/min/1.73m2    GFR est non-AA >60 >60 ml/min/1.73m2    Calcium 9.5 8.5 - 10.1 MG/DL    Bilirubin, total 0.7 0.2 - 1.0 MG/DL    ALT (SGPT) 17 13 - 56 U/L    AST (SGOT) 16 10 - 38 U/L    Alk. phosphatase 61 45 - 117 U/L    Protein, total 9.3 (H) 6.4 - 8.2 g/dL    Albumin 4.4 3.4 - 5.0 g/dL    Globulin 4.9 (H) 2.0 - 4.0 g/dL    A-G Ratio 0.9 0.8 - 1.7         Radiologic Studies -   XR CHEST PA LAT   Final Result   IMPRESSION:     1. Unremarkable chest                      Medical Decision Making   I am the first provider for this patient. I reviewed the vital signs, available nursing notes, past medical history, past surgical history, family history and social history. Vital Signs-Reviewed the patient's vital signs. Records Reviewed: Nursing Notes and Old Medical Records    ED Course: Progress Notes, Reevaluation, and Consults:      Provider Notes (Medical Decision Making):  27 y.o. female with hx of COVID who presents with complain of wheezing, cough, nasal congestion for the past 2 days.   Patient denies any history of asthma. Patient did report that when she was little she used to have an inhaler at home but was never told she had asthma. Patient states she is an every day cigarette smoker. Patient denies having any fevers, chest pain, chills, body aches. Patient denies nausea, vomiting, abdominal pain. Denies any dizziness or headaches. Patient denies any recent travel. Pt's xray negative for pneumonia. Pt has bilateral expiratory wheezes. Pt not hypoxic during visit. Denies recent travels. I do not suspect PE or cardiac etiology at this time. Patient given multiple breathing treatments during visit and decadron during her visit which improved her symptoms. Pt was able to ambulate without difficulty. She was not hypoxic during ambulation, she maintained O2 between 98-99 during ambulation. Pt advised to follow up with PCP for further evaluation and pulmonologist referral. Pt advised to return to ED if her symptoms worsen. Pt was in no acute distress prior discharge. I suspect patient's symptoms may be due to URI and onset of asthma. Diagnosis     Clinical Impression:   1. Cough    2. Wheezing    3. Upper respiratory tract infection, unspecified type    4.  Hypokalemia        Disposition: home     Follow-up Information     Follow up With Specialties Details Why [address]    [facility] EMERGENCY DEPT Emergency Medicine  If symptoms worsen [address] [phone]  [address]  Schedule an appointment as soon as possible for a visit in 1 day  [address]  [address]           Discharge Medication List as of 11/1/2020  5:05 PM      START taking these medications    Details   albuterol (PROVENTIL HFA, VENTOLIN HFA, PROAIR HFA) 90 mcg/actuation inhaler Take 2 Puffs by inhalation every four (4) hours as needed for Wheezing., Normal, Disp-1 Inhaler,R-0      benzonatate (Tessalon Perles) 100 mg capsule Take 1 Cap by mouth three (3) times daily as needed for Cough for up to 7 days. , Normal, Disp-30 Cap,R-0             Dictation disclaimer:  Please note that this dictation was completed with [employer], the computer voice recognition software. Quite often unanticipated grammatical, syntax, homophones, and other interpretive errors are inadvertently transcribed by the computer software. Please disregard these errors. Please excuse any errors that have escaped final proofreading.

## 2020-11-02 NOTE — PROGRESS NOTES
Date/Time:  11/2/2020 10:51 AM   Call within 2 business days of discharge: Yes   Attempted to reach patient by telephone. Left HIPPA compliant message requesting a return call. Will attempt to reach patient again.

## 2020-11-03 LAB — SARS-COV-2, COV2NT: NOT DETECTED

## 2020-11-05 ENCOUNTER — PATIENT OUTREACH (OUTPATIENT)
Dept: CASE MANAGEMENT | Age: 30
End: 2020-11-05

## 2020-11-05 NOTE — PROGRESS NOTES
Date/Time:  11/5/2020 4:23 PM   Attempted to reach patient by telephone. Left HIPPA compliant message requesting a return call. This is second attempt to contact patient. Episode resolved.

## 2021-02-20 ENCOUNTER — HOSPITAL ENCOUNTER (EMERGENCY)
Age: 31
Discharge: ANOTHER HOSPITAL | End: 2021-02-21
Attending: EMERGENCY MEDICINE
Payer: COMMERCIAL

## 2021-02-20 ENCOUNTER — HOSPITAL ENCOUNTER (EMERGENCY)
Age: 31
Discharge: HOME | End: 2021-02-20
Attending: EMERGENCY MEDICINE
Payer: COMMERCIAL

## 2021-02-20 VITALS
HEIGHT: 66 IN | DIASTOLIC BLOOD PRESSURE: 71 MMHG | RESPIRATION RATE: 18 BRPM | HEART RATE: 69 BPM | BODY MASS INDEX: 22.5 KG/M2 | WEIGHT: 140 LBS | SYSTOLIC BLOOD PRESSURE: 127 MMHG | TEMPERATURE: 98.1 F | OXYGEN SATURATION: 100 %

## 2021-02-20 DIAGNOSIS — O21.9 NAUSEA AND VOMITING DURING PREGNANCY: Primary | ICD-10-CM

## 2021-02-20 DIAGNOSIS — O21.9 NAUSEA/VOMITING IN PREGNANCY: Primary | ICD-10-CM

## 2021-02-20 LAB
ALBUMIN SERPL-MCNC: 4 G/DL (ref 3.4–5)
ALBUMIN/GLOB SERPL: 1 {RATIO} (ref 0.8–1.7)
ALP SERPL-CCNC: 48 U/L (ref 45–117)
ALT SERPL-CCNC: 15 U/L (ref 13–56)
ANION GAP SERPL CALC-SCNC: 12 MMOL/L (ref 3–18)
ANION GAP SERPL CALC-SCNC: 7 MMOL/L (ref 3–18)
APPEARANCE UR: ABNORMAL
AST SERPL-CCNC: 20 U/L (ref 10–38)
BACTERIA URNS QL MICRO: ABNORMAL /HPF
BASOPHILS # BLD: 0 K/UL (ref 0–0.1)
BASOPHILS # BLD: 0 K/UL (ref 0–0.1)
BASOPHILS NFR BLD: 0 % (ref 0–2)
BASOPHILS NFR BLD: 0 % (ref 0–2)
BILIRUB SERPL-MCNC: 0.5 MG/DL (ref 0.2–1)
BILIRUB UR QL: NEGATIVE
BUN SERPL-MCNC: 10 MG/DL (ref 7–18)
BUN SERPL-MCNC: 12 MG/DL (ref 7–18)
BUN/CREAT SERPL: 12 (ref 12–20)
BUN/CREAT SERPL: 14 (ref 12–20)
CALCIUM SERPL-MCNC: 9 MG/DL (ref 8.5–10.1)
CALCIUM SERPL-MCNC: 9.1 MG/DL (ref 8.5–10.1)
CHLORIDE SERPL-SCNC: 105 MMOL/L (ref 100–111)
CHLORIDE SERPL-SCNC: 106 MMOL/L (ref 100–111)
CO2 SERPL-SCNC: 21 MMOL/L (ref 21–32)
CO2 SERPL-SCNC: 26 MMOL/L (ref 21–32)
COLOR UR: YELLOW
CREAT SERPL-MCNC: 0.82 MG/DL (ref 0.6–1.3)
CREAT SERPL-MCNC: 0.84 MG/DL (ref 0.6–1.3)
DIFFERENTIAL METHOD BLD: ABNORMAL
DIFFERENTIAL METHOD BLD: ABNORMAL
EOSINOPHIL # BLD: 0 K/UL (ref 0–0.4)
EOSINOPHIL # BLD: 0 K/UL (ref 0–0.4)
EOSINOPHIL NFR BLD: 0 % (ref 0–5)
EOSINOPHIL NFR BLD: 0 % (ref 0–5)
EPITH CASTS URNS QL MICRO: ABNORMAL /LPF (ref 0–5)
ERYTHROCYTE [DISTWIDTH] IN BLOOD BY AUTOMATED COUNT: 12.7 % (ref 11.6–14.5)
ERYTHROCYTE [DISTWIDTH] IN BLOOD BY AUTOMATED COUNT: 12.9 % (ref 11.6–14.5)
GLOBULIN SER CALC-MCNC: 4 G/DL (ref 2–4)
GLUCOSE SERPL-MCNC: 106 MG/DL (ref 74–99)
GLUCOSE SERPL-MCNC: 149 MG/DL (ref 74–99)
GLUCOSE UR STRIP.AUTO-MCNC: NEGATIVE MG/DL
HCG UR QL: POSITIVE
HCT VFR BLD AUTO: 37.6 % (ref 35–45)
HCT VFR BLD AUTO: 38.3 % (ref 35–45)
HGB BLD-MCNC: 12.8 G/DL (ref 12–16)
HGB BLD-MCNC: 13.3 G/DL (ref 12–16)
HGB UR QL STRIP: NEGATIVE
KETONES UR QL STRIP.AUTO: >160 MG/DL
LEUKOCYTE ESTERASE UR QL STRIP.AUTO: ABNORMAL
LYMPHOCYTES # BLD: 0.6 K/UL (ref 0.9–3.6)
LYMPHOCYTES # BLD: 0.9 K/UL (ref 0.9–3.6)
LYMPHOCYTES NFR BLD: 17 % (ref 21–52)
LYMPHOCYTES NFR BLD: 8 % (ref 21–52)
MCH RBC QN AUTO: 29.8 PG (ref 24–34)
MCH RBC QN AUTO: 30.4 PG (ref 24–34)
MCHC RBC AUTO-ENTMCNC: 34 G/DL (ref 31–37)
MCHC RBC AUTO-ENTMCNC: 34.7 G/DL (ref 31–37)
MCV RBC AUTO: 87.4 FL (ref 74–97)
MCV RBC AUTO: 87.4 FL (ref 74–97)
MONOCYTES # BLD: 0.2 K/UL (ref 0.05–1.2)
MONOCYTES # BLD: 0.3 K/UL (ref 0.05–1.2)
MONOCYTES NFR BLD: 3 % (ref 3–10)
MONOCYTES NFR BLD: 6 % (ref 3–10)
MUCOUS THREADS URNS QL MICRO: ABNORMAL /LPF
NEUTS SEG # BLD: 3.9 K/UL (ref 1.8–8)
NEUTS SEG # BLD: 6.1 K/UL (ref 1.8–8)
NEUTS SEG NFR BLD: 77 % (ref 40–73)
NEUTS SEG NFR BLD: 89 % (ref 40–73)
NITRITE UR QL STRIP.AUTO: NEGATIVE
PH UR STRIP: 8 [PH] (ref 5–8)
PLATELET # BLD AUTO: 222 K/UL (ref 135–420)
PLATELET # BLD AUTO: 254 K/UL (ref 135–420)
PMV BLD AUTO: 10.2 FL (ref 9.2–11.8)
PMV BLD AUTO: 9.8 FL (ref 9.2–11.8)
POTASSIUM SERPL-SCNC: 3.7 MMOL/L (ref 3.5–5.5)
POTASSIUM SERPL-SCNC: 3.9 MMOL/L (ref 3.5–5.5)
PROT SERPL-MCNC: 8 G/DL (ref 6.4–8.2)
PROT UR STRIP-MCNC: 30 MG/DL
RBC # BLD AUTO: 4.3 M/UL (ref 4.2–5.3)
RBC # BLD AUTO: 4.38 M/UL (ref 4.2–5.3)
RBC #/AREA URNS HPF: ABNORMAL /HPF (ref 0–5)
SODIUM SERPL-SCNC: 138 MMOL/L (ref 136–145)
SODIUM SERPL-SCNC: 139 MMOL/L (ref 136–145)
SP GR UR REFRACTOMETRY: >1.03 (ref 1–1.03)
UROBILINOGEN UR QL STRIP.AUTO: 1 EU/DL (ref 0.2–1)
WBC # BLD AUTO: 5.1 K/UL (ref 4.6–13.2)
WBC # BLD AUTO: 6.9 K/UL (ref 4.6–13.2)
WBC URNS QL MICRO: ABNORMAL /HPF (ref 0–4)

## 2021-02-20 PROCEDURE — 87186 SC STD MICRODIL/AGAR DIL: CPT

## 2021-02-20 PROCEDURE — 87086 URINE CULTURE/COLONY COUNT: CPT

## 2021-02-20 PROCEDURE — 96375 TX/PRO/DX INJ NEW DRUG ADDON: CPT

## 2021-02-20 PROCEDURE — 87077 CULTURE AEROBIC IDENTIFY: CPT

## 2021-02-20 PROCEDURE — 81001 URINALYSIS AUTO W/SCOPE: CPT

## 2021-02-20 PROCEDURE — 74011250636 HC RX REV CODE- 250/636: Performed by: EMERGENCY MEDICINE

## 2021-02-20 PROCEDURE — 96374 THER/PROPH/DIAG INJ IV PUSH: CPT

## 2021-02-20 PROCEDURE — 96361 HYDRATE IV INFUSION ADD-ON: CPT

## 2021-02-20 PROCEDURE — 80053 COMPREHEN METABOLIC PANEL: CPT

## 2021-02-20 PROCEDURE — 99284 EMERGENCY DEPT VISIT MOD MDM: CPT

## 2021-02-20 PROCEDURE — 99283 EMERGENCY DEPT VISIT LOW MDM: CPT

## 2021-02-20 PROCEDURE — 85025 COMPLETE CBC W/AUTO DIFF WBC: CPT

## 2021-02-20 PROCEDURE — 81025 URINE PREGNANCY TEST: CPT

## 2021-02-20 PROCEDURE — 74011000258 HC RX REV CODE- 258: Performed by: EMERGENCY MEDICINE

## 2021-02-20 PROCEDURE — 96376 TX/PRO/DX INJ SAME DRUG ADON: CPT

## 2021-02-20 RX ORDER — PYRIDOXINE HCL (VITAMIN B6) 25 MG
25 TABLET ORAL EVERY 8 HOURS
Qty: 30 TAB | Refills: 0 | Status: SHIPPED | OUTPATIENT
Start: 2021-02-20 | End: 2021-03-02

## 2021-02-20 RX ORDER — ONDANSETRON 2 MG/ML
4 INJECTION INTRAMUSCULAR; INTRAVENOUS
Status: DISCONTINUED | OUTPATIENT
Start: 2021-02-20 | End: 2021-02-20

## 2021-02-20 RX ORDER — PROMETHAZINE HYDROCHLORIDE 25 MG/1
25 TABLET ORAL
Qty: 12 TAB | Refills: 0 | Status: ON HOLD | OUTPATIENT
Start: 2021-02-20 | End: 2021-02-21 | Stop reason: SINTOL

## 2021-02-20 RX ORDER — CEPHALEXIN 500 MG/1
500 CAPSULE ORAL 4 TIMES DAILY
Qty: 28 CAP | Refills: 0 | Status: ON HOLD | OUTPATIENT
Start: 2021-02-20 | End: 2021-02-23 | Stop reason: SDUPTHER

## 2021-02-20 RX ORDER — DIPHENHYDRAMINE HYDROCHLORIDE 50 MG/ML
25 INJECTION, SOLUTION INTRAMUSCULAR; INTRAVENOUS ONCE
Status: DISCONTINUED | OUTPATIENT
Start: 2021-02-20 | End: 2021-02-20

## 2021-02-20 RX ADMIN — SODIUM CHLORIDE 1000 ML: 900 INJECTION, SOLUTION INTRAVENOUS at 09:08

## 2021-02-20 RX ADMIN — SODIUM CHLORIDE 25 MG: 900 INJECTION, SOLUTION INTRAVENOUS at 23:03

## 2021-02-20 RX ADMIN — SODIUM CHLORIDE 1000 ML: 900 INJECTION, SOLUTION INTRAVENOUS at 23:03

## 2021-02-20 RX ADMIN — SODIUM CHLORIDE 25 MG: 900 INJECTION, SOLUTION INTRAVENOUS at 09:08

## 2021-02-20 NOTE — ED TRIAGE NOTES
Pt reports approx 5 weeks pregnant, unconfirmed, states having nausea and vomiting. States LMP 1/11. States intermittent abdominal pain. Pt presents in NAD, without active vomiting.

## 2021-02-20 NOTE — ED PROVIDER NOTES
HPI patient presents today stating she is having nausea of pregnancy. She says she is between 6 to 8 weeks pregnant and she is been experiencing severe nausea for several days. She says today she is not able to keep anything down. She says she is had similar symptoms with previous pregnancy. She says her physician gave her a prescription for nausea medicine previously but she is not able to keep that down on her stomach today. She denies any fever or chills. She denies abdominal pain vaginal bleeding or changes in bowel or bladder habits. No other complaints given this time.     Past Medical History:   Diagnosis Date    Miscarriage        Past Surgical History:   Procedure Laterality Date    HX DILATION AND CURETTAGE  2013         Family History:   Problem Relation Age of Onset    Colon Cancer Paternal Grandfather        Social History     Socioeconomic History    Marital status: SINGLE     Spouse name: Not on file    Number of children: Not on file    Years of education: Not on file    Highest education level: Not on file   Occupational History    Not on file   Social Needs    Financial resource strain: Not on file    Food insecurity     Worry: Not on file     Inability: Not on file    Transportation needs     Medical: Not on file     Non-medical: Not on file   Tobacco Use    Smoking status: Current Every Day Smoker    Smokeless tobacco: Never Used   Substance and Sexual Activity    Alcohol use: No    Drug use: No    Sexual activity: Yes     Partners: Male     Birth control/protection: Condom   Lifestyle    Physical activity     Days per week: Not on file     Minutes per session: Not on file    Stress: Not on file   Relationships    Social connections     Talks on phone: Not on file     Gets together: Not on file     Attends Quaker service: Not on file     Active member of club or organization: Not on file     Attends meetings of clubs or organizations: Not on file     Relationship status: Not on file    Intimate partner violence     Fear of current or ex partner: Not on file     Emotionally abused: Not on file     Physically abused: Not on file     Forced sexual activity: Not on file   Other Topics Concern    Not on file   Social History Narrative    Not on file         ALLERGIES: Patient has no known allergies. Review of Systems   Constitutional: Negative. HENT: Negative. Respiratory: Negative. Cardiovascular: Negative. Gastrointestinal: Positive for nausea and vomiting. Genitourinary: Negative. Musculoskeletal: Negative. Skin: Negative. Neurological: Negative. Psychiatric/Behavioral: Negative. There were no vitals filed for this visit. Physical Exam  Vitals signs and nursing note reviewed. Constitutional:       Appearance: She is well-developed. HENT:      Head: Normocephalic and atraumatic. Nose: Nose normal.      Mouth/Throat:      Mouth: Mucous membranes are moist.   Eyes:      Conjunctiva/sclera: Conjunctivae normal.      Pupils: Pupils are equal, round, and reactive to light. Neck:      Musculoskeletal: Normal range of motion and neck supple. Cardiovascular:      Rate and Rhythm: Normal rate and regular rhythm. Pulmonary:      Effort: Pulmonary effort is normal.      Breath sounds: Normal breath sounds. Abdominal:      Palpations: Abdomen is soft. Musculoskeletal: Normal range of motion. Skin:     General: Skin is warm and dry. Neurological:      Mental Status: She is alert and oriented to person, place, and time. MDM  Number of Diagnoses or Management Options  Diagnosis management comments: Patient is feeling better after IV fluids and meds. She understands and agrees with the disposition and follow-up plan.   Chiquis Shannon MD 10:30 AM         Procedures

## 2021-02-21 ENCOUNTER — HOSPITAL ENCOUNTER (INPATIENT)
Age: 31
LOS: 2 days | Discharge: HOME | DRG: 566 | End: 2021-02-23
Attending: OBSTETRICS & GYNECOLOGY | Admitting: OBSTETRICS & GYNECOLOGY
Payer: COMMERCIAL

## 2021-02-21 PROBLEM — O21.0 HYPEREMESIS AFFECTING PREGNANCY, ANTEPARTUM: Status: ACTIVE | Noted: 2021-02-21

## 2021-02-21 LAB
ALBUMIN SERPL-MCNC: 3.5 G/DL (ref 3.4–5)
ALBUMIN/GLOB SERPL: 1.1 {RATIO} (ref 0.8–1.7)
ALP SERPL-CCNC: 40 U/L (ref 45–117)
ALT SERPL-CCNC: 15 U/L (ref 13–56)
ANION GAP SERPL CALC-SCNC: 8 MMOL/L (ref 3–18)
AST SERPL-CCNC: 10 U/L (ref 10–38)
BILIRUB SERPL-MCNC: 0.6 MG/DL (ref 0.2–1)
BUN SERPL-MCNC: 11 MG/DL (ref 7–18)
BUN/CREAT SERPL: 15 (ref 12–20)
CALCIUM SERPL-MCNC: 8.4 MG/DL (ref 8.5–10.1)
CHLORIDE SERPL-SCNC: 106 MMOL/L (ref 100–111)
CO2 SERPL-SCNC: 25 MMOL/L (ref 21–32)
CREAT SERPL-MCNC: 0.74 MG/DL (ref 0.6–1.3)
GLOBULIN SER CALC-MCNC: 3.2 G/DL (ref 2–4)
GLUCOSE SERPL-MCNC: 99 MG/DL (ref 74–99)
HCG SERPL-ACNC: ABNORMAL MIU/ML (ref 0–10)
POTASSIUM SERPL-SCNC: 3.4 MMOL/L (ref 3.5–5.5)
PROT SERPL-MCNC: 6.7 G/DL (ref 6.4–8.2)
SODIUM SERPL-SCNC: 139 MMOL/L (ref 136–145)
T4 FREE SERPL-MCNC: 1.2 NG/DL (ref 0.7–1.5)
TSH SERPL DL<=0.05 MIU/L-ACNC: 0.2 UIU/ML (ref 0.36–3.74)

## 2021-02-21 PROCEDURE — 84443 ASSAY THYROID STIM HORMONE: CPT

## 2021-02-21 PROCEDURE — 74011250636 HC RX REV CODE- 250/636: Performed by: OBSTETRICS & GYNECOLOGY

## 2021-02-21 PROCEDURE — 80053 COMPREHEN METABOLIC PANEL: CPT

## 2021-02-21 PROCEDURE — 65270000029 HC RM PRIVATE

## 2021-02-21 PROCEDURE — 74011250636 HC RX REV CODE- 250/636: Performed by: EMERGENCY MEDICINE

## 2021-02-21 PROCEDURE — 74011000258 HC RX REV CODE- 258: Performed by: EMERGENCY MEDICINE

## 2021-02-21 PROCEDURE — 84439 ASSAY OF FREE THYROXINE: CPT

## 2021-02-21 PROCEDURE — 74011250637 HC RX REV CODE- 250/637: Performed by: EMERGENCY MEDICINE

## 2021-02-21 PROCEDURE — 84702 CHORIONIC GONADOTROPIN TEST: CPT

## 2021-02-21 PROCEDURE — 36415 COLL VENOUS BLD VENIPUNCTURE: CPT

## 2021-02-21 RX ORDER — SODIUM CHLORIDE, SODIUM LACTATE, POTASSIUM CHLORIDE, CALCIUM CHLORIDE 600; 310; 30; 20 MG/100ML; MG/100ML; MG/100ML; MG/100ML
150 INJECTION, SOLUTION INTRAVENOUS CONTINUOUS
Status: DISCONTINUED | OUTPATIENT
Start: 2021-02-21 | End: 2021-02-22

## 2021-02-21 RX ORDER — DIPHENHYDRAMINE HYDROCHLORIDE 50 MG/ML
25 INJECTION, SOLUTION INTRAMUSCULAR; INTRAVENOUS ONCE
Status: COMPLETED | OUTPATIENT
Start: 2021-02-21 | End: 2021-02-21

## 2021-02-21 RX ORDER — DEXTROSE MONOHYDRATE AND SODIUM CHLORIDE 5; .45 G/100ML; G/100ML
100 INJECTION, SOLUTION INTRAVENOUS CONTINUOUS
Status: DISCONTINUED | OUTPATIENT
Start: 2021-02-21 | End: 2021-02-21 | Stop reason: HOSPADM

## 2021-02-21 RX ORDER — METOCLOPRAMIDE HYDROCHLORIDE 5 MG/ML
5 INJECTION INTRAMUSCULAR; INTRAVENOUS
Status: COMPLETED | OUTPATIENT
Start: 2021-02-21 | End: 2021-02-21

## 2021-02-21 RX ORDER — ONDANSETRON 2 MG/ML
4 INJECTION INTRAMUSCULAR; INTRAVENOUS
Status: DISCONTINUED | OUTPATIENT
Start: 2021-02-21 | End: 2021-02-23 | Stop reason: HOSPADM

## 2021-02-21 RX ORDER — ONDANSETRON 8 MG/1
8 TABLET, ORALLY DISINTEGRATING ORAL
Status: COMPLETED | OUTPATIENT
Start: 2021-02-21 | End: 2021-02-21

## 2021-02-21 RX ORDER — METOCLOPRAMIDE HYDROCHLORIDE 5 MG/ML
5 INJECTION INTRAMUSCULAR; INTRAVENOUS EVERY 6 HOURS
Status: DISCONTINUED | OUTPATIENT
Start: 2021-02-21 | End: 2021-02-23 | Stop reason: HOSPADM

## 2021-02-21 RX ORDER — ONDANSETRON 2 MG/ML
4 INJECTION INTRAMUSCULAR; INTRAVENOUS
Status: COMPLETED | OUTPATIENT
Start: 2021-02-21 | End: 2021-02-21

## 2021-02-21 RX ADMIN — METOCLOPRAMIDE 5 MG: 5 INJECTION, SOLUTION INTRAMUSCULAR; INTRAVENOUS at 13:45

## 2021-02-21 RX ADMIN — SODIUM CHLORIDE, SODIUM LACTATE, POTASSIUM CHLORIDE, AND CALCIUM CHLORIDE 150 ML/HR: 600; 310; 30; 20 INJECTION, SOLUTION INTRAVENOUS at 15:01

## 2021-02-21 RX ADMIN — ONDANSETRON 4 MG: 2 INJECTION INTRAMUSCULAR; INTRAVENOUS at 20:32

## 2021-02-21 RX ADMIN — METOCLOPRAMIDE 5 MG: 5 INJECTION, SOLUTION INTRAMUSCULAR; INTRAVENOUS at 21:50

## 2021-02-21 RX ADMIN — ONDANSETRON 8 MG: 8 TABLET, ORALLY DISINTEGRATING ORAL at 07:23

## 2021-02-21 RX ADMIN — DIPHENHYDRAMINE HYDROCHLORIDE 25 MG: 50 INJECTION, SOLUTION INTRAMUSCULAR; INTRAVENOUS at 01:05

## 2021-02-21 RX ADMIN — DEXTROSE MONOHYDRATE AND SODIUM CHLORIDE 100 ML/HR: 5; .45 INJECTION, SOLUTION INTRAVENOUS at 05:09

## 2021-02-21 RX ADMIN — ONDANSETRON 4 MG: 2 INJECTION INTRAMUSCULAR; INTRAVENOUS at 05:43

## 2021-02-21 RX ADMIN — ONDANSETRON 4 MG: 2 INJECTION INTRAMUSCULAR; INTRAVENOUS at 15:01

## 2021-02-21 RX ADMIN — METOCLOPRAMIDE HYDROCHLORIDE 5 MG: 5 INJECTION INTRAMUSCULAR; INTRAVENOUS at 05:04

## 2021-02-21 RX ADMIN — SODIUM CHLORIDE, SODIUM LACTATE, POTASSIUM CHLORIDE, AND CALCIUM CHLORIDE 1000 ML: 600; 310; 30; 20 INJECTION, SOLUTION INTRAVENOUS at 13:46

## 2021-02-21 RX ADMIN — METOCLOPRAMIDE 5 MG: 5 INJECTION, SOLUTION INTRAMUSCULAR; INTRAVENOUS at 01:22

## 2021-02-21 NOTE — PROGRESS NOTES
Reason for Admission: chart reviewed; per ED H&P, patient is \"Patient G4, P1 (1 miscarriage, 1 ) previously diagnosed with hyperemesis gravidarum presents with nausea and vomiting. Patient states that her last menstrual period was on . Denies any abdominal pain, denies any fever or chills. Denies any dysuria or vaginal bleeding. Was seen earlier today and given Phenergan prescription. She also takes Zofran at home. Patient states that she saw some brown specs in her vomit. No gross blood. She vomited more than 10 times today. Cannot keep anything down. \"       Prior to admission patient was living:with bofriend and two children    Prior to admission patient was using the following DME:  none                   RUR Score:     Low, 8%                Plan for utilizing home health:     None at this time     PCP: First and Last name:     Name of Practice:    Are you a current patient: Yes/No:    Approximate date of last visit:    Can you participate in a virtual visit with your PCP:                     Current Advanced Directive/Advance Care Plan: full code; no ACP on file    Healthcare Decision Maker: patient                           Transition of Care Plan:    Care manager rounded on patient who was clearly ill with multiple emesis bags; could not effectively interview patient due to frequent emesis; was awaiting OB visit for orders.

## 2021-02-21 NOTE — ED PROVIDER NOTES
HPI   Patient G4, P1 (1 miscarriage, 1 ) previously diagnosed with hyperemesis gravidarum presents with nausea and vomiting. Patient states that her last menstrual period was on . Denies any abdominal pain, denies any fever or chills. Denies any dysuria or vaginal bleeding. Was seen earlier today and given Phenergan prescription. She also takes Zofran at home. Patient states that she saw some brown specs in her vomit. No gross blood. She vomited more than 10 times today. Cannot keep anything down.      Past Medical History:   Diagnosis Date    Miscarriage        Past Surgical History:   Procedure Laterality Date    HX DILATION AND CURETTAGE           Family History:   Problem Relation Age of Onset    Colon Cancer Paternal Grandfather        Social History     Socioeconomic History    Marital status: SINGLE     Spouse name: Not on file    Number of children: Not on file    Years of education: Not on file    Highest education level: Not on file   Occupational History    Not on file   Social Needs    Financial resource strain: Not on file    Food insecurity     Worry: Not on file     Inability: Not on file    Transportation needs     Medical: Not on file     Non-medical: Not on file   Tobacco Use    Smoking status: Former Smoker    Smokeless tobacco: Never Used   Substance and Sexual Activity    Alcohol use: No    Drug use: No    Sexual activity: Yes     Partners: Male     Birth control/protection: Condom   Lifestyle    Physical activity     Days per week: Not on file     Minutes per session: Not on file    Stress: Not on file   Relationships    Social connections     Talks on phone: Not on file     Gets together: Not on file     Attends [employer] service: Not on file     Active member of club or organization: Not on file     Attends meetings of clubs or organizations: Not on file     Relationship status: Not on file    Intimate partner violence     Fear of current or ex partner: Not on file     Emotionally abused: Not on file     Physically abused: Not on file     Forced sexual activity: Not on file   Other Topics Concern    Not on file   Social History Narrative    Not on file         ALLERGIES: Patient has no known allergies. Review of Systems   Constitutional: Negative for activity change, appetite change and chills. HENT: Negative for congestion, ear discharge, ear pain and sore throat. Eyes: Negative for photophobia and pain. Respiratory: Negative for cough and choking. Cardiovascular: Negative for palpitations and leg swelling. Gastrointestinal: Positive for nausea. Negative for anal bleeding and rectal pain. Endocrine: Negative for polydipsia and polyuria. Genitourinary: Negative for genital sores and urgency. Musculoskeletal: Negative for arthralgias and myalgias. Neurological: Negative for dizziness, seizures and speech difficulty. Psychiatric/Behavioral: Negative for hallucinations, self-injury and suicidal ideas. Vitals:    02/20/21 2242 02/20/21 2245   BP: (!) 103/47    Pulse: 70    Resp: 22    Temp: 98.2 °F (36.8 °C)    SpO2: 100%    Weight:  63.5 kg (140 lb)   Height:  5' 6\" (1.676 m)            Physical Exam  HENT:      Head: Normocephalic and atraumatic. Eyes:      Pupils: Pupils are equal, round, and reactive to light. Neck:      Musculoskeletal: Normal range of motion and neck supple. Cardiovascular:      Rate and Rhythm: Normal rate. Heart sounds: No friction rub. No gallop. Pulmonary:      Effort: No respiratory distress. Abdominal:      General: Bowel sounds are normal.      Palpations: Abdomen is soft. Tenderness: There is no abdominal tenderness. Musculoskeletal: Normal range of motion. Skin:     General: Skin is warm and dry. Neurological:      Mental Status: She is alert and oriented to person, place, and time. Psychiatric:         Behavior: Behavior normal.         Thought Content:  Thought content normal.         Judgment: Judgment normal.          MDM   Patient presents for second time today with persistent nausea and vomiting  Patient has previously been diagnosed with hyperemesis gravidarum in previous pregnancies  She has not established OB/GYN care  Patient's abdomen soft nontender, no vaginal discharge or bleeding  Will repeat work-up hydrate patient and treat her with antiemetics  11:42 PM Patient feels better on reassessment   Labs as interpreted by me  Hemoglobin 12.8-no significant difference from hemoglobin in the morning  electrolytes within normal limits  Reviewed UA from earlier  Patient had bacteria in the urine  We will treat with antibiotics  Will also send urine culture  As patient was finishing her fluid bolus I was informed by nurse that patient still nauseous and vomiting  Patient received Benadryl and Reglan will be reassessed  Patient had multiple reassessments and  multiple rounds of antiemetics (phenergan, benadryl, reglan and zofran)  Bedside ultrasound performed by me IUP with visible gestational sac/yolk sac  Patient failed p.o. challenge, persistent vomiting  Placed on maintenance fluids  We contacted transfer center for transfer to place with OB services  Will be transferred to [facility], spoke to Dr. Preet Ann ob/gyn who accepted patient    Critical care:  CRITICAL CARE:    I have spent 78 minutes of critical care time involved in lab review, consultations with specialist, family decision-making, and documentation. This time does not include separately billable procedures. During this entire length of time I was immediately available to the patient. Critical Care: The reason for providing this level of medical care for this critically ill patient was due a critical illness that impaired one or more vital organ systems such that there was a high probability of imminent or life threatening deterioration in the patients condition.  This care involved high complexity decision making to assess, manipulate, and support vital system functions, to treat this degreee vital organ system failure and to prevent further life threatening deterioration of the patients condition.   Procedures

## 2021-02-21 NOTE — ED TRIAGE NOTES
Pt c/o nausea/vomiting. +pregnancy and seen for same complaint earlier today, with no relief from prescribed phenergan.

## 2021-02-21 NOTE — ROUTINE PROCESS
TRANSFER - IN REPORT:    Verbal report received from Direct Admit report from our nightshift RN AMERICO Kelly RN(name) on [place]  being received from [facility]. (unit) for routine progression of care      Report consisted of patients Situation, Background, Assessment and   Recommendations(SBAR). Information from the following report(s) SBAR, Kardex, Intake/Output and MAR was reviewed with the receiving nurse. Opportunity for questions and clarification was provided. Assessment completed upon patients arrival to unit and care assumed.

## 2021-02-21 NOTE — ROUTINE PROCESS
TRANSFER - OUT REPORT: 
 
Verbal report given to Ya Klely(name) on [place]  being transferred to [facility] (unit) for routine progression of care Report consisted of patients Situation, Background, Assessment and  
Recommendations(SBAR). Information from the following report(s) SBAR, ED Summary and MAR was reviewed with the receiving nurse. Lines:  
Peripheral IV 02/21/21 Left Antecubital (Active) Opportunity for questions and clarification was provided. Patient transported with: 
 [employer]

## 2021-02-21 NOTE — PROGRESS NOTES
Bedside and Verbal shift change report given to Demetrius Ross (oncoming nurse) by Alfredo Vigil (offgoing nurse). Report included the following information SBAR, Kardex, Intake/Output, and MAR. Patient being admitted from [facility]. 0810-Patient arrived on the unit. Patient A/OX4. Patient tin bed resting quietly no complaints at this time. Patient received Zofran 8 MG Po at 0723, prior to transport. Per transport medic no emesis on ride over. Dr. Alex Gruber is doing a  at [facility] then will come see the patient. [place]. 1015-Patient requested something for nausea. Dry heaving into emesis bag. Still awaiting orders. Provided Ginger ale and water. 1400-Orders received. Administered scheduled Reglan and started LR bolus. 1500-Patient requested something for nausea. Administered Zofran 4 MG IV. Started continuous fluids at this time. 1700-Patient Showered. 1750-Patient ambulated around the unit. Bedside and Verbal shift change report given to Alfredo Vigil (oncoming nurse) by Demetrius Ross (offgoing nurse). Report included the following information SBAR, Kardex, Intake/Output, and MAR.

## 2021-02-21 NOTE — ED NOTES
Pt medicated for transport. Pt in NAD and no active vomiting. Pt left department in stable condition with [place] [id number] transport personnel.

## 2021-02-21 NOTE — H&P
Obstetrical History and Physical    Subjective:     Date of Admission: 2/21/2021    Patient is a 32 y.o. G3 [id number]  female admitted foe failed outpatient management of hyperemesis gravidarum. Patient seen twice at [facility] ED and was unable to tolerate PO after multiple IV meds attempted. Pt does not know how far along she is and states her last deivery was at [facility].  Pt with no VB or cramping. Past Medical History:   Diagnosis Date    Miscarriage       Past Surgical History:   Procedure Laterality Date    HX DILATION AND CURETTAGE  2013      Prior to Admission medications    Medication Sig Start Date End Date Taking? Authorizing Provider   promethazine (PHENERGAN) 25 mg tablet Take 1 Tab by mouth every six (6) hours as needed for Nausea. Caution: This medication may make you drowsy. Avoid driving while under the influence of this medication. 2/20/21   Jeremy Roland MD   doxylamine succinate (Unisom, doxylamine,) 25 mg tablet Take 0.5 Tabs by mouth nightly as needed for Nausea for up to 10 days. Indications: nausea and vomiting of pregnancy 2/20/21 3/2/21  Rene Betancur MD   pyridoxine, vitamin B6, (VITAMIN B-6) 25 mg tablet Take 1 Tab by mouth every eight (8) hours for 10 days. Indications: hyperemesis gravidarum 2/20/21 3/2/21  Rene Betancur MD   cephALEXin (Keflex) 500 mg capsule Take 1 Cap by mouth four (4) times daily for 7 days. Indications: UTI pregnancy 2/20/21 2/27/21  Rene Betancur MD   albuterol (PROVENTIL HFA, VENTOLIN HFA, PROAIR HFA) 90 mcg/actuation inhaler Take 2 Puffs by inhalation every four (4) hours as needed for Wheezing.  11/1/20   Emi Nesbitt NP     No Known Allergies   Social History     Tobacco Use    Smoking status: Former Smoker    Smokeless tobacco: Never Used   Substance Use Topics    Alcohol use: No      Family History   Problem Relation Age of Onset    Colon Cancer Paternal Grandfather           Objective:     Blood pressure (!) 111/42, pulse 94, temperature 98.3 °F (36.8 °C), resp. rate 22, last menstrual period 2021, SpO2 100 %. Temp (24hrs), Av.4 °F (36.9 °C), Min:98.2 °F (36.8 °C), Max:98.7 °F (37.1 °C)        No intake/output data recorded. No intake/output data recorded. Data Review:   Recent Results (from the past 24 hour(s))   CBC WITH AUTOMATED DIFF    Collection Time: 21 10:59 PM   Result Value Ref Range    WBC 6.9 4.6 - 13.2 K/uL    RBC 4.30 4.20 - 5.30 M/uL    HGB 12.8 12.0 - 16.0 g/dL    HCT 37.6 35.0 - 45.0 %    MCV 87.4 74.0 - 97.0 FL    MCH 29.8 24.0 - 34.0 PG    MCHC 34.0 31.0 - 37.0 g/dL    RDW 12.9 11.6 - 14.5 %    PLATELET 694 738 - 291 K/uL    MPV 10.2 9.2 - 11.8 FL    NEUTROPHILS 89 (H) 40 - 73 %    LYMPHOCYTES 8 (L) 21 - 52 %    MONOCYTES 3 3 - 10 %    EOSINOPHILS 0 0 - 5 %    BASOPHILS 0 0 - 2 %    ABS. NEUTROPHILS 6.1 1.8 - 8.0 K/UL    ABS. LYMPHOCYTES 0.6 (L) 0.9 - 3.6 K/UL    ABS. MONOCYTES 0.2 0.05 - 1.2 K/UL    ABS. EOSINOPHILS 0.0 0.0 - 0.4 K/UL    ABS. BASOPHILS 0.0 0.0 - 0.1 K/UL    DF AUTOMATED     METABOLIC PANEL, COMPREHENSIVE    Collection Time: 21 10:59 PM   Result Value Ref Range    Sodium 139 136 - 145 mmol/L    Potassium 3.7 3.5 - 5.5 mmol/L    Chloride 106 100 - 111 mmol/L    CO2 21 21 - 32 mmol/L    Anion gap 12 3.0 - 18 mmol/L    Glucose 149 (H) 74 - 99 mg/dL    BUN 12 7.0 - 18 MG/DL    Creatinine 0.84 0.6 - 1.3 MG/DL    BUN/Creatinine ratio 14 12 - 20      GFR est AA >60 >60 ml/min/1.73m2    GFR est non-AA >60 >60 ml/min/1.73m2    Calcium 9.1 8.5 - 10.1 MG/DL    Bilirubin, total 0.5 0.2 - 1.0 MG/DL    ALT (SGPT) 15 13 - 56 U/L    AST (SGOT) 20 10 - 38 U/L    Alk.  phosphatase 48 45 - 117 U/L    Protein, total 8.0 6.4 - 8.2 g/dL    Albumin 4.0 3.4 - 5.0 g/dL    Globulin 4.0 2.0 - 4.0 g/dL    A-G Ratio 1.0 0.8 - 1.7         Assessment:     Active Problems:    Hyperemesis affecting pregnancy, antepartum (2021)        Plan:       Check labs:  TFT, HCG,CMP in AM      Disposition:     Type of admit:In-Patient    I saw and examined patient.     Signed By: Anabel Smith MD                         February 21, 2021

## 2021-02-22 ENCOUNTER — HOSPITAL ENCOUNTER (INPATIENT)
Dept: ULTRASOUND IMAGING | Age: 31
Discharge: HOME | DRG: 566 | End: 2021-02-22
Attending: OBSTETRICS & GYNECOLOGY
Payer: COMMERCIAL

## 2021-02-22 VITALS
HEART RATE: 90 BPM | TEMPERATURE: 99 F | WEIGHT: 140 LBS | DIASTOLIC BLOOD PRESSURE: 63 MMHG | BODY MASS INDEX: 22.5 KG/M2 | RESPIRATION RATE: 16 BRPM | SYSTOLIC BLOOD PRESSURE: 120 MMHG | HEIGHT: 66 IN | OXYGEN SATURATION: 100 %

## 2021-02-22 PROCEDURE — 93975 VASCULAR STUDY: CPT

## 2021-02-22 PROCEDURE — 74011250636 HC RX REV CODE- 250/636: Performed by: OBSTETRICS & GYNECOLOGY

## 2021-02-22 PROCEDURE — 65270000029 HC RM PRIVATE

## 2021-02-22 RX ORDER — FAMOTIDINE 20 MG/1
20 TABLET, FILM COATED ORAL DAILY
Status: DISCONTINUED | OUTPATIENT
Start: 2021-02-23 | End: 2021-02-23 | Stop reason: HOSPADM

## 2021-02-22 RX ORDER — DEXTROSE, SODIUM CHLORIDE, AND POTASSIUM CHLORIDE 5; .45; .15 G/100ML; G/100ML; G/100ML
150 INJECTION INTRAVENOUS CONTINUOUS
Status: DISCONTINUED | OUTPATIENT
Start: 2021-02-22 | End: 2021-02-23 | Stop reason: HOSPADM

## 2021-02-22 RX ADMIN — ONDANSETRON 4 MG: 2 INJECTION INTRAMUSCULAR; INTRAVENOUS at 09:34

## 2021-02-22 RX ADMIN — DEXTROSE MONOHYDRATE, SODIUM CHLORIDE, AND POTASSIUM CHLORIDE 150 ML/HR: 50; 4.5; 1.49 INJECTION, SOLUTION INTRAVENOUS at 17:40

## 2021-02-22 RX ADMIN — METOCLOPRAMIDE 5 MG: 5 INJECTION, SOLUTION INTRAMUSCULAR; INTRAVENOUS at 04:18

## 2021-02-22 RX ADMIN — SODIUM CHLORIDE, SODIUM LACTATE, POTASSIUM CHLORIDE, AND CALCIUM CHLORIDE 150 ML/HR: 600; 310; 30; 20 INJECTION, SOLUTION INTRAVENOUS at 08:58

## 2021-02-22 RX ADMIN — ONDANSETRON 4 MG: 2 INJECTION INTRAMUSCULAR; INTRAVENOUS at 01:04

## 2021-02-22 RX ADMIN — DEXTROSE MONOHYDRATE, SODIUM CHLORIDE, AND POTASSIUM CHLORIDE 150 ML/HR: 50; 4.5; 1.49 INJECTION, SOLUTION INTRAVENOUS at 13:26

## 2021-02-22 RX ADMIN — ONDANSETRON 4 MG: 2 INJECTION INTRAMUSCULAR; INTRAVENOUS at 17:40

## 2021-02-22 RX ADMIN — ONDANSETRON 4 MG: 2 INJECTION INTRAMUSCULAR; INTRAVENOUS at 13:27

## 2021-02-22 RX ADMIN — ONDANSETRON 4 MG: 2 INJECTION INTRAMUSCULAR; INTRAVENOUS at 22:37

## 2021-02-22 RX ADMIN — SODIUM CHLORIDE, SODIUM LACTATE, POTASSIUM CHLORIDE, AND CALCIUM CHLORIDE 150 ML/HR: 600; 310; 30; 20 INJECTION, SOLUTION INTRAVENOUS at 02:16

## 2021-02-22 RX ADMIN — ONDANSETRON 4 MG: 2 INJECTION INTRAMUSCULAR; INTRAVENOUS at 05:34

## 2021-02-22 NOTE — PROGRESS NOTES
S: MD contacted nursing staff about implementation of new orders/ MD notified about patient's complaint about IV Reglan causing more nausea/vomiting.  B: Patient informed nursing staff during am rounds that she did not want to take the scheduled IV Reglan 5 mg every 6 hours because she believed that it was worsening her nausea/vomiting occurrences. Patient refused 9 am dose. Patient was informed that MD would be notified for follow up and possible alternative order for nausea/vomiting management. A: At 1317 pm,  Gynecologist Dr. Karina Soto contacted nursing staff by phone and stated that he was discontinuing patient's IV Lactated Ringer continuous infusion at 150 cc/hr and was starting patient on IV Dextrose 5% and 0.45% Normal Saline with 20 MEQ KCL at 150 cc/hr. Additionally, Dr. Melissa Cherry stated that patient would be Famotidine 20 mg by mouth daily to manage patient's stomach acid. Dr. Melissa Cherry was notified about patient's notification to nursing staff that she felt her scheduled IV Reglan 5 mg every 6 hours was increasing her occurrences of nausea/vomiting and he was informed that patient refused AM dose around 9 am. Understanding was verbalized. Patient was administered IV Dextrose 5% and 0.45% Normal Saline with 20 MEQ KCL at 150 cc/hr around 1326 pm as well as IV Zofran 4 mg for nausea/vomiting management. Patient was informed about Dr. Karyna Cortes new IV fluid therapy orders, scheduled daily Famotidine 20 mg by mouth order as well as MD's notification about her concern in accordance to IV Reglan 5 mg causing more occurrences of nausea and vomiting. Patient verbalized understanding. R: Will continue with prescribed plan of care.    Steven Ji  2/22/2021   1:29 PM

## 2021-02-22 NOTE — PROGRESS NOTES
[facility]    A/P  IUP at 7 wks  HEG    Will continue antiemetics  Will add PPI for reflux  Will advance diet as tolerated    S:   Pt. States nausea somewhat improved with Mari Tomas makes her nausea worse, has been having reflux, no VB, no HA    O:  Visit Vitals  BP (!) 110/52 (BP 1 Location: Right upper arm, BP Patient Position: At rest)   Pulse 60   Temp 97.9 °F (36.6 °C)   Resp 16   Ht 5' 5.98\" (1.676 m)   Wt 84.3 kg (185 lb 12.8 oz)   SpO2 100%   BMI 30.00 kg/m²       Recent Results (from the past 24 hour(s))   BETA HCG, QT    Collection Time: 02/21/21  2:46 PM   Result Value Ref Range    Beta HCG, QT 58,372 (H) 0 - 10 MIU/ML   METABOLIC PANEL, COMPREHENSIVE    Collection Time: 02/21/21  2:46 PM   Result Value Ref Range    Sodium 139 136 - 145 mmol/L    Potassium 3.4 (L) 3.5 - 5.5 mmol/L    Chloride 106 100 - 111 mmol/L    CO2 25 21 - 32 mmol/L    Anion gap 8 3.0 - 18 mmol/L    Glucose 99 74 - 99 mg/dL    BUN 11 7.0 - 18 MG/DL    Creatinine 0.74 0.6 - 1.3 MG/DL    BUN/Creatinine ratio 15 12 - 20      GFR est AA >60 >60 ml/min/1.73m2    GFR est non-AA >60 >60 ml/min/1.73m2    Calcium 8.4 (L) 8.5 - 10.1 MG/DL    Bilirubin, total 0.6 0.2 - 1.0 MG/DL    ALT (SGPT) 15 13 - 56 U/L    AST (SGOT) 10 10 - 38 U/L    Alk.  phosphatase 40 (L) 45 - 117 U/L    Protein, total 6.7 6.4 - 8.2 g/dL    Albumin 3.5 3.4 - 5.0 g/dL    Globulin 3.2 2.0 - 4.0 g/dL    A-G Ratio 1.1 0.8 - 1.7     TSH 3RD GENERATION    Collection Time: 02/21/21  2:46 PM   Result Value Ref Range    TSH 0.20 (L) 0.36 - 3.74 uIU/mL   T4, FREE    Collection Time: 02/21/21  2:46 PM   Result Value Ref Range    T4, Free 1.2 0.7 - 1.5 NG/DL       Murray Peña MD  2/22/2021  1:13 PM

## 2021-02-22 NOTE — PROGRESS NOTES
Problem: Hyperemesis  Goal: *Absence of nausea/vomiting  2/22/2021 0342 by Enma Mcdowell  Outcome: Not Progressing Towards Goal  2/22/2021 0341 by Enma Mcdowell  Outcome: Progressing Towards Goal  2/22/2021 0341 by Enma Mcdowell  Outcome: Progressing Towards Goal     Problem: Patient Education: Go to Patient Education Activity  Goal: Patient/Family Education  2/22/2021 0342 by Enma Mcdowell  Outcome: Progressing Towards Goal  2/22/2021 0341 by Enma Mcdowell  Outcome: Not Progressing Towards Goal  2/22/2021 0341 by Enma Mcdowell  Outcome: Progressing Towards Goal     Problem: Falls - Risk of  Goal: *Absence of Falls  Description: Document Earma Diogenes Fall Risk and appropriate interventions in the flowsheet.   2/22/2021 0342 by Enma Mcdowell  Outcome: Progressing Towards Goal  Note: Fall Risk Interventions:  Mobility Interventions: Patient to call before getting OOB                          2/22/2021 0341 by Enma Mcdowell  Outcome: Progressing Towards Goal  Note: Fall Risk Interventions:  Mobility Interventions: Patient to call before getting OOB                          2/22/2021 0341 by Enma Mcdowell  Outcome: Progressing Towards Goal  Note: Fall Risk Interventions:  Mobility Interventions: Patient to call before getting OOB                             Problem: Patient Education: Go to Patient Education Activity  Goal: Patient/Family Education  2/22/2021 0341 by Enma Mcdowell  Outcome: Progressing Towards Goal  2/22/2021 0341 by Enma Mcdowell  Outcome: Progressing Towards Goal

## 2021-02-22 NOTE — ROUTINE PROCESS
Bedside and Verbal shift change report given to CHARLI Kelly RN (oncoming nurse) by MUNA Centeno RN (offgoing nurse). Report included the following information SBAR, Kardex, Intake/Output, MAR and Recent Results. Pt asleep in bed at this time. Introduced self to pt, updated whiteboard. No complaints at this time. Call bell within reach. 2032- Pt vomiting scant clear liquid and dry heaving, asked for prn nausea meds. Given Zofran prn per MD order. Given ice chips and ginger ale per pt request. Pt asking when MD is going to come to unit and see her, Dr. Sanjana Peck paged via office operating system ([phone]). 2059- Dr. Maurice Ruffin called unit, states pt is to be NPO (GI rest), states an MD will be up to see her in the am.     0104- Pt c/o vomiting and dry heaving, asking for prn nausea meds. Given Zofran per MD order. Pt aware of NPO status, given ice pack and cool wet washcloths for comfort. Pt states nursing staff can talk to pt's mother in the am to let her know about pt's condition. Bedside and Verbal shift change report given to SOLEDAD Ji RN (oncoming nurse) by CHARLI Kelly RN (offgoing nurse). Report included the following information SBAR, Kardex, Intake/Output, MAR and Recent Results.

## 2021-02-22 NOTE — PROGRESS NOTES
CM met with pt at bedside to discuss transition of care. Noted pt does not have a PCP. Pt would like assistance with obtaining a PCP. CMS has been notified to assist.  Anticipate pt will transition home with physician follow up when medically stable. No other transition of care needs have been identified. CM remains available.     Care Management Interventions  Mode of Transport at Discharge: Self  Transition of Care Consult (CM Consult): Discharge Planning  Current Support Network: Own Home  Confirm Follow Up Transport: Friends  The Plan for Transition of Care is Related to the Following Treatment Goals : home when medically stable  The Patient and/or Patient Representative was Provided with a Choice of Provider and Agrees with the Discharge Plan?: Yes  Name of the Patient Representative Who was Provided with a Choice of Provider and Agrees with the Discharge Plan: Deepali Gonzales, patient  Discharge Location  Discharge Placement: Home

## 2021-02-22 NOTE — PROGRESS NOTES
S: MD  Implemented diet/ Patient notified. B: At 476 1626 pm, Dr. Traci Hernandez contacted unit and informed nursing staff that he would implement Full liquid diet for patient. Patient was informed that if she presented with any occurrences of active vomiting with full liquid diet order to inform MD team. Understanding was verbalized. A: At 1327 pm, Patient was informed about full liquid diet order and was informed to notify nursing staff if she experienced any occurrences of active vomiting with meal periods so that MD team could be notified. Patient verbalized understanding and was presented full liquid diet menu to order. Additionally, patient was administered IV Zofran 4 mg for management of nausea/vomiting at 1327 pm.   R: Will continue with prescribed plan of care as directed.    Steven Ji  2/22/2021  2:35PM

## 2021-02-23 VITALS
OXYGEN SATURATION: 100 % | RESPIRATION RATE: 16 BRPM | HEART RATE: 67 BPM | WEIGHT: 185.4 LBS | BODY MASS INDEX: 29.8 KG/M2 | DIASTOLIC BLOOD PRESSURE: 61 MMHG | SYSTOLIC BLOOD PRESSURE: 106 MMHG | TEMPERATURE: 98.2 F | HEIGHT: 66 IN

## 2021-02-23 LAB
BACTERIA SPEC CULT: ABNORMAL
BACTERIA SPEC CULT: ABNORMAL
CC UR VC: ABNORMAL
SERVICE CMNT-IMP: ABNORMAL

## 2021-02-23 PROCEDURE — 74011250636 HC RX REV CODE- 250/636: Performed by: OBSTETRICS & GYNECOLOGY

## 2021-02-23 PROCEDURE — 74011250637 HC RX REV CODE- 250/637: Performed by: OBSTETRICS & GYNECOLOGY

## 2021-02-23 RX ORDER — ONDANSETRON 4 MG/1
4 TABLET, ORALLY DISINTEGRATING ORAL
Qty: 21 TAB | Refills: 1 | Status: SHIPPED | OUTPATIENT
Start: 2021-02-23

## 2021-02-23 RX ORDER — CEPHALEXIN 500 MG/1
500 CAPSULE ORAL 4 TIMES DAILY
Qty: 28 CAP | Refills: 0 | Status: SHIPPED | OUTPATIENT
Start: 2021-02-23 | End: 2021-03-02

## 2021-02-23 RX ADMIN — FAMOTIDINE 20 MG: 20 TABLET ORAL at 08:17

## 2021-02-23 RX ADMIN — ONDANSETRON 4 MG: 2 INJECTION INTRAMUSCULAR; INTRAVENOUS at 09:47

## 2021-02-23 RX ADMIN — DEXTROSE MONOHYDRATE, SODIUM CHLORIDE, AND POTASSIUM CHLORIDE 150 ML/HR: 50; 4.5; 1.49 INJECTION, SOLUTION INTRAVENOUS at 02:22

## 2021-02-23 RX ADMIN — DEXTROSE MONOHYDRATE, SODIUM CHLORIDE, AND POTASSIUM CHLORIDE 150 ML/HR: 50; 4.5; 1.49 INJECTION, SOLUTION INTRAVENOUS at 10:46

## 2021-02-23 RX ADMIN — ONDANSETRON 4 MG: 2 INJECTION INTRAMUSCULAR; INTRAVENOUS at 14:14

## 2021-02-23 RX ADMIN — ONDANSETRON 4 MG: 2 INJECTION INTRAMUSCULAR; INTRAVENOUS at 05:45

## 2021-02-23 RX ADMIN — ONDANSETRON 4 MG: 2 INJECTION INTRAMUSCULAR; INTRAVENOUS at 02:24

## 2021-02-23 NOTE — PROGRESS NOTES
Noted pt is to be discharged today with physician follow up. CMS has assisted with follow up with a PCP and OBGYN follow up. Pt's family will transport pt home today. No other transition of care needs have been identified.       Care Management Interventions  Mode of Transport at Discharge: Self  Transition of Care Consult (CM Consult): Discharge Planning  Current Support Network: Own Home  Confirm Follow Up Transport: Friends  The Plan for Transition of Care is Related to the Following Treatment Goals : home when medically stable  The Patient and/or Patient Representative was Provided with a Choice of Provider and Agrees with the Discharge Plan?: Yes  Name of the Patient Representative Who was Provided with a Choice of Provider and Agrees with the Discharge Plan: Abram Adler, patient  Discharge Location  Discharge Placement: Home

## 2021-02-23 NOTE — PROGRESS NOTES
1915 - Bedside and Verbal shift change report given to Howard Harrington RN (oncoming nurse) by SOLEDAD Lowe RN (offgoing nurse). Report included the following information SBAR, Kardex, Intake/Output, MAR and Recent Results. Shift summary -- Patient reported minimal nausea with increase in diet to full liquids. Continued to refuse scheduled IV Reglan. Voiding yellow, cloudy urine. UA and urine culture abnormal in charts. No IV antibiotics currently on MAR. Will relay assessment to oncoming shift to review with philipp HAAS.     6928 - Bedside and Verbal shift change report given to SOLEDAD Marley RN (oncoming nurse) by Howard Harrington RN (offgoing nurse). Report included the following information SBAR, Kardex, Intake/Output, MAR and Recent Results.

## 2021-02-23 NOTE — PROGRESS NOTES
0740- Bedside and Verbal shift change report given to Steve Davila RN (oncoming nurse) by GHAZAL Boone RN (offgoing nurse). Report included the following information SBAR, Kardex, Intake/Output and MAR. 1046- Spoke to Dr. Елена Parr regarding patient's UTI and possible needs for antibiotics for discharge, as well as the ultrasound results. He states he will order antibiotics for home and is already aware of the ovarian cyst found in ultrasound but is not concerned about it. 1417- I have reviewed discharge instructions with the patient. The patient verbalized understanding.

## 2021-02-23 NOTE — PROGRESS NOTES
Bedside and Verbal shift change report given to Patricia Blake RN (oncoming nurse) by Yoni SILVA, RN  (offgoing nurse). Report included the following information SBAR, Kardex and MAR. SHIFT UPDATES:  Patient presented with diagnosis hyperemesis associated with 6-8 weeks gestation. Patient had Abdominal Ultrasound this Am which confirmed presentation of viable embryo with 7 weeks gestation. Abdominal Ultrasound also presented with unremarkable Right Ovary (3.3 cm x 3.3 cm x 3.3 cm) & Left Ovary with an Hypoechoic structure 2.8 cm x 1.5 cm x 2 cm (4.8 cm x 2.8 cm x 2.9 cm). Cornelius Woody discontinued IV continuous fluid therapy of Lactated Ringer at 150 cc/hr and started on IV Dextrose 5% and 0.45 % Normal Saline with 20 MEQ KCL at 150 cc/hr due to an am potassium level of 3.4. Patient also had Full liquid diet implemented via Dr. Ashuotsh Woody. Dr. Ashutosh Woody stated that if patient had any issues with active vomiting associated with diet orders to notify MD team. Patient tolerated half of lunch (Broth and Icee) and consumed 75% tray for dinner (broth, popsicle & juice ) without vomiting. Patient was inquired about advancement of diet from full liquid diet to soft diet as directed but patient verbalized that she wanted to tolerate diet for additional part of shift and stated that she wanted to tray soft diet implementation on the am of 2/23/2021 for breakfast. Nightshift RN staff will be notified to inform dayshift staff on 2/23/2021 about monitoring patient's oral intake of full liquid diet order and to inform MD staff if patient had any active occurrences of vomiting overnight so that diet could be re-evaluated by MD team. Additionally, patient had scheduled daily oral Famotidine 20 mg tablet ordered for management of stomach acid. Additionally, Dr. Ashutosh Woody was informed about patient's concerns that her scheduled IV Reglan 5 mg every 6 hours was potentiating her nausea/vomiting.   At 1317 pm,  Ilya Cowart was notified but order still presented on file. Patient presented with orders with Zofran 4 mg IV every 4 hours PRN nausea/vomiting. Patient received last IV dose of Zofran 4 mg around 1740 pm.      ABNORMAL LAB:   Results for Gigi Toscano (MRN [id number]) as of 2/22/2021 14:35   Ref. Range 2/20/2021 22:59 2/21/2021 14:46   WBC Latest Ref Range: 4.6 - 13.2 K/uL 6.9    RBC Latest Ref Range: 4.20 - 5.30 M/uL 4.30    HGB Latest Ref Range: 12.0 - 16.0 g/dL 12.8    HCT Latest Ref Range: 35.0 - 45.0 % 37.6    MCV Latest Ref Range: 74.0 - 97.0 FL 87.4    MCH Latest Ref Range: 24.0 - 34.0 PG 29.8    MCHC Latest Ref Range: 31.0 - 37.0 g/dL 34.0    RDW Latest Ref Range: 11.6 - 14.5 % 12.9    PLATELET Latest Ref Range: 135 - 420 K/uL 254    MPV Latest Ref Range: 9.2 - 11.8 FL 10.2    NEUTROPHILS Latest Ref Range: 40 - 73 % 89 (H)    LYMPHOCYTES Latest Ref Range: 21 - 52 % 8 (L)    MONOCYTES Latest Ref Range: 3 - 10 % 3    EOSINOPHILS Latest Ref Range: 0 - 5 % 0    BASOPHILS Latest Ref Range: 0 - 2 % 0    DF Latest Units:   AUTOMATED    ABS. NEUTROPHILS Latest Ref Range: 1.8 - 8.0 K/UL 6.1    ABS. LYMPHOCYTES Latest Ref Range: 0.9 - 3.6 K/UL 0.6 (L)    ABS. MONOCYTES Latest Ref Range: 0.05 - 1.2 K/UL 0.2    ABS. EOSINOPHILS Latest Ref Range: 0.0 - 0.4 K/UL 0.0    ABS.  BASOPHILS Latest Ref Range: 0.0 - 0.1 K/UL 0.0    Sodium Latest Ref Range: 136 - 145 mmol/L 139 139   Potassium Latest Ref Range: 3.5 - 5.5 mmol/L 3.7 3.4 (L)   Chloride Latest Ref Range: 100 - 111 mmol/L 106 106   CO2 Latest Ref Range: 21 - 32 mmol/L 21 25   Anion gap Latest Ref Range: 3.0 - 18 mmol/L 12 8   Glucose Latest Ref Range: 74 - 99 mg/dL 149 (H) 99   BUN Latest Ref Range: 7.0 - 18 MG/DL 12 11   Creatinine Latest Ref Range: 0.6 - 1.3 MG/DL 0.84 0.74   BUN/Creatinine ratio Latest Ref Range: 12 - 20   14 15   Calcium Latest Ref Range: 8.5 - 10.1 MG/DL 9.1 8.4 (L)   GFR est non-AA Latest Ref Range: >60 ml/min/1.73m2 >60 >60   GFR est AA Latest Ref Range: >60 ml/min/1.73m2 >60 >60   Bilirubin, total Latest Ref Range: 0.2 - 1.0 MG/DL 0.5 0.6   Protein, total Latest Ref Range: 6.4 - 8.2 g/dL 8.0 6.7   Albumin Latest Ref Range: 3.4 - 5.0 g/dL 4.0 3.5   Globulin Latest Ref Range: 2.0 - 4.0 g/dL 4.0 3.2   A-G Ratio Latest Ref Range: 0.8 - 1.7   1.0 1.1   ALT Latest Ref Range: 13 - 56 U/L 15 15   AST Latest Ref Range: 10 - 38 U/L 20 10   Alk. phosphatase Latest Ref Range: 45 - 117 U/L 48 40 (L)   Beta HCG, QT Latest Ref Range: 0 - 10 MIU/ML  58,372 (H)   T4, Free Latest Ref Range: 0.7 - 1.5 NG/DL  1.2   TSH Latest Ref Range: 0.36 - 3.74 uIU/mL  0.20 (L)     Wounds? None. Central Lines? None. Last BM:  2/20/2021 (As stated by patient.)       Pending Labs for AM Draw: None. Discharge plan: Ccase management note on 2/22/2021 at 1155 am states that patient will discharge home with no discharge or transition needs.      Steven Ji  2/22/2021  7:01 PM

## 2021-02-23 NOTE — PROGRESS NOTES
Problem: Hyperemesis  Goal: *Absence of nausea/vomiting  Outcome: Resolved/Met     Problem: Patient Education: Go to Patient Education Activity  Goal: Patient/Family Education  Outcome: Resolved/Met     Problem: Falls - Risk of  Goal: *Absence of Falls  Description: Document Anjel Fall Risk and appropriate interventions in the flowsheet.   Outcome: Resolved/Met  Note: Fall Risk Interventions:  Mobility Interventions: Patient to call before getting OOB         Medication Interventions: Teach patient to arise slowly, Assess postural VS orthostatic hypotension                   Problem: Patient Education: Go to Patient Education Activity  Goal: Patient/Family Education  Outcome: Resolved/Met

## 2021-02-23 NOTE — PROGRESS NOTES
[facility] Progress Note      Assessment: IUP at 7+ weeks, HEG, now resolved. Pt is stable for discharge with close office followup. Plan: D/c home. Zofran, prenatal vitamins. F/u with OB in 1-2 weeks to establish care. Objective:    Visit Vitals  /85   Pulse 66   Temp 98.1 °F (36.7 °C)   Resp 16   Ht 5' 5.98\" (1.676 m)   Wt 84.1 kg (185 lb 6.4 oz)   SpO2 100%   BMI 29.94 kg/m²          Intake and Output:  Current Shift:  No intake/output data recorded. Last three shifts:  02/21 1901 - 02/23 0700  In: 1435 [P.O.:600; I.V.:835]  Out: 1200 [Urine:1200]     Lungs: CTA bilat  CV:  RRR without m,r,g  Abd:  Soft, NT, ND, NABS  Ext: no c/c/e, SCD's    All lab results for the last 24 hours reviewed. Subjective: Pt without complaints this morning. Nausea improved. Able to tolerate diet. Wishes to go home.      Hasmukh Gerber MD, MD  2/23/2021 9:40 AM

## 2021-02-23 NOTE — DISCHARGE SUMMARY
Discharge Summary     Name: Constance Hopson MRN: [[id number]]  SSN: [id number]    YOB: 1990  Age: 32 y.o. Sex: female      Allergies: Patient has no known allergies. Admit Date: 2/21/2021    Discharge Date: 2/23/2021      Admitting Physician: Robina Jones MD     * Admission Diagnoses: hyperemesis gravidarum    * Discharge Diagnoses:   Hospital Problems as of 2/23/2021 Date Reviewed: 9/10/2020          Codes Class Noted - Resolved POA    Hyperemesis affecting pregnancy, antepartum ICD-10-CM: O21.0  ICD-9-CM: 643.03  2/21/2021 - Present Unknown               * Procedures: IV fluids, antiemetics    * Discharge Condition: [facility] Course: Pt was admitted for IVF hydration and antiemetic therapy. By HD#2 she was able to tolerate PO and was deemed stable for discharge with close office followup. Significant Diagnostic Studies: No results found for this or any previous visit (from the past 24 hour(s)). * Disposition: Home    Discharge Medications:   Current Discharge Medication List      START taking these medications    Details   ondansetron (Zofran ODT) 4 mg disintegrating tablet Take 1 Tab by mouth every eight (8) hours as needed for Nausea or Vomiting. Qty: 21 Tab, Refills: 1         CONTINUE these medications which have NOT CHANGED    Details   doxylamine succinate (Unisom, doxylamine,) 25 mg tablet Take 0.5 Tabs by mouth nightly as needed for Nausea for up to 10 days. Indications: nausea and vomiting of pregnancy  Qty: 5 Tab, Refills: 0      pyridoxine, vitamin B6, (VITAMIN B-6) 25 mg tablet Take 1 Tab by mouth every eight (8) hours for 10 days. Indications: hyperemesis gravidarum  Qty: 30 Tab, Refills: 0      cephALEXin (Keflex) 500 mg capsule Take 1 Cap by mouth four (4) times daily for 7 days.  Indications: UTI pregnancy  Qty: 28 Cap, Refills: 0      albuterol (PROVENTIL HFA, VENTOLIN HFA, PROAIR HFA) 90 mcg/actuation inhaler Take 2 Puffs by inhalation every four (4) hours as needed for Wheezing. Qty: 1 Inhaler, Refills: 0         STOP taking these medications       promethazine (PHENERGAN) 25 mg tablet Comments:   Reason for Stopping:                * Follow-up Care/Patient Instructions: Activity: No restrictions.   Diet: Resume pre-hospital diet  Wound Care: None needed    Follow-up Information     Follow up With Specialties Details Why Contact Info    Janki Goncalves MD Obstetrics & Gynecology, Gynecology, Obstetrics   [address] [place] [address] [phone]      None    None ([phone]) Patient stated that they have no PCP

## 2021-02-26 ENCOUNTER — HOSPITAL ENCOUNTER (EMERGENCY)
Age: 31
Discharge: HOME | End: 2021-02-27
Attending: EMERGENCY MEDICINE
Payer: COMMERCIAL

## 2021-02-26 VITALS
OXYGEN SATURATION: 99 % | DIASTOLIC BLOOD PRESSURE: 61 MMHG | HEART RATE: 81 BPM | RESPIRATION RATE: 16 BRPM | SYSTOLIC BLOOD PRESSURE: 104 MMHG | TEMPERATURE: 98.3 F

## 2021-02-26 DIAGNOSIS — N39.0 URINARY TRACT INFECTION WITHOUT HEMATURIA, SITE UNSPECIFIED: ICD-10-CM

## 2021-02-26 DIAGNOSIS — A59.9 TRICHOMONAS INFECTION: ICD-10-CM

## 2021-02-26 DIAGNOSIS — O21.0 HYPEREMESIS GRAVIDARUM: Primary | ICD-10-CM

## 2021-02-26 LAB
ALBUMIN SERPL-MCNC: 4.3 G/DL (ref 3.4–5)
ALBUMIN/GLOB SERPL: 1 {RATIO} (ref 0.8–1.7)
ALP SERPL-CCNC: 53 U/L (ref 45–117)
ALT SERPL-CCNC: 19 U/L (ref 13–56)
ANION GAP SERPL CALC-SCNC: 8 MMOL/L (ref 3–18)
APPEARANCE UR: CLEAR
AST SERPL-CCNC: 6 U/L (ref 10–38)
BACTERIA URNS QL MICRO: ABNORMAL /HPF
BASOPHILS # BLD: 0 K/UL (ref 0–0.1)
BASOPHILS NFR BLD: 0 % (ref 0–2)
BILIRUB SERPL-MCNC: 1.1 MG/DL (ref 0.2–1)
BILIRUB UR QL: NEGATIVE
BUN SERPL-MCNC: 7 MG/DL (ref 7–18)
BUN/CREAT SERPL: 9 (ref 12–20)
CALCIUM SERPL-MCNC: 9.5 MG/DL (ref 8.5–10.1)
CHLORIDE SERPL-SCNC: 101 MMOL/L (ref 100–111)
CO2 SERPL-SCNC: 27 MMOL/L (ref 21–32)
COLOR UR: ABNORMAL
CREAT SERPL-MCNC: 0.76 MG/DL (ref 0.6–1.3)
DIFFERENTIAL METHOD BLD: ABNORMAL
EOSINOPHIL # BLD: 0 K/UL (ref 0–0.4)
EOSINOPHIL NFR BLD: 0 % (ref 0–5)
EPITH CASTS URNS QL MICRO: ABNORMAL /LPF (ref 0–5)
ERYTHROCYTE [DISTWIDTH] IN BLOOD BY AUTOMATED COUNT: 13 % (ref 11.6–14.5)
GLOBULIN SER CALC-MCNC: 4.3 G/DL (ref 2–4)
GLUCOSE SERPL-MCNC: 103 MG/DL (ref 74–99)
GLUCOSE UR STRIP.AUTO-MCNC: NEGATIVE MG/DL
HCT VFR BLD AUTO: 42.1 % (ref 35–45)
HGB BLD-MCNC: 15.1 G/DL (ref 12–16)
HGB UR QL STRIP: NEGATIVE
KETONES UR QL STRIP.AUTO: >160 MG/DL
LEUKOCYTE ESTERASE UR QL STRIP.AUTO: ABNORMAL
LIPASE SERPL-CCNC: 74 U/L (ref 73–393)
LYMPHOCYTES # BLD: 0.8 K/UL (ref 0.9–3.6)
LYMPHOCYTES NFR BLD: 13 % (ref 21–52)
MAGNESIUM SERPL-MCNC: 2.4 MG/DL (ref 1.6–2.6)
MCH RBC QN AUTO: 30.2 PG (ref 24–34)
MCHC RBC AUTO-ENTMCNC: 35.9 G/DL (ref 31–37)
MCV RBC AUTO: 84.2 FL (ref 74–97)
MONOCYTES # BLD: 0.5 K/UL (ref 0.05–1.2)
MONOCYTES NFR BLD: 8 % (ref 3–10)
NEUTS SEG # BLD: 4.6 K/UL (ref 1.8–8)
NEUTS SEG NFR BLD: 79 % (ref 40–73)
NITRITE UR QL STRIP.AUTO: NEGATIVE
PH UR STRIP: 6.5 [PH] (ref 5–8)
PLATELET # BLD AUTO: 215 K/UL (ref 135–420)
PMV BLD AUTO: 11.2 FL (ref 9.2–11.8)
POTASSIUM SERPL-SCNC: 3.3 MMOL/L (ref 3.5–5.5)
PROT SERPL-MCNC: 8.6 G/DL (ref 6.4–8.2)
PROT UR STRIP-MCNC: 30 MG/DL
RBC # BLD AUTO: 5 M/UL (ref 4.2–5.3)
RBC #/AREA URNS HPF: NEGATIVE /HPF (ref 0–5)
SODIUM SERPL-SCNC: 136 MMOL/L (ref 136–145)
SP GR UR REFRACTOMETRY: 1.03 (ref 1–1.03)
TRICHOMONAS UR QL MICRO: ABNORMAL
UROBILINOGEN UR QL STRIP.AUTO: 1 EU/DL (ref 0.2–1)
WBC # BLD AUTO: 5.9 K/UL (ref 4.6–13.2)
WBC URNS QL MICRO: ABNORMAL /HPF (ref 0–4)

## 2021-02-26 PROCEDURE — 80053 COMPREHEN METABOLIC PANEL: CPT

## 2021-02-26 PROCEDURE — 96374 THER/PROPH/DIAG INJ IV PUSH: CPT

## 2021-02-26 PROCEDURE — 74011250637 HC RX REV CODE- 250/637: Performed by: PHYSICIAN ASSISTANT

## 2021-02-26 PROCEDURE — 74011250636 HC RX REV CODE- 250/636: Performed by: PHYSICIAN ASSISTANT

## 2021-02-26 PROCEDURE — 83735 ASSAY OF MAGNESIUM: CPT

## 2021-02-26 PROCEDURE — 96361 HYDRATE IV INFUSION ADD-ON: CPT

## 2021-02-26 PROCEDURE — 81001 URINALYSIS AUTO W/SCOPE: CPT

## 2021-02-26 PROCEDURE — 85025 COMPLETE CBC W/AUTO DIFF WBC: CPT

## 2021-02-26 PROCEDURE — 99283 EMERGENCY DEPT VISIT LOW MDM: CPT

## 2021-02-26 PROCEDURE — 83690 ASSAY OF LIPASE: CPT

## 2021-02-26 RX ORDER — POTASSIUM CHLORIDE 20 MEQ/1
40 TABLET, EXTENDED RELEASE ORAL
Status: COMPLETED | OUTPATIENT
Start: 2021-02-26 | End: 2021-02-26

## 2021-02-26 RX ORDER — ONDANSETRON 2 MG/ML
4 INJECTION INTRAMUSCULAR; INTRAVENOUS
Status: COMPLETED | OUTPATIENT
Start: 2021-02-26 | End: 2021-02-26

## 2021-02-26 RX ORDER — CEPHALEXIN 250 MG/1
500 CAPSULE ORAL
Status: COMPLETED | OUTPATIENT
Start: 2021-02-26 | End: 2021-02-26

## 2021-02-26 RX ADMIN — CEPHALEXIN 500 MG: 250 CAPSULE ORAL at 20:01

## 2021-02-26 RX ADMIN — POTASSIUM CHLORIDE 40 MEQ: 1500 TABLET, EXTENDED RELEASE ORAL at 20:01

## 2021-02-26 RX ADMIN — ONDANSETRON 4 MG: 2 INJECTION INTRAMUSCULAR; INTRAVENOUS at 17:24

## 2021-02-26 RX ADMIN — SODIUM CHLORIDE 1000 ML: 900 INJECTION, SOLUTION INTRAVENOUS at 17:11

## 2021-02-26 NOTE — ED TRIAGE NOTES
Pt is 8wks pregnant. Has had vomiting for the last several days. Was seen at [facility] and tx for a uti.  Says she's unable to take the medicine for it

## 2021-02-27 ENCOUNTER — HOSPITAL ENCOUNTER (EMERGENCY)
Age: 31
Discharge: HOME | End: 2021-02-28
Attending: EMERGENCY MEDICINE
Payer: COMMERCIAL

## 2021-02-27 DIAGNOSIS — N39.0 URINARY TRACT INFECTION WITHOUT HEMATURIA, SITE UNSPECIFIED: ICD-10-CM

## 2021-02-27 DIAGNOSIS — O21.0 HYPEREMESIS GRAVIDARUM: Primary | ICD-10-CM

## 2021-02-27 LAB
ALBUMIN SERPL-MCNC: 4 G/DL (ref 3.4–5)
ALBUMIN/GLOB SERPL: 1 {RATIO} (ref 0.8–1.7)
ALP SERPL-CCNC: 46 U/L (ref 45–117)
ALT SERPL-CCNC: 17 U/L (ref 13–56)
ANION GAP SERPL CALC-SCNC: 12 MMOL/L (ref 3–18)
AST SERPL-CCNC: 24 U/L (ref 10–38)
BASOPHILS # BLD: 0 K/UL (ref 0–0.1)
BASOPHILS NFR BLD: 0 % (ref 0–2)
BILIRUB SERPL-MCNC: 1.2 MG/DL (ref 0.2–1)
BUN SERPL-MCNC: 7 MG/DL (ref 7–18)
BUN/CREAT SERPL: 10 (ref 12–20)
CALCIUM SERPL-MCNC: 9.1 MG/DL (ref 8.5–10.1)
CHLORIDE SERPL-SCNC: 103 MMOL/L (ref 100–111)
CO2 SERPL-SCNC: 21 MMOL/L (ref 21–32)
CREAT SERPL-MCNC: 0.7 MG/DL (ref 0.6–1.3)
DIFFERENTIAL METHOD BLD: ABNORMAL
EOSINOPHIL # BLD: 0 K/UL (ref 0–0.4)
EOSINOPHIL NFR BLD: 0 % (ref 0–5)
ERYTHROCYTE [DISTWIDTH] IN BLOOD BY AUTOMATED COUNT: 12.7 % (ref 11.6–14.5)
GLOBULIN SER CALC-MCNC: 4.1 G/DL (ref 2–4)
GLUCOSE SERPL-MCNC: 92 MG/DL (ref 74–99)
HCT VFR BLD AUTO: 36.4 % (ref 35–45)
HGB BLD-MCNC: 13.3 G/DL (ref 12–16)
LIPASE SERPL-CCNC: 70 U/L (ref 73–393)
LYMPHOCYTES # BLD: 0.9 K/UL (ref 0.9–3.6)
LYMPHOCYTES NFR BLD: 14 % (ref 21–52)
MCH RBC QN AUTO: 30.2 PG (ref 24–34)
MCHC RBC AUTO-ENTMCNC: 36.5 G/DL (ref 31–37)
MCV RBC AUTO: 82.7 FL (ref 74–97)
MONOCYTES # BLD: 0.4 K/UL (ref 0.05–1.2)
MONOCYTES NFR BLD: 7 % (ref 3–10)
NEUTS SEG # BLD: 5 K/UL (ref 1.8–8)
NEUTS SEG NFR BLD: 79 % (ref 40–73)
PLATELET # BLD AUTO: 251 K/UL (ref 135–420)
PMV BLD AUTO: 10.2 FL (ref 9.2–11.8)
POTASSIUM SERPL-SCNC: 3.8 MMOL/L (ref 3.5–5.5)
PROT SERPL-MCNC: 8.1 G/DL (ref 6.4–8.2)
RBC # BLD AUTO: 4.4 M/UL (ref 4.2–5.3)
SODIUM SERPL-SCNC: 136 MMOL/L (ref 136–145)
WBC # BLD AUTO: 6.3 K/UL (ref 4.6–13.2)

## 2021-02-27 PROCEDURE — 83690 ASSAY OF LIPASE: CPT

## 2021-02-27 PROCEDURE — 99284 EMERGENCY DEPT VISIT MOD MDM: CPT

## 2021-02-27 PROCEDURE — 96365 THER/PROPH/DIAG IV INF INIT: CPT

## 2021-02-27 PROCEDURE — 96361 HYDRATE IV INFUSION ADD-ON: CPT

## 2021-02-27 PROCEDURE — 74011250636 HC RX REV CODE- 250/636: Performed by: PHYSICIAN ASSISTANT

## 2021-02-27 PROCEDURE — 96375 TX/PRO/DX INJ NEW DRUG ADDON: CPT

## 2021-02-27 PROCEDURE — 96366 THER/PROPH/DIAG IV INF ADDON: CPT

## 2021-02-27 PROCEDURE — 85025 COMPLETE CBC W/AUTO DIFF WBC: CPT

## 2021-02-27 PROCEDURE — 80053 COMPREHEN METABOLIC PANEL: CPT

## 2021-02-27 RX ORDER — ONDANSETRON 2 MG/ML
4 INJECTION INTRAMUSCULAR; INTRAVENOUS
Status: COMPLETED | OUTPATIENT
Start: 2021-02-27 | End: 2021-02-27

## 2021-02-27 RX ADMIN — ONDANSETRON 4 MG: 2 INJECTION INTRAMUSCULAR; INTRAVENOUS at 21:55

## 2021-02-27 RX ADMIN — SODIUM CHLORIDE 1000 ML: 900 INJECTION, SOLUTION INTRAVENOUS at 21:54

## 2021-02-27 NOTE — ED PROVIDER NOTES
[employer]  [facility] EMERGENCY DEPT    Date: 2021  Patient Name: Jaun Gross    History of Presenting Illness     Chief Complaint   Patient presents with    Vomiting    Dehydration     32 y.o. female G4A2P1 approximately 8 weeks gestation presents the ED complaining of nausea and vomiting. Patient has a history of hyperemesis with prior pregnancies and says this feels the same. She has been taking Zofran at home with minimal improvement. States she does have Diclegis at home, but has not attempted taking it yet. Patient has had a confirmed IUP. Patient reports being on Keflex for a urinary tract infection, states that she has unable to keep this down currently. Pt states she has an  scheduled for 3/2. Denies any abdominal pain, vaginal bleeding, fever, chills, other symptoms. Patient denies any other associated signs or symptoms. Patient denies any other complaints. Nursing notes regarding the HPI and triage nursing notes were reviewed. Prior medical records were reviewed. Current Facility-Administered Medications   Medication Dose Route Frequency Provider Last Rate Last Admin    potassium chloride (K-DUR, KLOR-CON) SR tablet 40 mEq  40 mEq Oral NOW Frutoso Jaimie Mariscal PA        cephALEXin (KEFLEX) capsule 500 mg  500 mg Oral NOW Jaimie Pablo PA        sodium chloride 0.9 % bolus infusion 1,000 mL  1,000 mL IntraVENous ONCE Jaimie Pablo PA         Current Outpatient Medications   Medication Sig Dispense Refill    ondansetron (Zofran ODT) 4 mg disintegrating tablet Take 1 Tab by mouth every eight (8) hours as needed for Nausea or Vomiting. 21 Tab 1    cephALEXin (Keflex) 500 mg capsule Take 1 Cap by mouth four (4) times daily for 7 days. Indications: UTI pregnancy 28 Cap 0    doxylamine succinate (Unisom, doxylamine,) 25 mg tablet Take 0.5 Tabs by mouth nightly as needed for Nausea for up to 10 days.  Indications: nausea and vomiting of pregnancy 5 Tab 0    pyridoxine, vitamin B6, (VITAMIN B-6) 25 mg tablet Take 1 Tab by mouth every eight (8) hours for 10 days. Indications: hyperemesis gravidarum 30 Tab 0    albuterol (PROVENTIL HFA, VENTOLIN HFA, PROAIR HFA) 90 mcg/actuation inhaler Take 2 Puffs by inhalation every four (4) hours as needed for Wheezing. 1 Inhaler 0       Past History     Past Medical History:  Past Medical History:   Diagnosis Date    Miscarriage        Past Surgical History:  Past Surgical History:   Procedure Laterality Date    HX DILATION AND CURETTAGE  2013       Family History:  Family History   Problem Relation Age of Onset    Colon Cancer Paternal Grandfather        Social History:  Social History     Tobacco Use    Smoking status: Former Smoker    Smokeless tobacco: Never Used   Substance Use Topics    Alcohol use: No    Drug use: No       Allergies:  No Known Allergies    Patient's primary care provider (as noted in EPIC):  Andres Alex NP    Review of Systems   Constitutional:  Denies malaise, fever, chills. Respiratory:  Denies cough, wheezing, difficulty breathing, shortness of breath. GI/ABD: + nausea, vomiting. Denies injury, pain, distention, diarrhea. :  Denies injury, pain, dysuria or urgency. Back:  Denies injury or pain. Pelvis:  Denies injury or pain. Neuro:  Denies headache, LOC, dizziness, neurologic symptoms/deficits/paresthesias. Skin: Denies injury, rash, itching or skin changes. All other systems negative as reviewed. Visit Vitals  /61 (BP 1 Location: Left upper arm, BP Patient Position: At rest)   Pulse 81   Temp 98.3 °F (36.8 °C)   Resp 16   SpO2 99%       PHYSICAL EXAM:  CONSTITUTIONAL:  Alert, appears uncomfortable, emesis bag in hand;  well developed;  well nourished. HEAD:  Normocephalic, atraumatic. EYES:  EOMI. Non-icteric sclera. Normal conjunctiva. NECK:  Supple  RESPIRATORY:  Chest clear, equal breath sounds, good air movement.   CARDIOVASCULAR:  Regular rate and rhythm. No murmurs, rubs, or gallops. GI:  Normal bowel sounds, abdomen soft and non-tender. No rebound or guarding. BACK:  Non-tender. NEURO:  Moves all four extremities, and grossly normal motor exam.  SKIN:  No rashes;  Normal for age. PSYCH:  Alert and normal affect. DIFFERENTIAL DIAGNOSES/ MEDICAL DECISION MAKING:  In this patient, given no abdominal pain in pregnant patient with nausea and vomiting, hyperemesis gravidum is high on the differential.    Based on the patient's history of presenting illness, physical examination, laboratory, radiographic, and/or tests results, and response to medical interventions, I believe the patient most likely is having hyperemesis gravidum. Nausea and vomiting resolved with noted antiemetic medication(s). Given IV zofran here with improvement. Pt states she was unable to keep it down at home but threw up right after putting it in her mouth. She was able to take potassium and Keflex here, also given diet ginger ale as well as cranberry juice, which she kept down. Patient was instructed to take the Diclegis that she was previously prescribed, follow-up with OB/GYN, return for any acute worsening. She does not have any abdominal pain or vaginal bleeding. Patient also has trichomonas, Rx provided for Flagyl. Diagnosis:   1. Hyperemesis gravidarum    2. Trichomonas infection    3.  Urinary tract infection without hematuria, site unspecified      Disposition: Discharge    Follow-up Information     Follow up With Specialties Details Why Contact [facility], [address] [facility] Gynecology In 3 days  [address] [facility], [address] [place] [address]    [facility] EMERGENCY DEPT Emergency Medicine  If symptoms worsen [address] [address]  [phone]          Patient's Medications   Start Taking    No medications on file   Continue Taking    ALBUTEROL (PROVENTIL HFA, VENTOLIN HFA, PROAIR HFA) 90 MCG/ACTUATION INHALER    Take 2 Puffs by inhalation every four (4) hours as needed for Wheezing. CEPHALEXIN (KEFLEX) 500 MG CAPSULE    Take 1 Cap by mouth four (4) times daily for 7 days. Indications: UTI pregnancy    DOXYLAMINE SUCCINATE (UNISOM, DOXYLAMINE,) 25 MG TABLET    Take 0.5 Tabs by mouth nightly as needed for Nausea for up to 10 days. Indications: nausea and vomiting of pregnancy    ONDANSETRON (ZOFRAN ODT) 4 MG DISINTEGRATING TABLET    Take 1 Tab by mouth every eight (8) hours as needed for Nausea or Vomiting. PYRIDOXINE, VITAMIN B6, (VITAMIN B-6) 25 MG TABLET    Take 1 Tab by mouth every eight (8) hours for 10 days.  Indications: hyperemesis gravidarum   These Medications have changed    No medications on file   Stop Taking    No medications on file     LUÍS Cary

## 2021-02-27 NOTE — ED NOTES
Received from day tour patient with hyperemesis she states that she normally gets like this with all her pregnancies. She is able to tolerate ice chips on and off. Patient wants to go home and will follow up with her OB. Encouraged to continue oral intake and to consider sports drinks or fluids as tolerated.

## 2021-02-28 VITALS
OXYGEN SATURATION: 100 % | HEART RATE: 86 BPM | DIASTOLIC BLOOD PRESSURE: 48 MMHG | TEMPERATURE: 98.4 F | SYSTOLIC BLOOD PRESSURE: 102 MMHG | WEIGHT: 150 LBS | BODY MASS INDEX: 24.11 KG/M2 | RESPIRATION RATE: 20 BRPM | HEIGHT: 66 IN

## 2021-02-28 LAB
APPEARANCE UR: ABNORMAL
BACTERIA URNS QL MICRO: ABNORMAL /HPF
BILIRUB UR QL: NEGATIVE
COLOR UR: YELLOW
EPITH CASTS URNS QL MICRO: ABNORMAL /LPF (ref 0–5)
GLUCOSE UR STRIP.AUTO-MCNC: NEGATIVE MG/DL
HGB UR QL STRIP: NEGATIVE
KETONES UR QL STRIP.AUTO: >160 MG/DL
LEUKOCYTE ESTERASE UR QL STRIP.AUTO: ABNORMAL
NITRITE UR QL STRIP.AUTO: NEGATIVE
PH UR STRIP: 6 [PH] (ref 5–8)
PROT UR STRIP-MCNC: 30 MG/DL
RBC #/AREA URNS HPF: ABNORMAL /HPF (ref 0–5)
SP GR UR REFRACTOMETRY: 1.03 (ref 1–1.03)
TRICHOMONAS UR QL MICRO: ABNORMAL
UROBILINOGEN UR QL STRIP.AUTO: 1 EU/DL (ref 0.2–1)
WBC URNS QL MICRO: ABNORMAL /HPF (ref 0–4)

## 2021-02-28 PROCEDURE — 81001 URINALYSIS AUTO W/SCOPE: CPT

## 2021-02-28 PROCEDURE — 74011250636 HC RX REV CODE- 250/636: Performed by: PHYSICIAN ASSISTANT

## 2021-02-28 PROCEDURE — 96365 THER/PROPH/DIAG IV INF INIT: CPT

## 2021-02-28 PROCEDURE — 96361 HYDRATE IV INFUSION ADD-ON: CPT

## 2021-02-28 PROCEDURE — 96366 THER/PROPH/DIAG IV INF ADDON: CPT

## 2021-02-28 PROCEDURE — 96376 TX/PRO/DX INJ SAME DRUG ADON: CPT

## 2021-02-28 PROCEDURE — 74011000258 HC RX REV CODE- 258: Performed by: PHYSICIAN ASSISTANT

## 2021-02-28 RX ORDER — CEPHALEXIN 250 MG/1
500 CAPSULE ORAL
Status: DISCONTINUED | OUTPATIENT
Start: 2021-02-28 | End: 2021-02-28

## 2021-02-28 RX ORDER — PROMETHAZINE HYDROCHLORIDE 25 MG/1
25 SUPPOSITORY RECTAL
Qty: 20 SUPPOSITORY | Refills: 0 | Status: SHIPPED | OUTPATIENT
Start: 2021-02-28 | End: 2021-03-07

## 2021-02-28 RX ORDER — ONDANSETRON 2 MG/ML
4 INJECTION INTRAMUSCULAR; INTRAVENOUS
Status: COMPLETED | OUTPATIENT
Start: 2021-02-28 | End: 2021-02-28

## 2021-02-28 RX ORDER — METRONIDAZOLE 500 MG/1
500 TABLET ORAL 2 TIMES DAILY
Qty: 14 TAB | Refills: 0 | Status: SHIPPED | OUTPATIENT
Start: 2021-02-28 | End: 2021-03-07

## 2021-02-28 RX ADMIN — SODIUM CHLORIDE 25 MG: 900 INJECTION, SOLUTION INTRAVENOUS at 02:48

## 2021-02-28 RX ADMIN — SODIUM CHLORIDE 1000 ML: 900 INJECTION, SOLUTION INTRAVENOUS at 01:46

## 2021-02-28 RX ADMIN — ONDANSETRON 4 MG: 2 INJECTION INTRAMUSCULAR; INTRAVENOUS at 00:27

## 2021-02-28 NOTE — ED PROVIDER NOTES
[employer]  [facility] EMERGENCY DEPT    Attending attestation (take over): I saw and evaluated the patient with the physician's assistant and agree with the note below without exception. The patient remained persistently nauseated for some time, but was ultimately relieved with fluids and Phenergan. After my for reassessment, the patient is no longer nauseated. The plan will be to discharge the patient home with continued Diclegis, ondansetron second line with promethazine third line. The patient is amenable and happy with this plan. The patient has good OB follow-up. Patient will be discharged home. Gary Tse MD                  Date: 2021 9:24 PM   Patient Name: Karley Paul    History of Presenting Illness     Chief Complaint   Patient presents with    Vomiting     32 y.o. female G4A2P1 ~8 weeks gestation, Bill Dooley  presents the ED c/o N/V, she was seen for the same yesterday in this ED and was also tx for UTI currently tx with Keflex and trich tx with Flagyl yesterday. Patient has a history of hyperemesis with prior pregnancies and says this feels the same. Pt is taking Zofran and Diclegis at home with some relief when she manages to keep it down. Patient has had a confirmed IUP by US 2021. Patient reports being on Keflex for a urinary tract infection, states that she is struggling to keep it down. Pt states she has an  scheduled for 3/3. Notes, 1st preg ended in miscarriage and D&C, 3rd preg was electively terminated, 2nd preg resulted in a healthy child 4 yrs ago. Denies any abdominal pain, vaginal bleeding, fever, chills, other symptoms. Patient denies any other associated signs or symptoms. Patient denies any other complaints. Nursing notes regarding the HPI and triage nursing notes were reviewed. Prior medical records were reviewed.      Current Facility-Administered Medications   Medication Dose Route Frequency Provider Last Rate Last Admin    promethazine (PHENERGAN) 25 mg in 0.9% sodium chloride 50 mL IVPB  25 mg IntraVENous NOW Clifford Mendoza PA        sodium chloride 0.9 % bolus infusion 1,000 mL  1,000 mL IntraVENous ONCE Daryn Mendoza PA        cephALEXin (KEFLEX) capsule 500 mg  500 mg Oral NOW Clifford Mendoza PA         Current Outpatient Medications   Medication Sig Dispense Refill    promethazine (PHENERGAN) 25 mg suppository Insert 1 Suppository into rectum every six (6) hours as needed for Nausea for up to 7 days. 20 Suppository 0    ondansetron (Zofran ODT) 4 mg disintegrating tablet Take 1 Tab by mouth every eight (8) hours as needed for Nausea or Vomiting. 21 Tab 1    cephALEXin (Keflex) 500 mg capsule Take 1 Cap by mouth four (4) times daily for 7 days. Indications: UTI pregnancy 28 Cap 0    doxylamine succinate (Unisom, doxylamine,) 25 mg tablet Take 0.5 Tabs by mouth nightly as needed for Nausea for up to 10 days. Indications: nausea and vomiting of pregnancy 5 Tab 0    pyridoxine, vitamin B6, (VITAMIN B-6) 25 mg tablet Take 1 Tab by mouth every eight (8) hours for 10 days. Indications: hyperemesis gravidarum 30 Tab 0    albuterol (PROVENTIL HFA, VENTOLIN HFA, PROAIR HFA) 90 mcg/actuation inhaler Take 2 Puffs by inhalation every four (4) hours as needed for Wheezing.  1 Inhaler 0       Past History     Past Medical History:  Past Medical History:   Diagnosis Date    Miscarriage        Past Surgical History:  Past Surgical History:   Procedure Laterality Date    HX DILATION AND CURETTAGE  2013       Family History:  Family History   Problem Relation Age of Onset    Colon Cancer Paternal Grandfather        Social History:  Social History     Tobacco Use    Smoking status: Former Smoker    Smokeless tobacco: Never Used   Substance Use Topics    Alcohol use: No    Drug use: No       Allergies:  No Known Allergies    Patient's primary care provider (as noted in EPIC):  Fior Bustillos NP    Review of Systems Constitutional:  Denies malaise, fever, chills. Respiratory:  Denies cough, wheezing, difficulty breathing, shortness of breath. GI/ABD: +nausea, vomiting. Denies injury, pain, distention, diarrhea. :  Denies injury, pain, dysuria or urgency. Back:  Denies injury or pain. Pelvis:  Denies injury or pain. Neuro:  Denies headache, LOC, dizziness, neurologic symptoms/deficits/paresthesias. Skin: Denies injury, rash, itching or skin changes. All other systems negative as reviewed. Visit Vitals  BP (!) 113/56   Pulse 66   Temp 98.4 °F (36.9 °C)   Resp 20   Ht 5' 6\" (1.676 m)   Wt 68 kg (150 lb)   SpO2 98%   BMI 24.21 kg/m²     Physical Exam  Vitals signs and nursing note reviewed. CONSTITUTIONAL:  Alert, appears uncomfortable, emesis bag in hand;  well developed;  well nourished. HEAD:  Normocephalic, atraumatic. EYES:  EOMI. Non-icteric sclera. Normal conjunctiva. NECK:  Supple  RESPIRATORY:  Chest clear, equal breath sounds, good air movement. CARDIOVASCULAR:  Regular rate and rhythm. No murmurs, rubs, or gallops. GI:  Normal bowel sounds, abdomen soft and non-tender. No rebound or guarding. BACK:  Non-tender. NEURO:  Moves all four extremities, and grossly normal motor exam.  SKIN:  No rashes;  Normal for age. PSYCH:  Alert and normal affect. DIFFERENTIAL DIAGNOSES/ MEDICAL DECISION MAKING:  In this patient, given no abdominal pain in pregnant patient with nausea and vomiting, hyperemesis gravidum is high on the differential.    Based on the patient's history of presenting illness, physical examination, laboratory, radiographic, and/or tests results, and response to medical interventions, I believe the patient most likely is having hyperemesis gravidum.     Medications ordered:   Medications   promethazine (PHENERGAN) 25 mg in 0.9% sodium chloride 50 mL IVPB (has no administration in time range)   sodium chloride 0.9 % bolus infusion 1,000 mL (has no administration in time range)   cephALEXin (KEFLEX) capsule 500 mg (has no administration in time range)   sodium chloride 0.9 % bolus infusion 1,000 mL (1,000 mL IntraVENous New Bag 2/27/21 2154)   ondansetron (ZOFRAN) injection 4 mg (4 mg IntraVENous Given 2/27/21 2155)   ondansetron (ZOFRAN) injection 4 mg (4 mg IntraVENous Given 2/28/21 0027)        Lab findings:   Labs Reviewed   URINALYSIS W/ RFLX MICROSCOPIC - Abnormal; Notable for the following components:       Result Value    Protein 30 (*)     Ketone >160 (*)     Leukocyte Esterase SMALL (*)     All other components within normal limits   CBC WITH AUTOMATED DIFF - Abnormal; Notable for the following components:    NEUTROPHILS 79 (*)     LYMPHOCYTES 14 (*)     All other components within normal limits   METABOLIC PANEL, COMPREHENSIVE - Abnormal; Notable for the following components:    BUN/Creatinine ratio 10 (*)     Bilirubin, total 1.2 (*)     Globulin 4.1 (*)     All other components within normal limits   LIPASE - Abnormal; Notable for the following components:    Lipase 70 (*)     All other components within normal limits   URINE MICROSCOPIC ONLY - Abnormal; Notable for the following components:    Bacteria 2+ (*)     Trichomonas 1+ (*)     All other components within normal limits        EKG interpretation:   EKG Results     None           X-Ray, CT or other radiology findings or impressions:   No orders to display        Progress notes, Consult notes or additional Procedure notes:        PROGRESS NOTES:  11:29 PM   Feeling better. Nausea and vomiting resolved with noted antiemetic medication(s). Given IV zofran here with improvement. Pt is not ready to provide urine sample yet. She is in NAD. Will continue to monitor. PROGRESS NOTES:  1:20 AM   No vomiting in ED. Pt is ready to try giving urine sample. PROGRESS NOTES:  1:38 AM   Pt vomited again. Phenergan ordered. Keflex for UTI will be administered here. Pt already has keflex at home.   Will rx suppository Phenergan in addition to Diclegis and Zofran, pt used it before with success. Anticipate dc when NV are controlled, pt will follow-up with OB/GYN, return for any acute worsening. She does not have any abdominal pain or vaginal bleeding. TURN OVER NOTE:   2:00 AM   Consulted with Duane Noland MD ED attending concerning patient Matthias Shahid, standard discussion of reason for visit, HPI, ROS, PE, and current results available. Report was given at the pt's bedside. Provider above will assume care at his time. PENDING: completion of IVF and monitoring of NV, once feeling better, can be dc  LUÍS Abraham      Diagnosis:   1. Hyperemesis gravidarum    2. Urinary tract infection without hematuria, site unspecified      Disposition: Discharge    Follow-up Information     Follow up With Specialties Details Why Contact Lisa Griffin NP Nurse Practitioner In 1 day for recheck of current symptoms [address]  [phone]      [address] [facility] Gynecology In 1 day for recheck of current symptoms 12[address] [facility], [address] [place] [facility] EMERGENCY DEPT Emergency Medicine  As needed, If symptoms worsen [address] [place] [address]  [phone]          Patient's Medications   Start Taking    PROMETHAZINE (PHENERGAN) 25 MG SUPPOSITORY    Insert 1 Suppository into rectum every six (6) hours as needed for Nausea for up to 7 days. Continue Taking    ALBUTEROL (PROVENTIL HFA, VENTOLIN HFA, PROAIR HFA) 90 MCG/ACTUATION INHALER    Take 2 Puffs by inhalation every four (4) hours as needed for Wheezing. CEPHALEXIN (KEFLEX) 500 MG CAPSULE    Take 1 Cap by mouth four (4) times daily for 7 days. Indications: UTI pregnancy    DOXYLAMINE SUCCINATE (UNISOM, DOXYLAMINE,) 25 MG TABLET    Take 0.5 Tabs by mouth nightly as needed for Nausea for up to 10 days.  Indications: nausea and vomiting of pregnancy ONDANSETRON (ZOFRAN ODT) 4 MG DISINTEGRATING TABLET    Take 1 Tab by mouth every eight (8) hours as needed for Nausea or Vomiting. PYRIDOXINE, VITAMIN B6, (VITAMIN B-6) 25 MG TABLET    Take 1 Tab by mouth every eight (8) hours for 10 days. Indications: hyperemesis gravidarum   These Medications have changed    No medications on file   Stop Taking    No medications on file     Vishal Schulte     Dictation disclaimer:  Please note that this dictation was completed with [employer], the computer voice recognition software. Quite often unanticipated grammatical, syntax, homophones, and other interpretive errors are inadvertently transcribed by the computer software. Please disregard these errors. Please excuse any errors that have escaped final proofreading.

## 2021-02-28 NOTE — ED NOTES
I have reviewed discharge instructions with the patient. The patient verbalized understanding. Pt called dustin. Pt A&Ox4.

## 2021-05-27 ENCOUNTER — HOSPITAL ENCOUNTER (EMERGENCY)
Age: 31
Discharge: HOME | End: 2021-05-27
Attending: EMERGENCY MEDICINE
Payer: COMMERCIAL

## 2021-05-27 VITALS
BODY MASS INDEX: 24.21 KG/M2 | OXYGEN SATURATION: 100 % | RESPIRATION RATE: 16 BRPM | TEMPERATURE: 98.7 F | DIASTOLIC BLOOD PRESSURE: 68 MMHG | HEIGHT: 66 IN | SYSTOLIC BLOOD PRESSURE: 131 MMHG | HEART RATE: 93 BPM

## 2021-05-27 DIAGNOSIS — L02.31 CUTANEOUS ABSCESS OF BUTTOCK: Primary | ICD-10-CM

## 2021-05-27 PROCEDURE — 99283 EMERGENCY DEPT VISIT LOW MDM: CPT

## 2021-05-27 PROCEDURE — 74011000250 HC RX REV CODE- 250: Performed by: EMERGENCY MEDICINE

## 2021-05-27 RX ORDER — CEPHALEXIN 500 MG/1
500 CAPSULE ORAL 4 TIMES DAILY
Qty: 40 CAPSULE | Refills: 0 | Status: SHIPPED | OUTPATIENT
Start: 2021-05-27 | End: 2021-06-06

## 2021-05-27 RX ORDER — LIDOCAINE HYDROCHLORIDE 20 MG/ML
JELLY TOPICAL ONCE
Status: COMPLETED | OUTPATIENT
Start: 2021-05-27 | End: 2021-05-27

## 2021-05-27 RX ORDER — SULFAMETHOXAZOLE AND TRIMETHOPRIM 800; 160 MG/1; MG/1
2 TABLET ORAL 2 TIMES DAILY
Qty: 40 TABLET | Refills: 0 | Status: SHIPPED | OUTPATIENT
Start: 2021-05-27 | End: 2021-06-06

## 2021-05-27 RX ADMIN — LIDOCAINE HYDROCHLORIDE: 20 JELLY TOPICAL at 11:56

## 2021-05-27 NOTE — ED NOTES
Radha Nobles is a 32 y.o. female that was discharged in stable condition. The patients diagnosis, condition and treatment were explained to  patient and aftercare instructions were given. The patient verbalized understanding. Patient armband removed and shredded.

## 2021-05-27 NOTE — Clinical Note
[address] [place] [address] [facility] EMERGENCY DEPT 
[address] [facility] [address] [phone] Work/School Note Date: 5/27/2021 To Whom It May concern: 
 
Calvert Dakins was seen and treated today in the emergency room by the following provider(s): 
Attending Provider: Shelley Moeller MD. Calvert Dakins is excused from work/school on 05/27/21 and 05/28/21. She is medically clear to return to work/school on 5/29/2021. Sincerely, Han Monrael MD

## 2021-05-27 NOTE — Clinical Note
[address] [place] [address] [facility] EMERGENCY DEPT 
[address] [facility] [address] [phone] Work/School Note Date: 5/27/2021 To Whom It May concern: 
 
Jay Gillette was seen and treated today in the emergency room by the following provider(s): 
Attending Provider: Juan Weinstein MD. Jay Gillette is excused from work/school on 05/27/21 and 05/28/21. She is medically clear to return to work/school on 5/29/2021. Sincerely, Devon Deleon MD

## 2021-05-27 NOTE — PROGRESS NOTES
conducted an initial consultation and Spiritual Assessment for Esequiel, who is a 31 y.o.,female. Patient's Primary Language is: [place]. According to the patient's EMR [employer] is: Non [employer].     The reason the Patient came to the hospital is:   Patient Active Problem List    Diagnosis Date Noted    Hyperemesis affecting pregnancy, antepartum 02/21/2021    Hyponatremia 08/05/2020    Hypokalemia 08/05/2020    COVID-19 virus infection 08/05/2020    Susy-rectal abscess 08/04/2020    Abscess 05/20/2016    Term pregnancy 07/15/2015    Hyperemesis gravidarum 02/04/2015    Normal pregnancy 11/28/2014        The  provided the following Interventions:  Visited Ms. Bassett during my rounds. Introduced myself and initiated a relationship of care and support. Listened empathically. Encouraged patient to request a  as needed. The following outcomes where achieved:  Patient stated she was fine and expressed gratitude for 's visit. Plan:  Chaplains will continue to follow and will provide pastoral care on an as needed/requested basis.  recommends bedside caregivers page  on duty if patient shows signs of acute spiritual or emotional distress.     [address] [place] [address]   [phone]

## 2021-05-27 NOTE — ED TRIAGE NOTES
Patient c/o abscess on tailbone x 3 days. She has history of getting them. She is planning to schedule surgery for this issue.

## 2021-05-27 NOTE — ED PROVIDER NOTES
EMERGENCY DEPARTMENT HISTORY AND PHYSICAL EXAM    11:41 AM broader coverage, patient seen at this time in room 9      Date: 5/27/2021  Patient Name: Casi Bautista    History of Presenting Illness     Chief Complaint   Patient presents with    Abscess         History Provided By: patient    Additional History (Context): Casi Bautista is a 32 y.o. female presents with has had 6 previous buttocks abscesses in this case, did try to have resection but they were not able to get all of it, she now has 24 hours of pain pressure throbbing sensation of new abscess forming on her right buttock near the gluteal cleft. No drainage no fever. No contributory medical history. She is not pregnant. Stef Knott PCP: Dmitriy Lehman NP    Chief Complaint:   Duration:    Timing:    Location:   Quality:   Severity:   Modifying Factors:   Associated Symptoms:       Current Facility-Administered Medications   Medication Dose Route Frequency Provider Last Rate Last Admin    lidocaine/EPINEPHrine/tetracaine (LET) topical soln  2 mL Topical ONCE Endy Ritchie MD         Current Outpatient Medications   Medication Sig Dispense Refill    cephALEXin (Keflex) 500 mg capsule Take 1 Capsule by mouth four (4) times daily for 10 days. 40 Capsule 0    trimethoprim-sulfamethoxazole (Bactrim DS) 160-800 mg per tablet Take 2 Tablets by mouth two (2) times a day for 10 days. 40 Tablet 0    ondansetron (Zofran ODT) 4 mg disintegrating tablet Take 1 Tab by mouth every eight (8) hours as needed for Nausea or Vomiting. 21 Tab 1    albuterol (PROVENTIL HFA, VENTOLIN HFA, PROAIR HFA) 90 mcg/actuation inhaler Take 2 Puffs by inhalation every four (4) hours as needed for Wheezing.  1 Inhaler 0       Past History     Past Medical History:  Past Medical History:   Diagnosis Date    Miscarriage        Past Surgical History:  Past Surgical History:   Procedure Laterality Date    HX DILATION AND CURETTAGE  2013       Family History:  Family History   Problem Relation Age of Onset    Colon Cancer Paternal Grandfather        Social History:  Social History     Tobacco Use    Smoking status: Former Smoker    Smokeless tobacco: Never Used   Substance Use Topics    Alcohol use: No    Drug use: No       Allergies:  No Known Allergies      Review of Systems     Review of Systems   Constitutional: Negative for diaphoresis and fever. HENT: Negative for congestion and sore throat. Eyes: Negative for pain and itching. Respiratory: Negative for cough and shortness of breath. Cardiovascular: Negative for chest pain and palpitations. Gastrointestinal: Negative for abdominal pain and diarrhea. Endocrine: Negative for polydipsia and polyuria. Genitourinary: Negative for dysuria and hematuria. Musculoskeletal: Negative for arthralgias and myalgias. Skin: Positive for rash. Negative for wound. Neurological: Negative for seizures and syncope. Hematological: Does not bruise/bleed easily. Psychiatric/Behavioral: Negative for agitation and hallucinations. Physical Exam       Patient Vitals for the past 12 hrs:   Temp Pulse Resp BP SpO2   05/27/21 1056 98.7 °F (37.1 °C) 93 16 131/68 100 %       Physical Exam  Vitals and nursing note reviewed. Constitutional:       General: She is not in acute distress. Appearance: She is well-developed. She is not ill-appearing. HENT:      Head: Normocephalic and atraumatic. Eyes:      General: No scleral icterus. Conjunctiva/sclera: Conjunctivae normal.   Neck:      Vascular: No JVD. Cardiovascular:      Rate and Rhythm: Normal rate and regular rhythm. Pulmonary:      Effort: Pulmonary effort is normal. No respiratory distress. Genitourinary:     Comments: 4 cm area of dense slightly tender indurated tissue without fluctuance, no drainable head, no redness or warmth. Dislocated about 5 cm inferior to the coccyx on the right buttock. No drainage.   Musculoskeletal: General: Normal range of motion. Cervical back: Normal range of motion and neck supple. Skin:     General: Skin is warm and dry. Neurological:      Mental Status: She is alert. Psychiatric:         Thought Content: Thought content normal.         Judgment: Judgment normal.           Diagnostic Study Results   Labs -  No results found for this or any previous visit (from the past 12 hour(s)). Radiologic Studies -   No orders to display     No results found. Medications ordered:   Medications   lidocaine/EPINEPHrine/tetracaine (LET) topical soln (has no administration in time range)         Medical Decision Making   Initial Medical Decision Making and DDx:  Discussed I&D discussed ultrasound to further delineate. The patient does not want it drained at this time she wants to try antibiotics first.  She does ask for surgical referral for an attempt at full excision again. So this is provided. She is happy with this plan ready for discharge. ED Course: Progress Notes, Reevaluation, and Consults:         I am the first provider for this patient. I reviewed the vital signs, available nursing notes, past medical history, past surgical history, family history and social history. Patient Vitals for the past 12 hrs:   Temp Pulse Resp BP SpO2   05/27/21 1056 98.7 °F (37.1 °C) 93 16 131/68 100 %       Vital Signs-Reviewed the patient's vital signs. Pulse Oximetry Analysis, Cardiac Monitor, 12 lead ekg:      Interpreted by the EP. Records Reviewed: Nursing notes reviewed (Time of Review: 11:41 AM)    Procedures:   Critical Care Time:   Aspirin: (was aspirin given for stroke?)    Diagnosis     Clinical Impression:   1.  Cutaneous abscess of buttock        Disposition: Discharged      Follow-up Information     Follow up With Specialties Details Why Contact Info    Sharma Babinski, MD Surgery Call today  [address]  [facility]  [address] [facility] [address]  [phone]             Patient's Medications   Start Taking    CEPHALEXIN (KEFLEX) 500 MG CAPSULE    Take 1 Capsule by mouth four (4) times daily for 10 days. TRIMETHOPRIM-SULFAMETHOXAZOLE (BACTRIM DS) 160-800 MG PER TABLET    Take 2 Tablets by mouth two (2) times a day for 10 days. Continue Taking    ALBUTEROL (PROVENTIL HFA, VENTOLIN HFA, PROAIR HFA) 90 MCG/ACTUATION INHALER    Take 2 Puffs by inhalation every four (4) hours as needed for Wheezing. ONDANSETRON (ZOFRAN ODT) 4 MG DISINTEGRATING TABLET    Take 1 Tab by mouth every eight (8) hours as needed for Nausea or Vomiting.    These Medications have changed    No medications on file   Stop Taking    No medications on file     _______________________________    Notes:    Curly Boyd MD using Dragon dictation      _______________________________

## 2021-05-27 NOTE — Clinical Note
[facility] EMERGENCY DEPT 
[address] [facility] [address] [phone] Work/School Note Date: 5/27/2021 To Whom It May concern: 
 
Maricarmen Lopez was seen and treated today in the emergency room by the following provider(s): 
Attending Provider: Rodger Rene MD. Maricarmen Lopez is excused from work/school on 05/27/21 and 05/28/21. She is medically clear to return to work/school on 5/29/2021. Sincerely, Sofy Do MD

## 2021-06-03 ENCOUNTER — HOSPITAL ENCOUNTER (EMERGENCY)
Age: 31
Discharge: HOME | End: 2021-06-03
Attending: EMERGENCY MEDICINE
Payer: COMMERCIAL

## 2021-06-03 VITALS
HEART RATE: 89 BPM | DIASTOLIC BLOOD PRESSURE: 73 MMHG | TEMPERATURE: 98.4 F | BODY MASS INDEX: 25.75 KG/M2 | OXYGEN SATURATION: 100 % | SYSTOLIC BLOOD PRESSURE: 114 MMHG | RESPIRATION RATE: 17 BRPM | HEIGHT: 64 IN

## 2021-06-03 DIAGNOSIS — Z51.89 WOUND CHECK, ABSCESS: Primary | ICD-10-CM

## 2021-06-03 PROCEDURE — 99282 EMERGENCY DEPT VISIT SF MDM: CPT

## 2021-06-03 NOTE — DISCHARGE INSTRUCTIONS
Take medication as prescribed. Apply warm compresses to site. Follow-up with your primary care physician within 2 days for reassessment. Bring the results from this visit with you for their review. Return to the ED immediately for any new, worsening, or persistent symptoms, including fever, increased swelling, or any other medical concerns.

## 2021-06-03 NOTE — LETTER
[facility] EMERGENCY DEPT 
[address] [facility] [address] [phone] Work/School Note Date: 6/3/2021 To Whom It May concern: 
 
Good Harmon was seen and treated today in the emergency room by the following provider(s): 
Attending Provider: Leroy Bruno MD 
Physician Assistant: Kendall Blake, [address] [place], [place] [address]azalia James. Good Harmon may return to work on 6/6/21 with no heavy lifting until symptoms improve.  
 
Sincerely, 
 
 
 
 
LUÍS Warner

## 2021-06-03 NOTE — ED TRIAGE NOTES
Patient presents to ED with abscess to the coccyx region that started two weeks ago. Patient shares that the abscess opened 3 days ago. She is currently on antibiotics. Reports redness to the area. VSS.

## 2021-06-03 NOTE — ED PROVIDER NOTES
EMERGENCY DEPARTMENT HISTORY AND PHYSICAL EXAM    12:33 PM      Date: 6/3/2021  Patient Name: Dustin Brown    History of Presenting Illness     Chief Complaint   Patient presents with    Abscess       History Provided By: Patient    Additional History (Context): Dustin Brown is a 32 y.o. female with noted PMH who presents with complaint of gluteal abscess x 2 weeks. Patient notes she was evaluated in the emergency department on 5/27, notes she has been taking antibiotics as prescribed. Notes the abscess opened and drained 3 days ago. Notes improvement in swelling and pain. Denies fever or chills. Patient notes she is here today for a work note. Notes she is not pregnant. PCP: Abhilash Lira NP    Current Outpatient Medications   Medication Sig Dispense Refill    cephALEXin (Keflex) 500 mg capsule Take 1 Capsule by mouth four (4) times daily for 10 days. 40 Capsule 0    trimethoprim-sulfamethoxazole (Bactrim DS) 160-800 mg per tablet Take 2 Tablets by mouth two (2) times a day for 10 days. 40 Tablet 0    ondansetron (Zofran ODT) 4 mg disintegrating tablet Take 1 Tab by mouth every eight (8) hours as needed for Nausea or Vomiting. 21 Tab 1    albuterol (PROVENTIL HFA, VENTOLIN HFA, PROAIR HFA) 90 mcg/actuation inhaler Take 2 Puffs by inhalation every four (4) hours as needed for Wheezing.  1 Inhaler 0       Past History     Past Medical History:  Past Medical History:   Diagnosis Date    Miscarriage        Past Surgical History:  Past Surgical History:   Procedure Laterality Date    HX DILATION AND CURETTAGE  2013       Family History:  Family History   Problem Relation Age of Onset    Colon Cancer Paternal Grandfather        Social History:  Social History     Tobacco Use    Smoking status: Former Smoker    Smokeless tobacco: Never Used   Substance Use Topics    Alcohol use: No    Drug use: No       Allergies:  No Known Allergies      Review of Systems       Review of Systems Constitutional: Negative for chills and fever. Respiratory: Negative for shortness of breath. Cardiovascular: Negative for chest pain. Gastrointestinal: Negative for abdominal pain, nausea and vomiting. Skin: Positive for color change and wound. Negative for rash. Neurological: Negative for weakness. All other systems reviewed and are negative. Physical Exam     Visit Vitals  /73 (BP 1 Location: Left upper arm, BP Patient Position: At rest)   Pulse 89   Temp 98.4 °F (36.9 °C)   Resp 17   Ht 5' 4\" (1.626 m)   LMP 01/11/2021 (Approximate)   SpO2 100%   BMI 25.75 kg/m²         Physical Exam  Vitals and nursing note reviewed. Constitutional:       General: She is not in acute distress. Appearance: Normal appearance. She is well-developed. She is not ill-appearing, toxic-appearing or diaphoretic. HENT:      Head: Normocephalic and atraumatic. Cardiovascular:      Rate and Rhythm: Normal rate and regular rhythm. Heart sounds: Normal heart sounds. No murmur heard. No friction rub. No gallop. Pulmonary:      Effort: Pulmonary effort is normal. No respiratory distress. Breath sounds: Normal breath sounds. No wheezing or rales. Musculoskeletal:         General: Normal range of motion. Cervical back: Normal range of motion and neck supple. Skin:     General: Skin is warm. Findings: No rash. Comments: R gluteal region with ~1cm region of induration with mild surrounding erythema, no active drainage, no fluctuance, mildly TTP    Neurological:      Mental Status: She is alert. Diagnostic Study Results     Labs -  No results found for this or any previous visit (from the past 12 hour(s)). Radiologic Studies -   No orders to display         Medical Decision Making   I am the first provider for this patient. I reviewed the vital signs, available nursing notes, past medical history, past surgical history, family history and social history.     Vital Signs-Reviewed the patient's vital signs. Records Reviewed: Nursing Notes and Old Medical Records (Time of Review: 12:33 PM)    ED Course: Progress Notes, Reevaluation, and Consults:  12:56 PM  Reviewed plan with patient. Discussed need for close outpatient follow-up this week for reassessment. Discussed strict return precautions, including fever, increased swelling, or any other medical concerns. Provider Notes (Medical Decision Making): 72-year-old female who presents to the ED for wound check of a gluteal abscess. Afebrile, nontoxic-appearing, looks well. Patient notes symptoms have improved significantly. Do not feel I&D is warranted. Stable for discharge with continued use of antibiotics, close outpatient follow-up, and strict return precautions for any new or worsening symptoms. Diagnosis     Clinical Impression:   1. Wound check, abscess        Disposition: home     Follow-up Information     Follow up With Specialties Details Why [address] [facility] EMERGENCY DEPT Emergency Medicine  If symptoms worsen [address] [place] [address], NP Nurse Practitioner Schedule an appointment as soon as possible for a visit   [address]  [phone]             Discharge Medication List as of 6/3/2021 12:13 PM      CONTINUE these medications which have NOT CHANGED    Details   cephALEXin (Keflex) 500 mg capsule Take 1 Capsule by mouth four (4) times daily for 10 days. , Normal, Disp-40 Capsule, R-0      trimethoprim-sulfamethoxazole (Bactrim DS) 160-800 mg per tablet Take 2 Tablets by mouth two (2) times a day for 10 days. , Normal, Disp-40 Tablet, R-0      ondansetron (Zofran ODT) 4 mg disintegrating tablet Take 1 Tab by mouth every eight (8) hours as needed for Nausea or Vomiting., Normal, Disp-21 Tab, R-1      albuterol (PROVENTIL HFA, VENTOLIN HFA, PROAIR HFA) 90 mcg/actuation inhaler Take 2 Puffs by inhalation every four (4) hours as needed for Wheezing., Normal, Disp-1 Inhaler,R-0             Dictation disclaimer:  Please note that this dictation was completed with [employer], the computer voice recognition software. Quite often unanticipated grammatical, syntax, homophones, and other interpretive errors are inadvertently transcribed by the computer software. Please disregard these errors. Please excuse any errors that have escaped final proofreading.

## 2022-01-08 NOTE — TELEPHONE ENCOUNTER
----- Message from Ashley Hawk MD sent at 7/28/2020  6:18 AM EDT -----  Please call the Covid 19 test was positive. The patient should quarantine, for a full 14 days from the day of the test and come in for recheck at that time, August 6, 2020.
TC was  made to pt and pt mother and she will give pt the message to call our office back.
TC was returned from pt and pt verified name and d. o.b pt was made aware of Covid results. And pt will be retested on 08/06/2020
Family

## 2022-03-18 PROBLEM — E87.6 HYPOKALEMIA: Status: ACTIVE | Noted: 2020-08-05

## 2022-03-18 PROBLEM — E87.1 HYPONATREMIA: Status: ACTIVE | Noted: 2020-08-05

## 2022-03-18 PROBLEM — K61.1 PERI-RECTAL ABSCESS: Status: ACTIVE | Noted: 2020-08-04

## 2022-03-19 PROBLEM — U07.1 COVID-19 VIRUS INFECTION: Status: ACTIVE | Noted: 2020-08-05

## 2022-03-20 PROBLEM — O21.0 HYPEREMESIS AFFECTING PREGNANCY, ANTEPARTUM: Status: ACTIVE | Noted: 2021-02-21

## 2023-03-10 ENCOUNTER — HOSPITAL ENCOUNTER (EMERGENCY)
Facility: HOSPITAL | Age: 33
Discharge: HOME | End: 2023-03-10
Attending: STUDENT IN AN ORGANIZED HEALTH CARE EDUCATION/TRAINING PROGRAM
Payer: COMMERCIAL

## 2023-03-10 VITALS
RESPIRATION RATE: 16 BRPM | OXYGEN SATURATION: 100 % | HEIGHT: 66 IN | SYSTOLIC BLOOD PRESSURE: 114 MMHG | DIASTOLIC BLOOD PRESSURE: 61 MMHG | BODY MASS INDEX: 27.32 KG/M2 | HEART RATE: 97 BPM | TEMPERATURE: 97 F | WEIGHT: 170 LBS

## 2023-03-10 DIAGNOSIS — K08.89 PAIN, DENTAL: Primary | ICD-10-CM

## 2023-03-10 PROCEDURE — 99283 EMERGENCY DEPT VISIT LOW MDM: CPT

## 2023-03-10 RX ORDER — CHLORHEXIDINE GLUCONATE 0.12 MG/ML
15 RINSE ORAL 2 TIMES DAILY
Qty: 210 ML | Refills: 0 | Status: SHIPPED | OUTPATIENT
Start: 2023-03-10 | End: 2023-03-17

## 2023-03-10 RX ORDER — AMOXICILLIN 500 MG/1
500 CAPSULE ORAL 2 TIMES DAILY
Qty: 14 CAPSULE | Refills: 0 | Status: SHIPPED | OUTPATIENT
Start: 2023-03-10 | End: 2023-03-17

## 2023-03-10 ASSESSMENT — PAIN - FUNCTIONAL ASSESSMENT: PAIN_FUNCTIONAL_ASSESSMENT: 0-10

## 2023-03-10 ASSESSMENT — ENCOUNTER SYMPTOMS
CHEST TIGHTNESS: 0
SHORTNESS OF BREATH: 0
COUGH: 0
NAUSEA: 0

## 2023-03-10 ASSESSMENT — LIFESTYLE VARIABLES
HOW OFTEN DO YOU HAVE A DRINK CONTAINING ALCOHOL: MONTHLY OR LESS
HOW MANY STANDARD DRINKS CONTAINING ALCOHOL DO YOU HAVE ON A TYPICAL DAY: 1 OR 2

## 2023-03-10 ASSESSMENT — PAIN DESCRIPTION - ORIENTATION: ORIENTATION: LEFT;UPPER;LOWER

## 2023-03-10 ASSESSMENT — PAIN SCALES - GENERAL: PAINLEVEL_OUTOF10: 5

## 2023-03-10 NOTE — ED TRIAGE NOTES
Pt to ED for eval of right upper and lower gum pain x 2 days. Teeth previously extracted. Pt also c/o right toe pain x 2 months.

## 2023-03-10 NOTE — Clinical Note
Calin Ramos was seen and treated in our emergency department on 3/10/2023. She may return to work on 03/11/2023. If you have any questions or concerns, please don't hesitate to call.       Jignesh Melo PA-C

## 2023-03-10 NOTE — ED PROVIDER NOTES
EMERGENCY DEPARTMENT HISTORY AND PHYSICAL EXAM    4:51 PM      Date: 3/10/2023  Patient Name: Roseanna Mcdaniel    History of Presenting Illness     Chief Complaint   Patient presents with    Dental Pain         History Provided By: the patient. Additional History (Context): Roseanna Mcdaniel is a 35 y.o. female with miscarriage presenting to the ED due to dental pain. Patient states that shes been taking advil for pain, but it isn't working. Patient states that she does not have teeth in those areas and was concerned about infection. Patient denies any fever or chills. Denies any other symptoms. Denies any chest pain or shortness of breath. PCP: KAI Mcghee NP    No current facility-administered medications for this encounter. Current Outpatient Medications   Medication Sig Dispense Refill    amoxicillin (AMOXIL) 500 MG capsule Take 1 capsule by mouth 2 times daily for 7 days 14 capsule 0    chlorhexidine (PERIDEX) 0.12 % solution Swish and spit 15 mLs 2 times daily for 7 days 210 mL 0    albuterol sulfate HFA (PROVENTIL;VENTOLIN;PROAIR) 108 (90 Base) MCG/ACT inhaler Inhale 2 puffs into the lungs every 4 hours as needed      ondansetron (ZOFRAN-ODT) 4 MG disintegrating tablet Take 4 mg by mouth every 8 hours as needed         Past History     Past Medical History:  Past Medical History:   Diagnosis Date    Miscarriage        Past Surgical History:  Past Surgical History:   Procedure Laterality Date    DILATION AND CURETTAGE OF UTERUS  2013       Family History:  Family History   Problem Relation Age of Onset    Colon Cancer Paternal Grandfather        Social History:  Social History     Tobacco Use    Smoking status: Former    Smokeless tobacco: Never   Substance Use Topics    Alcohol use: No    Drug use: No       Allergies:  Not on File      Review of Systems       Review of Systems   Constitutional:  Negative for chills and fever. HENT:  Positive for dental problem. Respiratory:  Negative for cough, chest tightness and shortness of breath. Cardiovascular:  Negative for chest pain. Gastrointestinal:  Negative for nausea. Physical Exam   /61   Pulse 97   Temp 97 °F (36.1 °C) (Temporal)   Resp 16   Ht 5' 6\" (1.676 m)   Wt 170 lb (77.1 kg)   LMP 03/02/2023   SpO2 100%   BMI 27.44 kg/m²       Physical Exam  Vitals and nursing note reviewed. Constitutional:       General: She is awake. She is not in acute distress. Appearance: Normal appearance. She is well-developed, well-groomed and normal weight. She is not ill-appearing. HENT:      Head: Normocephalic and atraumatic. Mouth/Throat:      Mouth: Mucous membranes are moist. No oral lesions. Dentition: Abnormal dentition. Dental tenderness and dental caries present. No dental abscesses. Pharynx: Oropharynx is clear. Uvula midline. No pharyngeal swelling, oropharyngeal exudate or posterior oropharyngeal erythema. Tonsils: No tonsillar exudate. Cardiovascular:      Rate and Rhythm: Normal rate and regular rhythm. Pulses: Normal pulses. Heart sounds: Normal heart sounds, S1 normal and S2 normal. No murmur heard. Pulmonary:      Effort: Pulmonary effort is normal. No tachypnea or respiratory distress. Breath sounds: Normal breath sounds and air entry. No decreased breath sounds, wheezing, rhonchi or rales. Musculoskeletal:      Right lower leg: No edema. Left lower leg: No edema. Skin:     Capillary Refill: Capillary refill takes less than 2 seconds. Neurological:      Mental Status: She is alert. Psychiatric:         Behavior: Behavior is cooperative. Diagnostic Study Results     Labs -  No results found for this or any previous visit (from the past 12 hour(s)). Radiologic Studies -   No orders to display         Medical Decision Making   I am the first provider for this patient.     I reviewed the vital signs, available nursing notes, past medical history, past surgical history, family history and social history. Vital Signs-Reviewed the patient's vital signs. Pulse Oximetry Analysis -  100 on room air (Interpretation)    Records Reviewed: Prior medical records(Time of Review: 4:51 PM)    ED Course: Progress Notes, Reevaluation, and Consults:  Ddx: Dental abscess, dental carry,    Provider Notes (Medical Decision Making):   Patient is a 79-year-old female with a history of miscarriage presenting to the emergency department due to dental pain. On exam, patient is alert, oriented x3, no acute distress. Heart regular rate and rhythm. Lungs clear to auscultation bilaterally. Presence of multiple dental caries. No dental abscess identified. Tenderness on palpation of the left side lower jaw. Posterior pharynx without erythema, exudate, or swelling. Patient instructed to alternate Tylenol and ibuprofen as needed for pain. Prescribed amoxicillin twice daily x7 days to cover for infection. Patient also prescribed Peridex swish and spit to be done twice a day for 7 days to help decrease bacteria. Instructed to follow-up with both PCP and dentist within the next few days in order to be reevaluated. Patient displays understanding and is in agreement with plan and discharge at this time. Vital signs reassessed prior to discharge and are within normal limits. Diagnosis     Clinical Impression:   1.  Pain, dental        Disposition: Discharge home with dental follow-up    KAI West NP      Follow up from ER    [facility] EMERGENCY DEPT  [address] [place] [address]  [phone]    As needed, If symptoms worsen        Medication List        START taking these medications      amoxicillin 500 MG capsule  Commonly known as: AMOXIL  Take 1 capsule by mouth 2 times daily for 7 days     chlorhexidine 0.12 % solution  Commonly known as: Peridex  Swish and spit 15 mLs 2 times daily for 7 days            ASK your doctor about these medications      albuterol sulfate  (90 Base) MCG/ACT inhaler  Commonly known as: PROVENTIL;VENTOLIN;PROAIR     ondansetron 4 MG disintegrating tablet  Commonly known as: ZOFRAN-ODT               Where to Get Your Medications        These medications were sent to [address] [address] [facility] [place]      Phone: [phone]   amoxicillin 500 MG capsule  chlorhexidine 0.12 % solution          Dictation disclaimer:  Please note that this dictation was completed with [employer], the [email or web address] voice recognition software. Quite often unanticipated grammatical, syntax, homophones, and other interpretive errors are inadvertently transcribed by the computer software. Please disregard these errors. Please excuse any errors that have escaped final proofreading.         Trevon Cisneros PA-C  03/10/23 [id number]

## 2023-03-10 NOTE — DISCHARGE INSTRUCTIONS
Alternate tylenol and ibuprofen as needed for pain. Place ice on the affected area to help decrease pain. Begin taking Amoxicillin twice a day for 7 days. Use Chlorhexidine swish and spit twice a day for 7 days. Follow-up with both Dentist and PCP within the next few days in order to be re-evaluated. Return to the ED with any new or worsening symptoms.

## 2023-03-10 NOTE — ED NOTES
Pt given written and verbal DC instructions. Questions answered. Pt verbalized understanding.       Dahiana Arias RN  03/10/23 3989

## 2023-08-14 ENCOUNTER — OFFICE VISIT (OUTPATIENT)
Age: 33
End: 2023-08-14
Payer: COMMERCIAL

## 2023-08-14 ENCOUNTER — HOSPITAL ENCOUNTER (OUTPATIENT)
Facility: HOSPITAL | Age: 33
Setting detail: SPECIMEN
Discharge: HOME | End: 2023-08-17
Payer: COMMERCIAL

## 2023-08-14 VITALS
OXYGEN SATURATION: 95 % | HEART RATE: 79 BPM | DIASTOLIC BLOOD PRESSURE: 67 MMHG | SYSTOLIC BLOOD PRESSURE: 101 MMHG | WEIGHT: 188.4 LBS | RESPIRATION RATE: 18 BRPM | HEIGHT: 66 IN | BODY MASS INDEX: 30.28 KG/M2 | TEMPERATURE: 98.2 F

## 2023-08-14 DIAGNOSIS — F43.21 GRIEF: ICD-10-CM

## 2023-08-14 DIAGNOSIS — L84 CALLUS: ICD-10-CM

## 2023-08-14 DIAGNOSIS — K21.9 GASTROESOPHAGEAL REFLUX DISEASE, UNSPECIFIED WHETHER ESOPHAGITIS PRESENT: ICD-10-CM

## 2023-08-14 DIAGNOSIS — Z11.59 ENCOUNTER FOR HEPATITIS C SCREENING TEST FOR LOW RISK PATIENT: Primary | ICD-10-CM

## 2023-08-14 DIAGNOSIS — R53.83 FATIGUE, UNSPECIFIED TYPE: ICD-10-CM

## 2023-08-14 DIAGNOSIS — L05.91 CHRONIC RECURRENT PILONIDAL CYST: ICD-10-CM

## 2023-08-14 DIAGNOSIS — Z12.4 CERVICAL CANCER SCREENING: ICD-10-CM

## 2023-08-14 PROBLEM — E87.1 HYPONATREMIA: Status: RESOLVED | Noted: 2020-08-05 | Resolved: 2023-08-14

## 2023-08-14 PROBLEM — K61.1 PERI-RECTAL ABSCESS: Status: RESOLVED | Noted: 2020-08-04 | Resolved: 2023-08-14

## 2023-08-14 PROBLEM — O21.0 HYPEREMESIS AFFECTING PREGNANCY, ANTEPARTUM: Status: RESOLVED | Noted: 2021-02-21 | Resolved: 2023-08-14

## 2023-08-14 PROBLEM — E87.6 HYPOKALEMIA: Status: RESOLVED | Noted: 2020-08-05 | Resolved: 2023-08-14

## 2023-08-14 PROBLEM — U07.1 COVID-19 VIRUS INFECTION: Status: RESOLVED | Noted: 2020-08-05 | Resolved: 2023-08-14

## 2023-08-14 LAB
25(OH)D3 SERPL-MCNC: 12.2 NG/ML (ref 30–100)
ALBUMIN SERPL-MCNC: 4 G/DL (ref 3.4–5)
ALBUMIN/GLOB SERPL: 1.1 (ref 0.8–1.7)
ALP SERPL-CCNC: 55 U/L (ref 45–117)
ALT SERPL-CCNC: 17 U/L (ref 13–56)
ANION GAP SERPL CALC-SCNC: 3 MMOL/L (ref 3–18)
APPEARANCE UR: CLEAR
AST SERPL-CCNC: 9 U/L (ref 10–38)
BACTERIA URNS QL MICRO: NEGATIVE /HPF
BASOPHILS # BLD: 0 K/UL (ref 0–0.1)
BASOPHILS NFR BLD: 1 % (ref 0–2)
BILIRUB SERPL-MCNC: 0.5 MG/DL (ref 0.2–1)
BILIRUB UR QL: NEGATIVE
BUN SERPL-MCNC: 15 MG/DL (ref 7–18)
BUN/CREAT SERPL: 19 (ref 12–20)
CALCIUM SERPL-MCNC: 8.8 MG/DL (ref 8.5–10.1)
CHLORIDE SERPL-SCNC: 105 MMOL/L (ref 100–111)
CO2 SERPL-SCNC: 28 MMOL/L (ref 21–32)
COLOR UR: YELLOW
CREAT SERPL-MCNC: 0.81 MG/DL (ref 0.6–1.3)
DIFFERENTIAL METHOD BLD: ABNORMAL
EOSINOPHIL # BLD: 0.1 K/UL (ref 0–0.4)
EOSINOPHIL NFR BLD: 3 % (ref 0–5)
EPITH CASTS URNS QL MICRO: NEGATIVE /LPF (ref 0–5)
ERYTHROCYTE [DISTWIDTH] IN BLOOD BY AUTOMATED COUNT: 13.2 % (ref 11.6–14.5)
EST. AVERAGE GLUCOSE BLD GHB EST-MCNC: 108 MG/DL
FOLATE SERPL-MCNC: 7.5 NG/ML (ref 3.1–17.5)
GLOBULIN SER CALC-MCNC: 3.7 G/DL (ref 2–4)
GLUCOSE SERPL-MCNC: 100 MG/DL (ref 74–99)
GLUCOSE UR STRIP.AUTO-MCNC: NEGATIVE MG/DL
HBA1C MFR BLD: 5.4 % (ref 4.2–5.6)
HCT VFR BLD AUTO: 39.9 % (ref 35–45)
HGB BLD-MCNC: 13.3 G/DL (ref 12–16)
HGB UR QL STRIP: NEGATIVE
IMM GRANULOCYTES # BLD AUTO: 0 K/UL (ref 0–0.04)
IMM GRANULOCYTES NFR BLD AUTO: 0 % (ref 0–0.5)
IRON SATN MFR SERPL: 24 % (ref 20–50)
IRON SERPL-MCNC: 78 UG/DL (ref 50–175)
KETONES UR QL STRIP.AUTO: NEGATIVE MG/DL
LEUKOCYTE ESTERASE UR QL STRIP.AUTO: NEGATIVE
LYMPHOCYTES # BLD: 1.4 K/UL (ref 0.9–3.6)
LYMPHOCYTES NFR BLD: 40 % (ref 21–52)
MCH RBC QN AUTO: 29.9 PG (ref 24–34)
MCHC RBC AUTO-ENTMCNC: 33.3 G/DL (ref 31–37)
MCV RBC AUTO: 89.7 FL (ref 78–100)
MONOCYTES # BLD: 0.3 K/UL (ref 0.05–1.2)
MONOCYTES NFR BLD: 7 % (ref 3–10)
NEUTS SEG # BLD: 1.7 K/UL (ref 1.8–8)
NEUTS SEG NFR BLD: 49 % (ref 40–73)
NITRITE UR QL STRIP.AUTO: NEGATIVE
NRBC # BLD: 0 K/UL (ref 0–0.01)
NRBC BLD-RTO: 0 PER 100 WBC
PH UR STRIP: 6.5 (ref 5–8)
PLATELET # BLD AUTO: 230 K/UL (ref 135–420)
PMV BLD AUTO: 10.2 FL (ref 9.2–11.8)
POTASSIUM SERPL-SCNC: 3.9 MMOL/L (ref 3.5–5.5)
PROT SERPL-MCNC: 7.7 G/DL (ref 6.4–8.2)
PROT UR STRIP-MCNC: NEGATIVE MG/DL
RBC # BLD AUTO: 4.45 M/UL (ref 4.2–5.3)
RBC #/AREA URNS HPF: NEGATIVE /HPF (ref 0–5)
SODIUM SERPL-SCNC: 136 MMOL/L (ref 136–145)
SP GR UR REFRACTOMETRY: 1.03 (ref 1–1.03)
TIBC SERPL-MCNC: 326 UG/DL (ref 250–450)
TSH SERPL DL<=0.05 MIU/L-ACNC: 0.25 UIU/ML (ref 0.36–3.74)
UROBILINOGEN UR QL STRIP.AUTO: 0.2 EU/DL (ref 0.2–1)
VIT B12 SERPL-MCNC: 330 PG/ML (ref 211–911)
WBC # BLD AUTO: 3.6 K/UL (ref 4.6–13.2)
WBC URNS QL MICRO: NEGATIVE /HPF (ref 0–4)

## 2023-08-14 PROCEDURE — 87591 N.GONORRHOEAE DNA AMP PROB: CPT

## 2023-08-14 PROCEDURE — 86803 HEPATITIS C AB TEST: CPT

## 2023-08-14 PROCEDURE — 82306 VITAMIN D 25 HYDROXY: CPT

## 2023-08-14 PROCEDURE — 99204 OFFICE O/P NEW MOD 45 MIN: CPT | Performed by: NURSE PRACTITIONER

## 2023-08-14 PROCEDURE — 87350 HEPATITIS BE AG IA: CPT

## 2023-08-14 PROCEDURE — 87661 TRICHOMONAS VAGINALIS AMPLIF: CPT

## 2023-08-14 PROCEDURE — 82607 VITAMIN B-12: CPT

## 2023-08-14 PROCEDURE — 82746 ASSAY OF FOLIC ACID SERUM: CPT

## 2023-08-14 PROCEDURE — 87389 HIV-1 AG W/HIV-1&-2 AB AG IA: CPT

## 2023-08-14 PROCEDURE — 83540 ASSAY OF IRON: CPT

## 2023-08-14 PROCEDURE — 83550 IRON BINDING TEST: CPT

## 2023-08-14 PROCEDURE — 36415 COLL VENOUS BLD VENIPUNCTURE: CPT

## 2023-08-14 PROCEDURE — 81001 URINALYSIS AUTO W/SCOPE: CPT

## 2023-08-14 PROCEDURE — 83036 HEMOGLOBIN GLYCOSYLATED A1C: CPT

## 2023-08-14 PROCEDURE — 84443 ASSAY THYROID STIM HORMONE: CPT

## 2023-08-14 PROCEDURE — 80053 COMPREHEN METABOLIC PANEL: CPT

## 2023-08-14 PROCEDURE — 85025 COMPLETE CBC W/AUTO DIFF WBC: CPT

## 2023-08-14 PROCEDURE — 87491 CHLMYD TRACH DNA AMP PROBE: CPT

## 2023-08-14 PROCEDURE — 86780 TREPONEMA PALLIDUM: CPT

## 2023-08-14 RX ORDER — SULFAMETHOXAZOLE AND TRIMETHOPRIM 800; 160 MG/1; MG/1
TABLET ORAL
COMMUNITY
Start: 2023-07-23

## 2023-08-14 RX ORDER — AMOXICILLIN AND CLAVULANATE POTASSIUM 500; 125 MG/1; MG/1
TABLET, FILM COATED ORAL
COMMUNITY
Start: 2023-07-23

## 2023-08-14 RX ORDER — FAMOTIDINE 20 MG/1
20 TABLET, FILM COATED ORAL DAILY
Qty: 90 TABLET | Refills: 3 | Status: SHIPPED | OUTPATIENT
Start: 2023-08-14

## 2023-08-14 SDOH — ECONOMIC STABILITY: FOOD INSECURITY: WITHIN THE PAST 12 MONTHS, THE FOOD YOU BOUGHT JUST DIDN'T LAST AND YOU DIDN'T HAVE MONEY TO GET MORE.: OFTEN TRUE

## 2023-08-14 SDOH — ECONOMIC STABILITY: INCOME INSECURITY: HOW HARD IS IT FOR YOU TO PAY FOR THE VERY BASICS LIKE FOOD, HOUSING, MEDICAL CARE, AND HEATING?: VERY HARD

## 2023-08-14 SDOH — ECONOMIC STABILITY: FOOD INSECURITY: WITHIN THE PAST 12 MONTHS, YOU WORRIED THAT YOUR FOOD WOULD RUN OUT BEFORE YOU GOT MONEY TO BUY MORE.: OFTEN TRUE

## 2023-08-14 SDOH — ECONOMIC STABILITY: HOUSING INSECURITY
IN THE LAST 12 MONTHS, WAS THERE A TIME WHEN YOU DID NOT HAVE A STEADY PLACE TO SLEEP OR SLEPT IN A SHELTER (INCLUDING NOW)?: YES

## 2023-08-14 ASSESSMENT — ENCOUNTER SYMPTOMS
NAUSEA: 0
ABDOMINAL PAIN: 0
SHORTNESS OF BREATH: 0
COUGH: 0
VOMITING: 0
CHEST TIGHTNESS: 0

## 2023-08-14 NOTE — PROGRESS NOTES
Suad Holden (:  1990) is a 35 y.o. female, here for evaluation of the following medical concerns:  Chief Complaint   Patient presents with    Fatigue    Hip Pain         ASSESSMENT/PLAN:  1. Encounter for hepatitis C screening test for low risk patient      2. Fatigue, unspecified type    - CBC with Auto Differential; Future  - Comprehensive Metabolic Panel; Future  - Hemoglobin A1C; Future  - TSH; Future  - Urinalysis with Microscopic; Future  - Hepatitis C Antibody; Future  - HIV 1/2 Ag/Ab, 4TH Generation,W Rflx Confirm; Future  - Iron and TIBC; Future  - Vitamin B12 & Folate; Future  - Vitamin D 25 Hydroxy; Future  - Hepatitis Be antigen; Future  - HIV 1/2 Ag/Ab, 4TH Generation,W Rflx Confirm; Future  - T. pallidum Ab; Future  - Chlamydia, Gonorrhea, Trichomoniasis; Future    3. Cervical cancer screening    - External Referral To Gynecology    4. Chronic recurrent pilonidal cyst    - [facility] - Simone Duenas MD, Colon & Rectal Surgery, [place]    5. Gastroesophageal reflux disease, unspecified whether esophagitis present    - famotidine (PEPCID) 20 MG tablet; Take 1 tablet by mouth daily  Dispense: 90 tablet; Refill: 3    6. Grief    - External Referral To Psychology    7. Callus    - External Referral To Podiatry      Return in about 3 months (around 2023) for [address]. HPI  She is a new patient to the clinic. She is with concerns of fatigue. Drinking water, unsure if needs a multivitamin. Onset few years. Thought iron was low. Menses- heavy and regular, 7 days sometimes 5, denies clots, horrible pain with vomiting first few days, about 3rd day cramping resolves and then returns on day 5, going thru 5 heavy pads a day. Ref gyn. Does not drink a lot of caffeine. She is current to cyst to coccyx region and  referred to a specialist today Dr. Darlene Cifuentes. Looks like Augmentin and Bactrim were prescribed 2023.     She is working [facility] [employer]

## 2023-08-14 NOTE — PROGRESS NOTES
1. \"Have you been to the ER, urgent care clinic since your last visit? Hospitalized since your last visit? \"  [facility]    2. \"Have you seen or consulted any other health care providers outside of the [address] [place] [address] since your last visit? \" No     3. For patients aged 43-73: Has the patient had a colonoscopy / FIT/ Cologuard? NA - based on age      If the patient is female:    4. For patients aged 43-66: Has the patient had a mammogram within the past 2 years? NA - based on age or sex      11. For patients aged 21-65: Has the patient had a pap smear? Yes - Care Gap present.  Rooming MA/LPN to request most recent results

## 2023-08-15 LAB
C TRACH RRNA SPEC QL NAA+PROBE: NEGATIVE
HCV AB SER IA-ACNC: 0.05 INDEX
HCV AB SERPL QL IA: NEGATIVE
HEPATITIS C COMMENT: NORMAL
HIV 1+2 AB+HIV1 P24 AG SERPL QL IA: NONREACTIVE
HIV 1/2 RESULT COMMENT: NORMAL
N GONORRHOEA RRNA SPEC QL NAA+PROBE: NEGATIVE
SPECIMEN SOURCE: NORMAL
T PALLIDUM AB SER QL IA: NONREACTIVE

## 2023-08-16 LAB — HBV E AG SERPL QL IA: NEGATIVE

## 2023-08-18 LAB
SPECIMEN SOURCE: NORMAL
T VAGINALIS RRNA SPEC QL NAA+PROBE: NEGATIVE

## 2023-08-26 DIAGNOSIS — R79.89 LOW TSH LEVEL: Primary | ICD-10-CM

## 2023-08-26 DIAGNOSIS — E55.9 VITAMIN D DEFICIENCY: ICD-10-CM

## 2023-08-26 RX ORDER — ERGOCALCIFEROL 1.25 MG/1
50000 CAPSULE ORAL WEEKLY
Qty: 12 CAPSULE | Refills: 1 | Status: SHIPPED | OUTPATIENT
Start: 2023-08-26

## 2023-09-14 DIAGNOSIS — Z12.4 CERVICAL CANCER SCREENING: Primary | ICD-10-CM

## 2023-12-11 ENCOUNTER — OFFICE VISIT (OUTPATIENT)
Age: 33
End: 2023-12-11
Payer: COMMERCIAL

## 2023-12-11 VITALS
RESPIRATION RATE: 17 BRPM | SYSTOLIC BLOOD PRESSURE: 114 MMHG | HEART RATE: 82 BPM | BODY MASS INDEX: 30.7 KG/M2 | DIASTOLIC BLOOD PRESSURE: 58 MMHG | HEIGHT: 66 IN | OXYGEN SATURATION: 98 % | WEIGHT: 191 LBS | TEMPERATURE: 97.5 F

## 2023-12-11 DIAGNOSIS — L05.91 CHRONIC RECURRENT PILONIDAL CYST: Primary | ICD-10-CM

## 2023-12-11 PROCEDURE — 99203 OFFICE O/P NEW LOW 30 MIN: CPT | Performed by: COLON & RECTAL SURGERY

## 2023-12-11 RX ORDER — DOXYCYCLINE HYCLATE 100 MG/1
CAPSULE ORAL
COMMUNITY
Start: 2023-11-20

## 2023-12-11 NOTE — PROGRESS NOTES
HPI: Yancy Roberts is a 35 y.o. female presenting with chief complain of current pilonidal abscess. This is gone on for several years. I drained this in 2020. She has had multiple I&D's and sinus interested happening in thighs completely. Past Medical History:   Diagnosis Date    Abnormal finding of foot     growth on right foot, planning on surgical removal of growth    Miscarriage        Past Surgical History:   Procedure Laterality Date    CYST INCISION AND DRAINAGE      multiple pilonidal cysts/abscesses    DILATION AND CURETTAGE OF UTERUS  2013       Family History   Problem Relation Age of Onset    Colon Cancer Paternal Grandfather        Social History     Socioeconomic History    Marital status: Single     Spouse name: None    Number of children: None    Years of education: None    Highest education level: None   Tobacco Use    Smoking status: Some Days     Types: Cigarettes    Smokeless tobacco: Never   Substance and Sexual Activity    Alcohol use: Yes    Drug use: No     Social Determinants of Health     Financial Resource Strain: High Risk (8/14/2023)    Overall Financial Resource Strain (CARDIA)     Difficulty of Paying Living Expenses: Very hard   Food Insecurity:   Recent Concern: Food Insecurity - Food Insecurity Present (8/14/2023)    Hunger Vital Sign     Worried About Running Out of Food in the Last Year: Often true     Ran Out of Food in the Last Year: Often true   Transportation Needs: Unknown (8/14/2023)    PRAPARE - Transportation     Lack of Transportation (Non-Medical): No   Housing Stability: High Risk (8/14/2023)    Housing Stability Vital Sign     Unstable Housing in the Last Year: Yes       Current Outpatient Medications   Medication Instructions    amoxicillin-clavulanate (AUGMENTIN) 500-125 MG per tablet No dose, route, or frequency recorded.     diclofenac (VOLTAREN) 50 MG EC tablet     doxycycline hyclate (VIBRAMYCIN) 100 MG capsule     famotidine (PEPCID) 20 mg, Oral,

## 2024-01-12 NOTE — PERIOP NOTE
Instructions for your surgery at [facility]      Today's Date:  1/12/2024      Patient's Name:  Ifrah Abreu           Surgery Date:  1/19/2024              Please enter the main entrance of the hospital and check-in at the  located in the lobby. Once checked in at the , you will take the elevators to the second floor, and report to the waiting room on the left. The room will say Procedure Registration.    Do NOT eat or drink anything, including candy, gum, or ice chips after midnight prior to your surgery, unless you have specific instructions from your surgeon or anesthesia provider to do so.  Brush your teeth before coming to the hospital. You may swish with water, but do not swallow.  No smoking/Vaping/E-Cigarettes 24 hours prior to the day of surgery.  No alcohol 24 hours prior to the day of surgery.  No recreational drugs for one week prior to the day of surgery.  Bring Photo ID, Insurance information, and Co-pay if required on day of surgery.  Bring in pertinent legal documents, such as, Medical Power of , DNR, Advance Directive, etc.  Leave all valuables, including money/purse, at home.  Remove all jewelry, including ALL body piercings, nail polish, acrylic nails, and makeup (including mascara); no lotions, powders, deodorant, or perfume/cologne/after shave on the skin.  Follow instruction for Hibiclens washes and CHG wipes from surgeon's office.   Glasses and dentures may be worn to the hospital. They must be removed prior to surgery. Please bring case/container for glasses or dentures.   Contact lenses should not be worn on day of surgery.   Call your doctor's office if symptoms of a cold or illness develop within 24-48 hours prior to your surgery.  Call your doctor's office if you have any questions concerning insurance or co-pays.  15. AN ADULT (relative or friend 18 years or older) MUST DRIVE YOU HOME AFTER YOUR SURGERY.  16. Please make

## 2024-01-18 ENCOUNTER — TELEPHONE (OUTPATIENT)
Age: 34
End: 2024-01-18

## 2024-01-18 ENCOUNTER — ANESTHESIA EVENT (OUTPATIENT)
Facility: HOSPITAL | Age: 34
End: 2024-01-18
Payer: COMMERCIAL

## 2024-01-18 NOTE — TELEPHONE ENCOUNTER
S/W pt and confirmed surgery date and time. Pt advised nothing to eat or drink after midnight.

## 2024-01-19 ENCOUNTER — HOME HEALTH ADMISSION (OUTPATIENT)
Age: 34
End: 2024-01-19
Payer: COMMERCIAL

## 2024-01-19 ENCOUNTER — HOSPITAL ENCOUNTER (OUTPATIENT)
Facility: HOSPITAL | Age: 34
Setting detail: OUTPATIENT SURGERY
Discharge: HOME | End: 2024-01-19
Attending: COLON & RECTAL SURGERY | Admitting: COLON & RECTAL SURGERY
Payer: COMMERCIAL

## 2024-01-19 ENCOUNTER — ANESTHESIA (OUTPATIENT)
Facility: HOSPITAL | Age: 34
End: 2024-01-19
Payer: COMMERCIAL

## 2024-01-19 VITALS
HEART RATE: 70 BPM | OXYGEN SATURATION: 99 % | RESPIRATION RATE: 15 BRPM | WEIGHT: 192 LBS | DIASTOLIC BLOOD PRESSURE: 77 MMHG | TEMPERATURE: 98 F | SYSTOLIC BLOOD PRESSURE: 132 MMHG | HEIGHT: 66 IN | BODY MASS INDEX: 30.86 KG/M2

## 2024-01-19 DIAGNOSIS — L05.91 CHRONIC RECURRENT PILONIDAL CYST: Primary | ICD-10-CM

## 2024-01-19 LAB
AMPHET UR QL SCN: NEGATIVE
BARBITURATES UR QL SCN: NEGATIVE
BENZODIAZ UR QL: NEGATIVE
CANNABINOIDS UR QL SCN: POSITIVE
COCAINE UR QL SCN: NEGATIVE
HCG UR QL: NEGATIVE
HCG UR QL: NEGATIVE
Lab: ABNORMAL
METHADONE UR QL: NEGATIVE
OPIATES UR QL: NEGATIVE
PCP UR QL: NEGATIVE

## 2024-01-19 PROCEDURE — 6370000000 HC RX 637 (ALT 250 FOR IP): Performed by: COLON & RECTAL SURGERY

## 2024-01-19 PROCEDURE — 3600000012 HC SURGERY LEVEL 2 ADDTL 15MIN: Performed by: COLON & RECTAL SURGERY

## 2024-01-19 PROCEDURE — 7100000001 HC PACU RECOVERY - ADDTL 15 MIN: Performed by: COLON & RECTAL SURGERY

## 2024-01-19 PROCEDURE — 81025 URINE PREGNANCY TEST: CPT

## 2024-01-19 PROCEDURE — 6360000002 HC RX W HCPCS: Performed by: NURSE ANESTHETIST, CERTIFIED REGISTERED

## 2024-01-19 PROCEDURE — 7100000000 HC PACU RECOVERY - FIRST 15 MIN: Performed by: COLON & RECTAL SURGERY

## 2024-01-19 PROCEDURE — 3700000000 HC ANESTHESIA ATTENDED CARE: Performed by: COLON & RECTAL SURGERY

## 2024-01-19 PROCEDURE — 2500000003 HC RX 250 WO HCPCS: Performed by: NURSE ANESTHETIST, CERTIFIED REGISTERED

## 2024-01-19 PROCEDURE — 6370000000 HC RX 637 (ALT 250 FOR IP): Performed by: NURSE ANESTHETIST, CERTIFIED REGISTERED

## 2024-01-19 PROCEDURE — 11771 EXC PILONIDAL CYST XTNSV: CPT | Performed by: COLON & RECTAL SURGERY

## 2024-01-19 PROCEDURE — 2500000003 HC RX 250 WO HCPCS: Performed by: COLON & RECTAL SURGERY

## 2024-01-19 PROCEDURE — 80307 DRUG TEST PRSMV CHEM ANLYZR: CPT

## 2024-01-19 PROCEDURE — 2709999900 HC NON-CHARGEABLE SUPPLY: Performed by: COLON & RECTAL SURGERY

## 2024-01-19 PROCEDURE — 6360000002 HC RX W HCPCS: Performed by: COLON & RECTAL SURGERY

## 2024-01-19 PROCEDURE — 3700000001 HC ADD 15 MINUTES (ANESTHESIA): Performed by: COLON & RECTAL SURGERY

## 2024-01-19 PROCEDURE — 7100000010 HC PHASE II RECOVERY - FIRST 15 MIN: Performed by: COLON & RECTAL SURGERY

## 2024-01-19 PROCEDURE — 88304 TISSUE EXAM BY PATHOLOGIST: CPT

## 2024-01-19 PROCEDURE — 3600000002 HC SURGERY LEVEL 2 BASE: Performed by: COLON & RECTAL SURGERY

## 2024-01-19 PROCEDURE — 2580000003 HC RX 258: Performed by: COLON & RECTAL SURGERY

## 2024-01-19 PROCEDURE — 7100000011 HC PHASE II RECOVERY - ADDTL 15 MIN: Performed by: COLON & RECTAL SURGERY

## 2024-01-19 PROCEDURE — 2580000003 HC RX 258: Performed by: NURSE ANESTHETIST, CERTIFIED REGISTERED

## 2024-01-19 RX ORDER — LIDOCAINE HYDROCHLORIDE 10 MG/ML
1 INJECTION, SOLUTION EPIDURAL; INFILTRATION; INTRACAUDAL; PERINEURAL
Status: DISCONTINUED | OUTPATIENT
Start: 2024-01-19 | End: 2024-01-19 | Stop reason: HOSPADM

## 2024-01-19 RX ORDER — SODIUM CHLORIDE, SODIUM LACTATE, POTASSIUM CHLORIDE, CALCIUM CHLORIDE 600; 310; 30; 20 MG/100ML; MG/100ML; MG/100ML; MG/100ML
INJECTION, SOLUTION INTRAVENOUS CONTINUOUS
Status: DISCONTINUED | OUTPATIENT
Start: 2024-01-19 | End: 2024-01-19 | Stop reason: HOSPADM

## 2024-01-19 RX ORDER — OXYCODONE HYDROCHLORIDE AND ACETAMINOPHEN 5; 325 MG/1; MG/1
1 TABLET ORAL ONCE
Status: COMPLETED | OUTPATIENT
Start: 2024-01-19 | End: 2024-01-19

## 2024-01-19 RX ORDER — PROPOFOL 10 MG/ML
INJECTION, EMULSION INTRAVENOUS PRN
Status: DISCONTINUED | OUTPATIENT
Start: 2024-01-19 | End: 2024-01-19 | Stop reason: SDUPTHER

## 2024-01-19 RX ORDER — SODIUM CHLORIDE 0.9 % (FLUSH) 0.9 %
5-40 SYRINGE (ML) INJECTION PRN
Status: DISCONTINUED | OUTPATIENT
Start: 2024-01-19 | End: 2024-01-19 | Stop reason: HOSPADM

## 2024-01-19 RX ORDER — PROCHLORPERAZINE EDISYLATE 5 MG/ML
10 INJECTION INTRAMUSCULAR; INTRAVENOUS
Status: DISCONTINUED | OUTPATIENT
Start: 2024-01-19 | End: 2024-01-19 | Stop reason: HOSPADM

## 2024-01-19 RX ORDER — DEXAMETHASONE SODIUM PHOSPHATE 4 MG/ML
INJECTION, SOLUTION INTRA-ARTICULAR; INTRALESIONAL; INTRAMUSCULAR; INTRAVENOUS; SOFT TISSUE PRN
Status: DISCONTINUED | OUTPATIENT
Start: 2024-01-19 | End: 2024-01-19 | Stop reason: SDUPTHER

## 2024-01-19 RX ORDER — GLYCOPYRROLATE 0.2 MG/ML
INJECTION INTRAMUSCULAR; INTRAVENOUS PRN
Status: DISCONTINUED | OUTPATIENT
Start: 2024-01-19 | End: 2024-01-19 | Stop reason: SDUPTHER

## 2024-01-19 RX ORDER — FAMOTIDINE 20 MG/1
20 TABLET, FILM COATED ORAL ONCE
Status: COMPLETED | OUTPATIENT
Start: 2024-01-19 | End: 2024-01-19

## 2024-01-19 RX ORDER — ONDANSETRON 2 MG/ML
INJECTION INTRAMUSCULAR; INTRAVENOUS PRN
Status: DISCONTINUED | OUTPATIENT
Start: 2024-01-19 | End: 2024-01-19 | Stop reason: SDUPTHER

## 2024-01-19 RX ORDER — LIDOCAINE HYDROCHLORIDE 20 MG/ML
INJECTION, SOLUTION EPIDURAL; INFILTRATION; INTRACAUDAL; PERINEURAL PRN
Status: DISCONTINUED | OUTPATIENT
Start: 2024-01-19 | End: 2024-01-19 | Stop reason: SDUPTHER

## 2024-01-19 RX ORDER — ROCURONIUM BROMIDE 10 MG/ML
INJECTION, SOLUTION INTRAVENOUS PRN
Status: DISCONTINUED | OUTPATIENT
Start: 2024-01-19 | End: 2024-01-19 | Stop reason: SDUPTHER

## 2024-01-19 RX ORDER — DEXMEDETOMIDINE HYDROCHLORIDE 100 UG/ML
INJECTION, SOLUTION INTRAVENOUS PRN
Status: DISCONTINUED | OUTPATIENT
Start: 2024-01-19 | End: 2024-01-19 | Stop reason: SDUPTHER

## 2024-01-19 RX ORDER — ONDANSETRON 2 MG/ML
4 INJECTION INTRAMUSCULAR; INTRAVENOUS
Status: DISCONTINUED | OUTPATIENT
Start: 2024-01-19 | End: 2024-01-19 | Stop reason: HOSPADM

## 2024-01-19 RX ORDER — OXYCODONE HYDROCHLORIDE AND ACETAMINOPHEN 5; 325 MG/1; MG/1
1 TABLET ORAL EVERY 4 HOURS PRN
Qty: 40 TABLET | Refills: 0 | Status: SHIPPED | OUTPATIENT
Start: 2024-01-19 | End: 2024-01-26

## 2024-01-19 RX ORDER — SUCCINYLCHOLINE/SOD CL,ISO/PF 100 MG/5ML
SYRINGE (ML) INTRAVENOUS PRN
Status: DISCONTINUED | OUTPATIENT
Start: 2024-01-19 | End: 2024-01-19 | Stop reason: SDUPTHER

## 2024-01-19 RX ORDER — FENTANYL CITRATE 50 UG/ML
50 INJECTION, SOLUTION INTRAMUSCULAR; INTRAVENOUS EVERY 5 MIN PRN
Status: DISCONTINUED | OUTPATIENT
Start: 2024-01-19 | End: 2024-01-19 | Stop reason: HOSPADM

## 2024-01-19 RX ORDER — FENTANYL CITRATE 50 UG/ML
INJECTION, SOLUTION INTRAMUSCULAR; INTRAVENOUS PRN
Status: DISCONTINUED | OUTPATIENT
Start: 2024-01-19 | End: 2024-01-19 | Stop reason: SDUPTHER

## 2024-01-19 RX ORDER — SODIUM CHLORIDE 9 MG/ML
INJECTION, SOLUTION INTRAVENOUS PRN
Status: DISCONTINUED | OUTPATIENT
Start: 2024-01-19 | End: 2024-01-19 | Stop reason: HOSPADM

## 2024-01-19 RX ORDER — SODIUM CHLORIDE 0.9 % (FLUSH) 0.9 %
5-40 SYRINGE (ML) INJECTION EVERY 12 HOURS SCHEDULED
Status: DISCONTINUED | OUTPATIENT
Start: 2024-01-19 | End: 2024-01-19 | Stop reason: HOSPADM

## 2024-01-19 RX ORDER — DOXYCYCLINE HYCLATE 100 MG
100 TABLET ORAL 2 TIMES DAILY
Qty: 14 TABLET | Refills: 0 | Status: SHIPPED | OUTPATIENT
Start: 2024-01-19 | End: 2024-01-26

## 2024-01-19 RX ADMIN — LIDOCAINE HYDROCHLORIDE 100 MG: 20 INJECTION, SOLUTION EPIDURAL; INFILTRATION; INTRACAUDAL; PERINEURAL at 07:38

## 2024-01-19 RX ADMIN — ROCURONIUM BROMIDE 10 MG: 10 INJECTION, SOLUTION INTRAVENOUS at 07:38

## 2024-01-19 RX ADMIN — OXYCODONE AND ACETAMINOPHEN 1 TABLET: 5; 325 TABLET ORAL at 11:51

## 2024-01-19 RX ADMIN — ONDANSETRON 4 MG: 2 INJECTION INTRAMUSCULAR; INTRAVENOUS at 07:50

## 2024-01-19 RX ADMIN — SODIUM CHLORIDE, POTASSIUM CHLORIDE, SODIUM LACTATE AND CALCIUM CHLORIDE: 600; 310; 30; 20 INJECTION, SOLUTION INTRAVENOUS at 06:40

## 2024-01-19 RX ADMIN — FAMOTIDINE 20 MG: 20 TABLET ORAL at 06:40

## 2024-01-19 RX ADMIN — WATER 2000 MG: 1 INJECTION, SOLUTION INTRAMUSCULAR; INTRAVENOUS; SUBCUTANEOUS at 07:45

## 2024-01-19 RX ADMIN — DEXAMETHASONE SODIUM PHOSPHATE 4 MG: 4 INJECTION INTRA-ARTICULAR; INTRALESIONAL; INTRAMUSCULAR; INTRAVENOUS; SOFT TISSUE at 07:50

## 2024-01-19 RX ADMIN — Medication 140 MG: at 07:38

## 2024-01-19 RX ADMIN — PROPOFOL 200 MG: 10 INJECTION, EMULSION INTRAVENOUS at 07:38

## 2024-01-19 RX ADMIN — FENTANYL CITRATE 50 MCG: 50 INJECTION INTRAMUSCULAR; INTRAVENOUS at 07:38

## 2024-01-19 RX ADMIN — DEXMEDETOMIDINE HYDROCHLORIDE 6 MCG: 100 INJECTION, SOLUTION INTRAVENOUS at 08:03

## 2024-01-19 RX ADMIN — DEXMEDETOMIDINE HYDROCHLORIDE 4 MCG: 100 INJECTION, SOLUTION INTRAVENOUS at 08:52

## 2024-01-19 RX ADMIN — FENTANYL CITRATE 50 MCG: 50 INJECTION INTRAMUSCULAR; INTRAVENOUS at 07:26

## 2024-01-19 RX ADMIN — GLYCOPYRROLATE 0.2 MG: 0.2 INJECTION INTRAMUSCULAR; INTRAVENOUS at 07:26

## 2024-01-19 RX ADMIN — BENZOCAINE AND MENTHOL 2 LOZENGE: 15; 3.6 LOZENGE ORAL at 11:14

## 2024-01-19 RX ADMIN — DEXMEDETOMIDINE HYDROCHLORIDE 10 MCG: 100 INJECTION, SOLUTION INTRAVENOUS at 07:26

## 2024-01-19 ASSESSMENT — PAIN SCALES - GENERAL
PAINLEVEL_OUTOF10: 6

## 2024-01-19 ASSESSMENT — LIFESTYLE VARIABLES: SMOKING_STATUS: 1

## 2024-01-19 ASSESSMENT — PAIN - FUNCTIONAL ASSESSMENT
PAIN_FUNCTIONAL_ASSESSMENT: 0-10
PAIN_FUNCTIONAL_ASSESSMENT: ACTIVITIES ARE NOT PREVENTED
PAIN_FUNCTIONAL_ASSESSMENT: ACTIVITIES ARE NOT PREVENTED

## 2024-01-19 ASSESSMENT — PAIN DESCRIPTION - LOCATION
LOCATION: BUTTOCKS
LOCATION: THROAT

## 2024-01-19 ASSESSMENT — PAIN DESCRIPTION - DESCRIPTORS
DESCRIPTORS: SORE
DESCRIPTORS: SORE

## 2024-01-19 ASSESSMENT — PAIN DESCRIPTION - ORIENTATION: ORIENTATION: MID;UPPER

## 2024-01-19 NOTE — HOME CARE
Received home health referral for [facility] for (SN - drain care).  Discharging from Phase II.    Spoke with patient in room ;  patient identifiers verified.  Explained home health care services and routines.  Demographics verified including insurance, phone and address confirmed.   Caregivers available friends and family.  Orders noted and arranged and sent to central intake and scheduling.      ---  JULISSA Salcedo Home Care Liaison

## 2024-01-19 NOTE — PERIOP NOTE
Patient /Family /Designee has been informed that [facility] is not responsible for patient belongings per policy and the signed [facility] Patient Agreement document.  Personal items should be sent home or checked in with security.  Patient /Family /Designee selected the following action:                            []  Send personal items home with a family member or friend                                                 []  Check in personal items with security, excluding clothing                            [x]  Maintain personal items at the bedside, against recommendation                                 by Jozef Rehman [facility]                                   ** If patient /family /designee chooses to maintain personal items at the bedside,                                      Complete the patient belongings inventory in the EMR.

## 2024-01-19 NOTE — DISCHARGE INSTRUCTIONS
Remove dressing over wound in 2 days  No showers for 3 days  Monitor drain outputs daily  Take antibiotics for 7 days  Follow up in office 2 weeks      DISCHARGE SUMMARY from Nurse    PATIENT INSTRUCTIONS:    After general anesthesia or intravenous sedation, for 24 hours or while taking prescription Narcotics:  Limit your activities  Do not drive and operate hazardous machinery  Do not make important personal or business decisions  Do  not drink alcoholic beverages  If you have not urinated within 8 hours after discharge, please contact your surgeon on call.    Report the following to your surgeon:  Excessive pain, swelling, redness or odor of or around the surgical area  Temperature over 100.5  Nausea and vomiting lasting longer than 4 hours or if unable to take medications  Any signs of decreased circulation or nerve impairment to extremity: change in color, persistent  numbness, tingling, coldness or increase pain  Any questions          These are general instructions for a healthy lifestyle:    No smoking/ No tobacco products/ Avoid exposure to second hand smoke  Surgeon General's Warning:  Quitting smoking now greatly reduces serious risk to your health.    Obesity, smoking, and sedentary lifestyle greatly increases your risk for illness    A healthy diet, regular physical exercise & weight monitoring are important for maintaining a healthy lifestyle    You may be retaining fluid if you have a history of heart failure or if you experience any of the following symptoms:  Weight gain of 3 pounds or more overnight or 5 pounds in a week, increased swelling in our hands or feet or shortness of breath while lying flat in bed.  Please call your doctor as soon as you notice any of these symptoms; do not wait until your next office visit.        The discharge information has been reviewed with the patient and boyfriend.  The patient and boyfriend verbalized understanding.  Discharge medications reviewed with the patient  and boyfriend and appropriate educational materials and side effects teaching were provided.  ___________________________________________________________________________________________________________________________________         Surgical Drain Care: Care Instructions  Your Care Instructions     After a surgery, fluid may collect inside your body in the surgical area. This makes an infection or other problems more likely. A surgical drain allows the fluid to flow out.  The doctor puts a thin, flexible rubber tube into the area of your body where the fluid is likely to collect. The rubber tube carries the fluid outside your body.  The most common type of surgical drain carries the fluid into a collection bulb that you empty. This is called a Neftali-Luna (CLEMENTINA) drain. The drain uses suction created by the bulb to pull the fluid from your body into the bulb. The rubber tube will probably be held in place by one or two stitches in your skin. The bulb will probably be attached with a safety pin to your clothes or near the bandage so that it doesn't flip around or pull on the stitches.  Another type of drain is called a Penrose drain. This type of drain doesn't have a bulb. Instead, the end of the tube is open. That allows the fluid to drain onto a dressing taped to your skin. The drain may be kept in place next to your skin with a stitch or a safety pin in the tube.  When you first get the drain, the fluid will be bloody. It will change color from red to pink to a light yellow or clear as the wound heals and the fluid starts to go away.  Your doctor may give you information on when you no longer need the drain and when it will be removed.  Follow-up care is a key part of your treatment and safety. Be sure to make and go to all appointments, and call your doctor if you are having problems. It's also a good idea to know your test results and keep a list of the medicines you take.  How do you empty the bulb of a

## 2024-01-19 NOTE — H&P
HPI: Ifrah Abreu is a 34 y.o. female presenting with chief complain of pilonidal cyst.    Past Medical History:   Diagnosis Date    Abnormal finding of foot     growth on right foot, planning on surgical removal of growth    Miscarriage        Past Surgical History:   Procedure Laterality Date    CYST INCISION AND DRAINAGE      multiple pilonidal cysts/abscesses    DILATION AND CURETTAGE OF UTERUS  2013       Family History   Problem Relation Age of Onset    Colon Cancer Paternal Grandfather        Social History     Socioeconomic History    Marital status: Single     Spouse name: None    Number of children: None    Years of education: None    Highest education level: None   Tobacco Use    Smoking status: Some Days     Types: Cigarettes    Smokeless tobacco: Never   Vaping Use    Vaping Use: Never used   Substance and Sexual Activity    Alcohol use: Yes     Comment: seldom    Drug use: Yes     Types: Marijuana (Weed)     Comment: occ     Social Determinants of Health     Financial Resource Strain: High Risk (8/14/2023)    Overall Financial Resource Strain (CARDIA)     Difficulty of Paying Living Expenses: Very hard   Food Insecurity:   Recent Concern: Food Insecurity - Food Insecurity Present (8/14/2023)    Hunger Vital Sign     Worried About Running Out of Food in the Last Year: Often true     Ran Out of Food in the Last Year: Often true   Transportation Needs: Unknown (8/14/2023)    PRAPARE - Transportation     Lack of Transportation (Non-Medical): No   Housing Stability: High Risk (8/14/2023)    Housing Stability Vital Sign     Unstable Housing in the Last Year: Yes       Current Outpatient Medications   Medication Instructions    amoxicillin-clavulanate (AUGMENTIN) 500-125 MG per tablet No dose, route, or frequency recorded.    diclofenac (VOLTAREN) 50 MG EC tablet     doxycycline hyclate (VIBRAMYCIN) 100 MG capsule     famotidine (PEPCID) 20 mg, Oral, DAILY    sulfamethoxazole-trimethoprim (BACTRIM

## 2024-01-19 NOTE — ANESTHESIA POSTPROCEDURE EVALUATION
Department of Anesthesiology  Postprocedure Note    Patient: Ifrah Abreu  MRN: [id number]  YOB: 1990  Date of evaluation: 1/19/2024    Procedure Summary       Date: 01/19/24 Room / Location: [facility] MAIN 04 / [facility] MAIN OR    Anesthesia Start: 0730 Anesthesia Stop: 0929    Procedure: PILONIDAL CYSTECTOMY (Buttocks) Diagnosis:       Chronic recurrent pilonidal cyst      (Chronic recurrent pilonidal cyst [L05.91])    Surgeons: German Matias MD Responsible Provider: Sarah Richter MD    Anesthesia Type: General ASA Status: 2            Anesthesia Type: General    Som Phase I: Som Score: 10    Som Phase II:      Anesthesia Post Evaluation    Patient location during evaluation: PACU  Patient participation: complete - patient participated  Level of consciousness: awake and alert  Pain score: 4  Airway patency: patent  Nausea & Vomiting: no nausea and no vomiting  Cardiovascular status: hemodynamically stable  Respiratory status: acceptable  Hydration status: euvolemic  Multimodal analgesia pain management approach  Pain management: adequate    No notable events documented.

## 2024-01-19 NOTE — OP NOTE
[facility]  OPERATIVE REPORT    Name:  ALLIE CAMPBELL  MR#:   [id number]  :  1990  ACCOUNT #:  [id number]  DATE OF SERVICE:  2024    PREOPERATIVE DIAGNOSIS:   Recurrent pilonidal cyst.    POSTOPERATIVE DIAGNOSIS:  Recurrent pilonidal cyst.    PROCEDURE PERFORMED:  Pilonidal cystectomy with flap closure.    SURGEON:  German Matias MD.    ASSISTANT:  Vianca Eduardo.    ANESTHESIA:  General.    COMPLICATIONS:  None.    SPECIMENS REMOVED:  Cyst to Pathology.    IMPLANTS:  None.    ESTIMATED BLOOD LOSS:  50 mL.    FINDINGS:  Very large pilonidal cyst.    INDICATIONS:  The patient is a 34-year-old woman with chronic recurrent pilonidal cyst.  She has had previously been drained.  There was a very large cavity notable on prior drainages.  I explained the risks to the patient including bleeding, infection and recurrence.  I told this was a very large cyst that would require extensive excision.  She understood and wished to proceed.    DESCRIPTION OF PROCEDURE:  The patient was properly identified in the holding area and brought to the operating room.  She was intubated on the stretcher, placed in the prone jackknife position.  Buttocks were taped apart.  Intergluteal region was prepped and draped in the usual sterile fashion.  There were two extensions on either side of the intergluteal cleft. Initially, we attempted to excise the pilonidal cyst excluding these.  We made an initial incision with a 10-blade, carried this down through subcutaneous tissues and circumferentially excised the midline portion of the cyst.  We subsequently began to raise flaps of subcutaneous tissues and identified two additional cavities into the two external incision sites.  We therefore required a more extensive and wider excision to include these cavities.  Both of these additional cavities were removed and upon completion, this was fairly large cavity remaining, but all cyst wall was removed.  We then irrigated

## 2024-01-19 NOTE — CARE COORDINATION
Home health orders noted. Spoke with Tam rojas with BS and she stated is able to accept patient. Home health orders place in the queue. Tam rojas states she will come see pt in phase 2 prior to discharge to discuss HH.    Teri Redmond RN, BSN, [facility]  Case Management

## 2024-01-19 NOTE — BRIEF OP NOTE
Brief Postoperative Note      Patient: Ifrah Abreu  YOB: 1990  MRN: [id number]    Date of Procedure: 1/19/2024    Pre-Op Diagnosis Codes:     * Chronic recurrent pilonidal cyst [L05.91]    Post-Op Diagnosis: Same       Procedure(s):  PILONIDAL CYSTECTOMY    Surgeon(s):  German Matias MD    Assistant:  Surgical Assistant: Gaby Eduardo    Anesthesia: General    Estimated Blood Loss (mL): less than 50     Complications: None    Specimens:   ID Type Source Tests Collected by Time Destination   A : PILONIDAL CYST Tissue Tissue SURGICAL PATHOLOGY German Matias MD 1/19/2024 0756        Implants:  * No implants in log *      Drains:   Closed/Suction Drain Buttock Bulb (Active)       Findings: cyst    [id number]    Electronically signed by German Matias MD on 1/19/2024 at 9:28 AM

## 2024-01-20 ENCOUNTER — HOME CARE VISIT (OUTPATIENT)
Age: 34
End: 2024-01-20
Payer: COMMERCIAL

## 2024-01-20 PROCEDURE — G0299 HHS/HOSPICE OF RN EA 15 MIN: HCPCS

## 2024-01-20 ASSESSMENT — ENCOUNTER SYMPTOMS
HEMOPTYSIS: 0
PAIN LOCATION - PAIN QUALITY: DULL

## 2024-01-21 VITALS
DIASTOLIC BLOOD PRESSURE: 79 MMHG | RESPIRATION RATE: 18 BRPM | TEMPERATURE: 97 F | HEART RATE: 67 BPM | SYSTOLIC BLOOD PRESSURE: 110 MMHG | OXYGEN SATURATION: 95 %

## 2024-01-22 ENCOUNTER — HOME CARE VISIT (OUTPATIENT)
Age: 34
End: 2024-01-22
Payer: COMMERCIAL

## 2024-01-22 PROCEDURE — G0299 HHS/HOSPICE OF RN EA 15 MIN: HCPCS

## 2024-01-23 VITALS
RESPIRATION RATE: 16 BRPM | SYSTOLIC BLOOD PRESSURE: 98 MMHG | OXYGEN SATURATION: 96 % | TEMPERATURE: 97 F | DIASTOLIC BLOOD PRESSURE: 46 MMHG | HEART RATE: 92 BPM

## 2024-01-24 NOTE — HOME HEALTH
Skilled reason for visit:DRAIN CARE AND SURGICAL ASSESSMENT.  Caregiver involvement:COOKS, CLEANS AND TAKES PATIENT TO MD APPT.    Medications reviewed and all medications are available in the home this visit.    The following education was provided regarding medications:  INSTRUCTED ON IMPORTANCE OF MEDICATION COMPLIANCY..    MD notified of any discrepancies/look a-like medications/medication interactions: NA  Medications are IN HOME AND EFFECTIVE  at this time.      Home health supplies by type and quantity ordered/delivered this visit include: NA    Patient education provided this visit: drain care,  EMPTY DRAIN BEFORE IT GETS TOO FULL,SEE NSG INTERVENTIONS, INSTRUCTED ON S/S OF INFECTION ATDRAIN SITE.    Sharps education provided: YES    Patient level of understanding of education provided:GOOD, VERBALIZED UNDERSTANDING.    Skilled Care Performed this visit ASSESSMENT AND TEACHING ON DSG CHANGES, RECORDING AMOUT OF DRAINAGE FROM CLEMENTINA DRAIN. 120CC OVER 24 HOURS LAST PM SEE NSG INTERVENTIONS.  IS TAKING CARE OF CLEMENTINA DRAIN.    Patient response to procedure performed:  GOOD.      Patient's Progress towards personal goals: PROGRESSING.    Home exercise program: DEEP BREATHING EXERCISES TO HELP PREVENT PNEUMONIA.    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    Plan for next visit: ASSESSMENT OF SURGICAL SITE. SUTURES INTACT AND TEACHING.     Patient and/or caregiver notified and agrees to changes in the Plan of Care: Yes.     The following discharge planning was discussed with the pt/caregiver: PATIENT WILL BE DISCHARGED WHEN GOALS ARE MET AND PATIENT IS STABLE.

## 2024-01-26 ENCOUNTER — HOME CARE VISIT (OUTPATIENT)
Age: 34
End: 2024-01-26
Payer: COMMERCIAL

## 2024-01-26 PROCEDURE — G0299 HHS/HOSPICE OF RN EA 15 MIN: HCPCS

## 2024-01-28 VITALS
RESPIRATION RATE: 18 BRPM | OXYGEN SATURATION: 99 % | DIASTOLIC BLOOD PRESSURE: 62 MMHG | TEMPERATURE: 97.8 F | HEART RATE: 70 BPM | SYSTOLIC BLOOD PRESSURE: 105 MMHG

## 2024-02-01 ENCOUNTER — HOME CARE VISIT (OUTPATIENT)
Age: 34
End: 2024-02-01
Payer: COMMERCIAL

## 2024-02-01 PROCEDURE — G0299 HHS/HOSPICE OF RN EA 15 MIN: HCPCS

## 2024-02-03 VITALS
RESPIRATION RATE: 16 BRPM | SYSTOLIC BLOOD PRESSURE: 96 MMHG | TEMPERATURE: 97 F | DIASTOLIC BLOOD PRESSURE: 46 MMHG | OXYGEN SATURATION: 97 % | HEART RATE: 88 BPM

## 2024-02-03 ASSESSMENT — ENCOUNTER SYMPTOMS: PAIN LOCATION - PAIN QUALITY: ACHE

## 2024-02-05 ENCOUNTER — OFFICE VISIT (OUTPATIENT)
Age: 34
End: 2024-02-05

## 2024-02-05 VITALS
HEIGHT: 66 IN | OXYGEN SATURATION: 100 % | HEART RATE: 97 BPM | WEIGHT: 199 LBS | SYSTOLIC BLOOD PRESSURE: 110 MMHG | TEMPERATURE: 97.3 F | RESPIRATION RATE: 17 BRPM | BODY MASS INDEX: 31.98 KG/M2 | DIASTOLIC BLOOD PRESSURE: 62 MMHG

## 2024-02-05 DIAGNOSIS — L05.91 CHRONIC RECURRENT PILONIDAL CYST: Primary | ICD-10-CM

## 2024-02-05 PROCEDURE — 99024 POSTOP FOLLOW-UP VISIT: CPT | Performed by: COLON & RECTAL SURGERY

## 2024-02-05 NOTE — PROGRESS NOTES
Subjective: No complaints    Past medical history and ROS were reviewed and unchanged.     Perineal wound is, part at the perianal pole of the wound, sutures are removed but granulation tissue appears healthy, remainder of wound intact  CLEMENTINA drain removed, patient states 10 mL/day of output    Assessment / Plan    Status post pilonidal cystectomy  Wet-to-dry dressings to the lower pole of the wound  Follow-up in the office in 10 days for suture removal    The diagnoses and plan were discussed with patient.  All questions answered.  Plan of care agreed to by all concerned.

## 2024-02-15 ENCOUNTER — OFFICE VISIT (OUTPATIENT)
Age: 34
End: 2024-02-15

## 2024-02-15 VITALS
SYSTOLIC BLOOD PRESSURE: 116 MMHG | TEMPERATURE: 97.6 F | OXYGEN SATURATION: 98 % | BODY MASS INDEX: 31.66 KG/M2 | WEIGHT: 197 LBS | HEART RATE: 65 BPM | HEIGHT: 66 IN | RESPIRATION RATE: 17 BRPM | DIASTOLIC BLOOD PRESSURE: 68 MMHG

## 2024-02-15 DIAGNOSIS — L05.91 CHRONIC RECURRENT PILONIDAL CYST: Primary | ICD-10-CM

## 2024-02-15 PROCEDURE — 99024 POSTOP FOLLOW-UP VISIT: CPT | Performed by: COLON & RECTAL SURGERY

## 2024-02-15 NOTE — PROGRESS NOTES
Subjective: Denies drainage from the wound.    Past medical history and ROS were reviewed and unchanged.     Intergluteal cleft wound well-healed, remaining sutures removed    Assessment / Plan    Status post excision of a very large pilonidal cyst  Follow-up as needed    The diagnoses and plan were discussed with patient.  All questions answered.  Plan of care agreed to by all concerned.

## 2024-10-09 ENCOUNTER — OFFICE VISIT (OUTPATIENT)
Facility: CLINIC | Age: 34
End: 2024-10-09

## 2024-10-09 ENCOUNTER — TELEPHONE (OUTPATIENT)
Facility: CLINIC | Age: 34
End: 2024-10-09

## 2024-10-09 VITALS
HEART RATE: 89 BPM | SYSTOLIC BLOOD PRESSURE: 106 MMHG | OXYGEN SATURATION: 98 % | HEIGHT: 66 IN | TEMPERATURE: 97.9 F | WEIGHT: 204 LBS | BODY MASS INDEX: 32.78 KG/M2 | RESPIRATION RATE: 16 BRPM | DIASTOLIC BLOOD PRESSURE: 71 MMHG

## 2024-10-09 DIAGNOSIS — Z13.228 SCREENING FOR ENDOCRINE, NUTRITIONAL, METABOLIC AND IMMUNITY DISORDER: Primary | ICD-10-CM

## 2024-10-09 DIAGNOSIS — E55.9 VITAMIN D DEFICIENCY: ICD-10-CM

## 2024-10-09 DIAGNOSIS — Z13.21 SCREENING FOR ENDOCRINE, NUTRITIONAL, METABOLIC AND IMMUNITY DISORDER: Primary | ICD-10-CM

## 2024-10-09 DIAGNOSIS — Z13.0 SCREENING FOR ENDOCRINE, NUTRITIONAL, METABOLIC AND IMMUNITY DISORDER: Primary | ICD-10-CM

## 2024-10-09 DIAGNOSIS — K21.9 GASTROESOPHAGEAL REFLUX DISEASE, UNSPECIFIED WHETHER ESOPHAGITIS PRESENT: ICD-10-CM

## 2024-10-09 DIAGNOSIS — Z13.29 SCREENING FOR ENDOCRINE, NUTRITIONAL, METABOLIC AND IMMUNITY DISORDER: Primary | ICD-10-CM

## 2024-10-09 DIAGNOSIS — R79.89 LOW TSH LEVEL: ICD-10-CM

## 2024-10-09 DIAGNOSIS — R20.8 BURNING SENSATION OF TOE AND FOOT: ICD-10-CM

## 2024-10-09 RX ORDER — FAMOTIDINE 20 MG/1
20 TABLET, FILM COATED ORAL DAILY
Qty: 90 TABLET | Refills: 3 | Status: SHIPPED | OUTPATIENT
Start: 2024-10-09

## 2024-10-09 NOTE — PROGRESS NOTES
Chief Complaint   Patient presents with    Blood Sugar Problem     States she consumes a diet high in sugar daily. Drinks a starbucks drink while at work. Worried about becoming a diabetic. PMH of diabetes; great grand mother on maternal side.     Heartburn     Ongoing issue. Has not been taking Pepcid.        \"Have you been to the ER, urgent care clinic since your last visit?  Hospitalized since your last visit?\"    NO    “Have you seen or consulted any other health care providers outside our system since your last visit?”    NO     “Have you had a pap smear?”    NO    Date of last Cervical Cancer screen (HPV or PAP): 10/23/2014

## 2024-10-09 NOTE — PROGRESS NOTES
Ifrah Abreu (:  1990) is a 34 y.o. female, here for evaluation of the following medical concerns:  Chief Complaint   Patient presents with    Blood Sugar Problem     States she consumes a diet high in sugar daily. Drinks a starbucks drink while at work. Worried about becoming a diabetic. PMH of diabetes; great grand mother on maternal side.     Heartburn     Ongoing issue. Has not been taking Pepcid.          ASSESSMENT/PLAN:    1. Screening for endocrine, nutritional, metabolic and immunity disorder  -     CBC with Auto Differential; Future  -     Comprehensive Metabolic Panel; Future  -     Hemoglobin A1C; Future  -     Lipid Panel; Future  -     TSH; Future  -     Urinalysis with Microscopic; Future  2. Gastroesophageal reflux disease, unspecified whether esophagitis present  -     famotidine (PEPCID) 20 MG tablet; Take 1 tablet by mouth daily, Disp-90 tablet, R-3Normal  3. Vitamin D deficiency  -     Vitamin D 25 Hydroxy; Future  -     Vitamin B12 & Folate; Future  4. Low TSH level  -     TSH + Free T4 Panel; Future  -     T3; Future  -     Vitamin B12 & Folate; Future  5. Burning sensation of toe and foot  -     Vitamin B12 & Folate; Future     Return in about 2 weeks (around 10/23/2024) for ANNUAL PHYSICAL, lab reviiew, depression.        HPI  History of depression, pilonidal cysts.    She is a single mother of a 9 year old. Father incarcerated.    She is here today for concerns of diabetes.     She is over due for annual physical exam, labs ordered today.    She is with fatigue, polyuria, burning in all toes bilateral feet. Thought was on her feet too much because working a lot. She did order new shoes.   Needs to cut down on caffeine  Stopped smoking cigarettes- stopped cold turkey  Has facial hair on chin and chest    Feelings of depression, crying moment, insomnia, anxiousness, running on empty, overwhelmed with family demands, SI thoughts no plan  -- GAD7  --PHQ9--  I want her to think

## 2024-10-09 NOTE — TELEPHONE ENCOUNTER
Patient called back regarding the conversation that she and the provider had regarding intermittent fasting, would like the nurse to call her back at [phone]

## 2024-10-23 ENCOUNTER — HOSPITAL ENCOUNTER (OUTPATIENT)
Facility: HOSPITAL | Age: 34
Setting detail: SPECIMEN
Discharge: HOME | End: 2024-10-26
Payer: COMMERCIAL

## 2024-10-23 ENCOUNTER — OFFICE VISIT (OUTPATIENT)
Facility: CLINIC | Age: 34
End: 2024-10-23

## 2024-10-23 VITALS
DIASTOLIC BLOOD PRESSURE: 67 MMHG | SYSTOLIC BLOOD PRESSURE: 99 MMHG | BODY MASS INDEX: 32.95 KG/M2 | RESPIRATION RATE: 18 BRPM | HEART RATE: 97 BPM | WEIGHT: 205 LBS | OXYGEN SATURATION: 95 % | TEMPERATURE: 98 F | HEIGHT: 66 IN

## 2024-10-23 DIAGNOSIS — Z13.21 SCREENING FOR ENDOCRINE, NUTRITIONAL, METABOLIC AND IMMUNITY DISORDER: ICD-10-CM

## 2024-10-23 DIAGNOSIS — Z12.4 CERVICAL CANCER SCREENING: ICD-10-CM

## 2024-10-23 DIAGNOSIS — L67.8 ABNORMAL FACIAL HAIR: ICD-10-CM

## 2024-10-23 DIAGNOSIS — Z13.228 SCREENING FOR ENDOCRINE, NUTRITIONAL, METABOLIC AND IMMUNITY DISORDER: ICD-10-CM

## 2024-10-23 DIAGNOSIS — Z13.29 SCREENING FOR ENDOCRINE, NUTRITIONAL, METABOLIC AND IMMUNITY DISORDER: ICD-10-CM

## 2024-10-23 DIAGNOSIS — Z13.0 SCREENING FOR ENDOCRINE, NUTRITIONAL, METABOLIC AND IMMUNITY DISORDER: ICD-10-CM

## 2024-10-23 DIAGNOSIS — E55.9 VITAMIN D DEFICIENCY: ICD-10-CM

## 2024-10-23 DIAGNOSIS — Z00.00 ANNUAL PHYSICAL EXAM: Primary | ICD-10-CM

## 2024-10-23 DIAGNOSIS — R20.8 BURNING SENSATION OF TOE AND FOOT: ICD-10-CM

## 2024-10-23 DIAGNOSIS — R79.89 LOW TSH LEVEL: ICD-10-CM

## 2024-10-23 LAB
25(OH)D3 SERPL-MCNC: 15.6 NG/ML (ref 30–100)
ALBUMIN SERPL-MCNC: 3.7 G/DL (ref 3.4–5)
ALBUMIN/GLOB SERPL: 1.1 (ref 0.8–1.7)
ALP SERPL-CCNC: 65 U/L (ref 45–117)
ALT SERPL-CCNC: 21 U/L (ref 13–56)
ANION GAP SERPL CALC-SCNC: 7 MMOL/L (ref 3–18)
APPEARANCE UR: ABNORMAL
AST SERPL-CCNC: 14 U/L (ref 10–38)
BACTERIA URNS QL MICRO: ABNORMAL /HPF
BASOPHILS # BLD: 0 K/UL (ref 0–0.1)
BASOPHILS NFR BLD: 1 % (ref 0–2)
BILIRUB SERPL-MCNC: 0.4 MG/DL (ref 0.2–1)
BILIRUB UR QL: NEGATIVE
BUN SERPL-MCNC: 14 MG/DL (ref 7–18)
BUN/CREAT SERPL: 13 (ref 12–20)
CALCIUM SERPL-MCNC: 9.1 MG/DL (ref 8.5–10.1)
CHLORIDE SERPL-SCNC: 107 MMOL/L (ref 100–111)
CHOLEST SERPL-MCNC: 148 MG/DL
CO2 SERPL-SCNC: 27 MMOL/L (ref 21–32)
COLOR UR: YELLOW
CREAT SERPL-MCNC: 1.07 MG/DL (ref 0.6–1.3)
DIFFERENTIAL METHOD BLD: ABNORMAL
EOSINOPHIL # BLD: 0.1 K/UL (ref 0–0.4)
EOSINOPHIL NFR BLD: 3 % (ref 0–5)
EPITH CASTS URNS QL MICRO: ABNORMAL /LPF (ref 0–5)
ERYTHROCYTE [DISTWIDTH] IN BLOOD BY AUTOMATED COUNT: 12.7 % (ref 11.6–14.5)
EST. AVERAGE GLUCOSE BLD GHB EST-MCNC: 108 MG/DL
FOLATE SERPL-MCNC: 5.9 NG/ML (ref 3.1–17.5)
GLOBULIN SER CALC-MCNC: 3.4 G/DL (ref 2–4)
GLUCOSE SERPL-MCNC: 95 MG/DL (ref 74–99)
GLUCOSE UR STRIP.AUTO-MCNC: NEGATIVE MG/DL
HBA1C MFR BLD: 5.4 % (ref 4.2–5.6)
HCT VFR BLD AUTO: 38.8 % (ref 35–45)
HDLC SERPL-MCNC: 41 MG/DL (ref 40–60)
HDLC SERPL: 3.6 (ref 0–5)
HGB BLD-MCNC: 12.4 G/DL (ref 12–16)
HGB UR QL STRIP: NEGATIVE
IMM GRANULOCYTES # BLD AUTO: 0 K/UL (ref 0–0.04)
IMM GRANULOCYTES NFR BLD AUTO: 0 % (ref 0–0.5)
KETONES UR QL STRIP.AUTO: ABNORMAL MG/DL
LDLC SERPL CALC-MCNC: 93 MG/DL (ref 0–100)
LEUKOCYTE ESTERASE UR QL STRIP.AUTO: NEGATIVE
LIPID PANEL: NORMAL
LYMPHOCYTES # BLD: 1.4 K/UL (ref 0.9–3.6)
LYMPHOCYTES NFR BLD: 36 % (ref 21–52)
MCH RBC QN AUTO: 29 PG (ref 24–34)
MCHC RBC AUTO-ENTMCNC: 32 G/DL (ref 31–37)
MCV RBC AUTO: 90.9 FL (ref 78–100)
MONOCYTES # BLD: 0.4 K/UL (ref 0.05–1.2)
MONOCYTES NFR BLD: 10 % (ref 3–10)
NEUTS SEG # BLD: 2 K/UL (ref 1.8–8)
NEUTS SEG NFR BLD: 51 % (ref 40–73)
NITRITE UR QL STRIP.AUTO: NEGATIVE
NRBC # BLD: 0 K/UL (ref 0–0.01)
NRBC BLD-RTO: 0 PER 100 WBC
PH UR STRIP: 6.5 (ref 5–8)
PLATELET # BLD AUTO: 239 K/UL (ref 135–420)
PMV BLD AUTO: 9.8 FL (ref 9.2–11.8)
POTASSIUM SERPL-SCNC: 4.2 MMOL/L (ref 3.5–5.5)
PROT SERPL-MCNC: 7.1 G/DL (ref 6.4–8.2)
PROT UR STRIP-MCNC: ABNORMAL MG/DL
RBC # BLD AUTO: 4.27 M/UL (ref 4.2–5.3)
RBC #/AREA URNS HPF: ABNORMAL /HPF (ref 0–5)
SODIUM SERPL-SCNC: 141 MMOL/L (ref 136–145)
SP GR UR REFRACTOMETRY: >1.03 (ref 1–1.04)
T4 FREE SERPL-MCNC: 1 NG/DL (ref 0.7–1.5)
TRIGL SERPL-MCNC: 70 MG/DL
TSH SERPL DL<=0.05 MIU/L-ACNC: 0.48 UIU/ML (ref 0.36–3.74)
UROBILINOGEN UR QL STRIP.AUTO: 1 EU/DL (ref 0.2–1)
VIT B12 SERPL-MCNC: 314 PG/ML (ref 211–911)
VLDLC SERPL CALC-MCNC: 14 MG/DL
WBC # BLD AUTO: 3.9 K/UL (ref 4.6–13.2)
WBC URNS QL MICRO: ABNORMAL /HPF (ref 0–5)

## 2024-10-23 PROCEDURE — 84480 ASSAY TRIIODOTHYRONINE (T3): CPT

## 2024-10-23 PROCEDURE — 81001 URINALYSIS AUTO W/SCOPE: CPT

## 2024-10-23 PROCEDURE — 82306 VITAMIN D 25 HYDROXY: CPT

## 2024-10-23 PROCEDURE — 83036 HEMOGLOBIN GLYCOSYLATED A1C: CPT

## 2024-10-23 PROCEDURE — 36415 COLL VENOUS BLD VENIPUNCTURE: CPT

## 2024-10-23 PROCEDURE — 84439 ASSAY OF FREE THYROXINE: CPT

## 2024-10-23 PROCEDURE — 80061 LIPID PANEL: CPT

## 2024-10-23 PROCEDURE — 82746 ASSAY OF FOLIC ACID SERUM: CPT

## 2024-10-23 PROCEDURE — 85025 COMPLETE CBC W/AUTO DIFF WBC: CPT

## 2024-10-23 PROCEDURE — 84443 ASSAY THYROID STIM HORMONE: CPT

## 2024-10-23 PROCEDURE — 82607 VITAMIN B-12: CPT

## 2024-10-23 PROCEDURE — 80053 COMPREHEN METABOLIC PANEL: CPT

## 2024-10-23 SDOH — ECONOMIC STABILITY: FOOD INSECURITY: WITHIN THE PAST 12 MONTHS, YOU WORRIED THAT YOUR FOOD WOULD RUN OUT BEFORE YOU GOT MONEY TO BUY MORE.: NEVER TRUE

## 2024-10-23 SDOH — ECONOMIC STABILITY: FOOD INSECURITY: WITHIN THE PAST 12 MONTHS, THE FOOD YOU BOUGHT JUST DIDN'T LAST AND YOU DIDN'T HAVE MONEY TO GET MORE.: NEVER TRUE

## 2024-10-23 SDOH — ECONOMIC STABILITY: INCOME INSECURITY: HOW HARD IS IT FOR YOU TO PAY FOR THE VERY BASICS LIKE FOOD, HOUSING, MEDICAL CARE, AND HEATING?: NOT HARD AT ALL

## 2024-10-23 ASSESSMENT — PATIENT HEALTH QUESTIONNAIRE - PHQ9
SUM OF ALL RESPONSES TO PHQ QUESTIONS 1-9: 2
2. FEELING DOWN, DEPRESSED OR HOPELESS: MORE THAN HALF THE DAYS
SUM OF ALL RESPONSES TO PHQ QUESTIONS 1-9: 2
SUM OF ALL RESPONSES TO PHQ QUESTIONS 1-9: 2
SUM OF ALL RESPONSES TO PHQ9 QUESTIONS 1 & 2: 2
1. LITTLE INTEREST OR PLEASURE IN DOING THINGS: NOT AT ALL
SUM OF ALL RESPONSES TO PHQ QUESTIONS 1-9: 2

## 2024-10-23 NOTE — PROGRESS NOTES
\"Have you been to the ER, urgent care clinic since your last visit?  Hospitalized since your last visit?\"    NO    “Have you seen or consulted any other health care providers outside our system since your last visit?”    NO     “Have you had a pap smear?”    NO    Date of last Cervical Cancer screen (HPV or PAP): 10/23/2014         Click Here for Release of Records Request

## 2024-10-23 NOTE — PROGRESS NOTES
Ifrah Abreu (:  1990) is a 34 y.o. female, here for evaluation of the following medical concerns:  Chief Complaint   Patient presents with    Annual Exam    Discuss Labs     10/9/2024         ASSESSMENT/PLAN:    1. Annual physical exam  2. Cervical cancer screening  -     External Referral To Gynecology  3. Abnormal facial hair  -     External Referral To Gynecology     No follow-ups on file.        HPI  Annual physical today  Need labs today she did eat today.   MGM breast cancer    Vaccines-  Declines flu  Pcv20- history of cigarette  Hepatitis B    Eye- utd  Dental- needs attention has new insurance    Weight gain needs to work on lifestyle changes.     Facial hair concerns      Review of Systems   Constitutional:  Negative for diaphoresis and fatigue.   Respiratory:  Negative for cough, chest tightness and shortness of breath.    Cardiovascular:  Negative for chest pain, palpitations and leg swelling.   Gastrointestinal:  Negative for abdominal pain, nausea and vomiting.   Neurological:  Negative for dizziness, weakness and headaches.       Prior to Visit Medications    Medication Sig Taking? Authorizing Provider   famotidine (PEPCID) 20 MG tablet Take 1 tablet by mouth daily Yes Sun Lerner APRN - NP   acetaminophen (TYLENOL) 500 MG tablet Take 2 tablets by mouth every 4 hours as needed for Fever or Pain  Patient not taking: Reported on 10/9/2024  Provider, Historical, MD        Social History     Tobacco Use    Smoking status: Former     Current packs/day: 0.00     Types: Cigarettes     Quit date: 2024     Years since quittin.2    Smokeless tobacco: Never   Substance Use Topics    Alcohol use: Yes     Comment: seldom        BP 99/67 (Site: Left Upper Arm, Position: Sitting, Cuff Size: Large Adult)   Pulse 97   Temp 98 °F (36.7 °C) (Temporal)   Resp 18   Ht 1.676 m (5' 6\")   Wt 93 kg (205 lb)   LMP 10/06/2024   SpO2 95%   BMI 33.09 kg/m²   Estimated body mass index is

## 2024-10-23 NOTE — PATIENT INSTRUCTIONS
the same time as inactivated influenza vaccine. Ask your health care provider for more information.    People sometimes faint after medical procedures, including vaccination. Tell your provider if you feel dizzy or have vision changes or ringing in the ears.    As with any medicine, there is a very remote chance of a vaccine causing a severe allergic reaction, other serious injury, or death.    5. What if there is a serious problem?    An allergic reaction could occur after the vaccinated person leaves the clinic. If you see signs of a severe allergic reaction (hives, swelling of the face and throat, difficulty breathing, a fast heartbeat, dizziness, or weakness), call 9-1-1 and get the person to the nearest hospital.    For other signs that concern you, call your health care provider.    Adverse reactions should be reported to the Vaccine Adverse Event Reporting System (VAERS). Your health care provider will usually file this report, or you can do it yourself. Visit the VAERS website at www.vaers.[facility].gov or call 1-383.133.2651. VAERS is only for reporting reactions, and VAERS staff members do not give medical advice.    6. The National Vaccine Injury Compensation Program    The National Vaccine Injury Compensation Program (VICP) is a federal program that was created to compensate people who may have been injured by certain vaccines. Claims regarding alleged injury or death due to vaccination have a time limit for filing, which may be as short as two years. Visit the VICP website at www.hrsa.gov/vaccinecompensation or call 1-536.694.9336 to learn about the program and about filing a claim.     7. How can I learn more?    Ask your health care provider.   Call your local or state health department.  Visit the website of the Food and Drug Administration (FDA) for vaccine package inserts and additional information at www.fda.gov/vaccines-blood-biologics/vaccines.  Contact the Centers for Disease Control and Prevention

## 2024-10-25 LAB — T3 SERPL-MCNC: 119 NG/DL (ref 71–180)

## 2024-11-02 RX ORDER — ERGOCALCIFEROL 1.25 MG/1
50000 CAPSULE, LIQUID FILLED ORAL WEEKLY
Qty: 12 CAPSULE | Refills: 1 | Status: SHIPPED | OUTPATIENT
Start: 2024-11-02

## 2025-02-26 ENCOUNTER — HOSPITAL ENCOUNTER (EMERGENCY)
Facility: HOSPITAL | Age: 35
Discharge: HOME | End: 2025-02-26
Attending: EMERGENCY MEDICINE
Payer: MEDICAID

## 2025-02-26 ENCOUNTER — APPOINTMENT (OUTPATIENT)
Facility: HOSPITAL | Age: 35
End: 2025-02-26
Attending: EMERGENCY MEDICINE
Payer: MEDICAID

## 2025-02-26 VITALS
WEIGHT: 202 LBS | RESPIRATION RATE: 18 BRPM | TEMPERATURE: 97.9 F | DIASTOLIC BLOOD PRESSURE: 85 MMHG | HEIGHT: 66 IN | BODY MASS INDEX: 32.47 KG/M2 | OXYGEN SATURATION: 100 % | HEART RATE: 96 BPM | SYSTOLIC BLOOD PRESSURE: 133 MMHG

## 2025-02-26 DIAGNOSIS — M54.12 CERVICAL RADICULOPATHY: Primary | ICD-10-CM

## 2025-02-26 DIAGNOSIS — M25.512 LEFT SHOULDER PAIN, UNSPECIFIED CHRONICITY: ICD-10-CM

## 2025-02-26 PROCEDURE — 72040 X-RAY EXAM NECK SPINE 2-3 VW: CPT

## 2025-02-26 PROCEDURE — 73030 X-RAY EXAM OF SHOULDER: CPT

## 2025-02-26 PROCEDURE — 96372 THER/PROPH/DIAG INJ SC/IM: CPT

## 2025-02-26 PROCEDURE — 99284 EMERGENCY DEPT VISIT MOD MDM: CPT

## 2025-02-26 PROCEDURE — 6360000002 HC RX W HCPCS: Performed by: EMERGENCY MEDICINE

## 2025-02-26 RX ORDER — METHOCARBAMOL 750 MG/1
750 TABLET, FILM COATED ORAL EVERY 8 HOURS PRN
Qty: 20 TABLET | Refills: 0 | Status: SHIPPED | OUTPATIENT
Start: 2025-02-26

## 2025-02-26 RX ORDER — NAPROXEN 500 MG/1
500 TABLET ORAL 2 TIMES DAILY
Qty: 10 TABLET | Refills: 0 | Status: SHIPPED | OUTPATIENT
Start: 2025-02-26 | End: 2025-03-03

## 2025-02-26 RX ORDER — DEXAMETHASONE SODIUM PHOSPHATE 4 MG/ML
6 INJECTION, SOLUTION INTRA-ARTICULAR; INTRALESIONAL; INTRAMUSCULAR; INTRAVENOUS; SOFT TISSUE
Status: COMPLETED | OUTPATIENT
Start: 2025-02-26 | End: 2025-02-26

## 2025-02-26 RX ADMIN — DEXAMETHASONE SODIUM PHOSPHATE 6 MG: 4 INJECTION INTRA-ARTICULAR; INTRALESIONAL; INTRAMUSCULAR; INTRAVENOUS; SOFT TISSUE at 17:31

## 2025-02-26 ASSESSMENT — PAIN DESCRIPTION - LOCATION: LOCATION: ARM

## 2025-02-26 ASSESSMENT — PAIN DESCRIPTION - FREQUENCY: FREQUENCY: INTERMITTENT

## 2025-02-26 ASSESSMENT — LIFESTYLE VARIABLES
HOW MANY STANDARD DRINKS CONTAINING ALCOHOL DO YOU HAVE ON A TYPICAL DAY: PATIENT DOES NOT DRINK
HOW OFTEN DO YOU HAVE A DRINK CONTAINING ALCOHOL: NEVER

## 2025-02-26 ASSESSMENT — PAIN DESCRIPTION - ORIENTATION: ORIENTATION: LEFT

## 2025-02-26 ASSESSMENT — PAIN SCALES - GENERAL: PAINLEVEL_OUTOF10: 7

## 2025-02-26 ASSESSMENT — PAIN DESCRIPTION - DESCRIPTORS: DESCRIPTORS: ACHING

## 2025-02-26 ASSESSMENT — PAIN - FUNCTIONAL ASSESSMENT: PAIN_FUNCTIONAL_ASSESSMENT: 0-10

## 2025-02-26 NOTE — ED PROVIDER NOTES
[facility] EMERGENCY DEPT  EMERGENCY DEPARTMENT HISTORY AND PHYSICAL EXAM      Pt Name: Ifrah Abreu  MRN: [id number]  Birthdate 1990  Date of evaluation: 2/26/2025  Provider: Aman Rodriguez DO    CHIEF COMPLAINT       Chief Complaint   Patient presents with    Arm Pain         HISTORY OF PRESENT ILLNESS   (Location/Symptom, Timing/Onset, Context/Setting, Quality, Duration, Modifying Factors, Severity)  Note limiting factors.   Ifrah Abreu is a 35 y.o. female with past medical history of recurrent pilonidal cyst, upper respiratory infection who presents to the emergency department with chief complaint of intermittent neck pain that radiates down to the left arm and tingling into her fingers.  She reports the symptoms have been going on for about 2 months.  She also gets left shoulder discomfort with it.  The pain is worse with certain movements.  She reports that recently on her job she had been lifting heavy objects but no trauma.  No focal weakness, dizziness, shortness of breath, neck stiffness, fever, chest pain, abdominal pain, no bowel or bladder dysfunction and no other complaints.    The history is provided by the patient. No  was used.       Nursing Notes were reviewed.    REVIEW OF SYSTEMS    (2-9 systems for level 4, 10 or more for level 5)     Review of Systems    Except as noted above the remainder of the review of systems was reviewed and negative.       PAST MEDICAL HISTORY     Past Medical History:   Diagnosis Date    Abnormal finding of foot     growth on right foot, planning on surgical removal of growth    Miscarriage          SURGICAL HISTORY       Past Surgical History:   Procedure Laterality Date    CYST INCISION AND DRAINAGE      multiple pilonidal cysts/abscesses    DILATION AND CURETTAGE OF UTERUS  2013    PILONIDAL CYST EXCISION N/A 1/19/2024    PILONIDAL CYSTECTOMY performed by German Matias MD at [facility] MAIN OR         CURRENT MEDICATIONS

## 2025-02-26 NOTE — ED TRIAGE NOTES
Patient presents ambulatory with c/o left arm pain for past several months. Patient reports that the pain is intermittent and sometimes it affects her fingers and left shoulder.

## 2025-04-10 ENCOUNTER — TELEPHONE (OUTPATIENT)
Age: 35
End: 2025-04-10

## 2025-07-22 NOTE — PROGRESS NOTES
Have you been to the ER, urgent care clinic since your last visit?  Hospitalized since your last visit?   2/26/25    Have you seen or consulted any other health care providers outside our system since your last visit?   N/A     “Have you had a pap smear?”    Has appointment Monday. [facility].    Date of last Cervical Cancer screen (HPV or PAP): 10/23/2014

## 2025-07-23 ENCOUNTER — OFFICE VISIT (OUTPATIENT)
Facility: CLINIC | Age: 35
End: 2025-07-23
Payer: MEDICAID

## 2025-07-23 VITALS
BODY MASS INDEX: 33.59 KG/M2 | WEIGHT: 209 LBS | TEMPERATURE: 98.1 F | OXYGEN SATURATION: 98 % | HEIGHT: 66 IN | DIASTOLIC BLOOD PRESSURE: 68 MMHG | HEART RATE: 81 BPM | SYSTOLIC BLOOD PRESSURE: 101 MMHG

## 2025-07-23 DIAGNOSIS — M79.10 MYALGIA: Primary | ICD-10-CM

## 2025-07-23 PROCEDURE — 99214 OFFICE O/P EST MOD 30 MIN: CPT | Performed by: NURSE PRACTITIONER

## 2025-07-23 SDOH — ECONOMIC STABILITY: FOOD INSECURITY: WITHIN THE PAST 12 MONTHS, YOU WORRIED THAT YOUR FOOD WOULD RUN OUT BEFORE YOU GOT MONEY TO BUY MORE.: NEVER TRUE

## 2025-07-23 SDOH — ECONOMIC STABILITY: FOOD INSECURITY: WITHIN THE PAST 12 MONTHS, THE FOOD YOU BOUGHT JUST DIDN'T LAST AND YOU DIDN'T HAVE MONEY TO GET MORE.: NEVER TRUE

## 2025-07-23 ASSESSMENT — PATIENT HEALTH QUESTIONNAIRE - PHQ9
SUM OF ALL RESPONSES TO PHQ QUESTIONS 1-9: 1
1. LITTLE INTEREST OR PLEASURE IN DOING THINGS: NOT AT ALL
2. FEELING DOWN, DEPRESSED OR HOPELESS: SEVERAL DAYS
SUM OF ALL RESPONSES TO PHQ QUESTIONS 1-9: 1

## 2025-07-23 NOTE — PROGRESS NOTES
Ifrah Abreu (:  1990) is a 35 y.o. female, here for evaluation of the following medical concerns:  Chief Complaint   Patient presents with    Back Pain     Back pain - right side, feels like stinging.   Breat pain - Possible stress, when stretching cramping under left breast. Not bothering when breathing   Thyroid concerns.         ASSESSMENT/PLAN:    Assessment & Plan  1. Cervical radiculopathy.  - Symptoms suggest cervical radiculopathy, likely due to an inflammatory process.  - Physical exam indicates tightness in the rhomboids and trapezius muscles.  - Discussed the potential causes, including strain, arthritis, and stress.  - Advised to monitor condition, consider magnesium glycinate at night, and seek chiropractic care for potential rib subluxation.    2. Anxiety.  - Reports experiencing anxiety, particularly while driving.  - Symptoms include panic and difficulty managing stress.  - Discussed the importance of breathing exercises and potential benefits of therapy.  - Encouraged to continue practicing breathing exercises and consider therapy when feasible.    3. Back pain.  - Back pain appears to be muscular in nature, possibly due to rib subluxation.  - Physical exam reveals tightness in the rhomboids and trapezius muscles.  - Discussed the benefits of chiropractic care for realignment and ensuring ribs are properly aligned.  - Advised to seek chiropractic care and consider massage therapy if affordable.    4. Foot pain.  - Foot pain is consistent with plantar fasciitis.  - Physical exam indicates pain from heel to toes.  - Discussed the use of a tennis ball for rolling exercises to alleviate pain.  - Advised to use a tennis ball for rolling exercises from heel to toe for 20 minutes each foot and consider a referral to a podiatrist if symptoms persist.    5. Weight gain.  - Weight gain likely due to stress eating.  - Reports increased weight and dissatisfaction with current eating habits.  -

## 2025-08-11 ENCOUNTER — OFFICE VISIT (OUTPATIENT)
Age: 35
End: 2025-08-11
Payer: MEDICAID

## 2025-08-11 VITALS
TEMPERATURE: 97.1 F | BODY MASS INDEX: 33.75 KG/M2 | HEIGHT: 66 IN | RESPIRATION RATE: 16 BRPM | WEIGHT: 210 LBS | DIASTOLIC BLOOD PRESSURE: 60 MMHG | HEART RATE: 98 BPM | OXYGEN SATURATION: 100 % | SYSTOLIC BLOOD PRESSURE: 124 MMHG

## 2025-08-11 DIAGNOSIS — L76.82 INCISIONAL PAIN: Primary | ICD-10-CM

## 2025-08-11 PROCEDURE — 99213 OFFICE O/P EST LOW 20 MIN: CPT | Performed by: COLON & RECTAL SURGERY

## (undated) DEVICE — LUB SURG MEDC STRL 2OZ TUBE MC -- MEDICHOICE

## (undated) DEVICE — SUTURE VCRL SZ 3-0 L27IN ABSRB UD L26MM SH 1/2 CIR J416H

## (undated) DEVICE — JELLY LUBRICATING 2 OZ SCREW-CAP MTL TUBE STRL SURGLUB

## (undated) DEVICE — 3M™ MICROPORE™ TAPE, 1530-2: Brand: 3M™ MICROPORE™

## (undated) DEVICE — REM POLYHESIVE ADULT PATIENT RETURN ELECTRODE: Brand: VALLEYLAB

## (undated) DEVICE — SOLUTION SCRB 4OZ 10% PVP I POVIDONE IOD TOP PAINT EXIDINE

## (undated) DEVICE — SHEET, T, LAPAROTOMY, STERILE: Brand: MEDLINE

## (undated) DEVICE — THREE-QUARTER SHEET: Brand: CONVERTORS

## (undated) DEVICE — Device

## (undated) DEVICE — SUTURE PERMAHAND SZ 0 L30IN NONABSORBABLE BLK SILK BRAID A306H

## (undated) DEVICE — BANDAGE,GAUZE,BULKEE LITE,3"X4.1YD,STRL: Brand: MEDLINE

## (undated) DEVICE — SWAB CULT LIQ STUART AGR AERB MOD IN BRK SGL RAYON TIP PLAS 220099] BECTON DICKINSON MICRO]

## (undated) DEVICE — TAPE,CLOTH/SILK,CURAD,3"X10YD,LF,40/CS: Brand: CURAD

## (undated) DEVICE — KIT CLN UP BON SECOURS MARYV

## (undated) DEVICE — DRAIN SURG 10FR L1/8IN DIA3.2MM SIL CHN RND HUBLESS FULL

## (undated) DEVICE — ELECTRODE PT RET AD L9FT HI MOIST COND ADH HYDRGEL CORDED

## (undated) DEVICE — PACK PROCEDURE SURG MAJ W/ BASIN LF

## (undated) DEVICE — GAUZE,SPONGE,4"X4",16PLY,STRL,LF,10/TRAY: Brand: MEDLINE

## (undated) DEVICE — STERILE POLYISOPRENE POWDER-FREE SURGICAL GLOVES: Brand: PROTEXIS

## (undated) DEVICE — SOLUTION IRRIG 1000ML 0.9% SOD CHL USP POUR PLAS BTL

## (undated) DEVICE — INTENDED FOR TISSUE SEPARATION, AND OTHER PROCEDURES THAT REQUIRE A SHARP SURGICAL BLADE TO PUNCTURE OR CUT.: Brand: BARD-PARKER SAFETY BLADES SIZE 15, STERILE

## (undated) DEVICE — TRAY PREP DRY W/ PREM GLV 2 APPL 6 SPNG 2 UNDPD 1 OVERWRAP

## (undated) DEVICE — 3M™ MEDIPORE™ H SOFT CLOTH SURGICAL TAPE 2862, 2 INCH X 10 YARD (5CM X 9,1M), 12 ROLLS/CASE: Brand: 3M™ MEDIPORE™

## (undated) DEVICE — FLEX ADVANTAGE 3000CC: Brand: FLEX ADVANTAGE

## (undated) DEVICE — SOLUTION IV 1000ML 0.9% SOD CHL

## (undated) DEVICE — KIT OR TURNOVER

## (undated) DEVICE — CULTURETTE SGL EVAC TUBE PALL -- 100/CA

## (undated) DEVICE — INTENDED FOR TISSUE SEPARATION, AND OTHER PROCEDURES THAT REQUIRE A SHARP SURGICAL BLADE TO PUNCTURE OR CUT.: Brand: BARD-PARKER SAFETY BLADES SIZE 11, STERILE

## (undated) DEVICE — CATHETER IV 14GA L2IN POLYUR STR ORNG HUB SFTY RADPQ DISP

## (undated) DEVICE — SYRINGE MED 10ML TRNSLUC BRL PLUNG BLK MRK POLYPR CTRL

## (undated) DEVICE — PENROSE TUBING RADIOPAQUE: Brand: ARGYLE

## (undated) DEVICE — CANNULA PERF L2IN BLNT TIP 2MM VES CLR RADPQ BODY FEM LUER

## (undated) DEVICE — SYR 10ML CTRL LR LCK NSAF LF --